# Patient Record
Sex: MALE | Race: WHITE | URBAN - METROPOLITAN AREA
[De-identification: names, ages, dates, MRNs, and addresses within clinical notes are randomized per-mention and may not be internally consistent; named-entity substitution may affect disease eponyms.]

---

## 2017-01-19 ENCOUNTER — TRANSFERRED RECORDS (OUTPATIENT)
Dept: HEALTH INFORMATION MANAGEMENT | Facility: CLINIC | Age: 67
End: 2017-01-19

## 2017-01-19 ENCOUNTER — MEDICAL CORRESPONDENCE (OUTPATIENT)
Dept: HEALTH INFORMATION MANAGEMENT | Facility: CLINIC | Age: 67
End: 2017-01-19

## 2017-01-23 ENCOUNTER — PRE VISIT (OUTPATIENT)
Dept: UROLOGY | Facility: CLINIC | Age: 67
End: 2017-01-23

## 2017-01-23 NOTE — TELEPHONE ENCOUNTER
Per [facility], pt had CT scan done elsewhere. Left m with call back # with pt to find out where. rzo

## 2017-02-08 ASSESSMENT — ENCOUNTER SYMPTOMS
DISTURBANCES IN COORDINATION: 0
HEADACHES: 0
TINGLING: 0
DIZZINESS: 0
SPEECH CHANGE: 0
FLANK PAIN: 0
MEMORY LOSS: 0
PARALYSIS: 0
TREMORS: 0
DIFFICULTY URINATING: 0
NUMBNESS: 1
HEMATURIA: 0
SEIZURES: 0
LOSS OF CONSCIOUSNESS: 0
DYSURIA: 0

## 2017-02-13 ENCOUNTER — PRE VISIT (OUTPATIENT)
Dept: UROLOGY | Facility: CLINIC | Age: 67
End: 2017-02-13

## 2017-02-17 ENCOUNTER — OFFICE VISIT (OUTPATIENT)
Dept: UROLOGY | Facility: CLINIC | Age: 67
End: 2017-02-17

## 2017-02-17 ENCOUNTER — ALLIED HEALTH/NURSE VISIT (OUTPATIENT)
Dept: UROLOGY | Facility: CLINIC | Age: 67
End: 2017-02-17

## 2017-02-17 VITALS
WEIGHT: 230.8 LBS | DIASTOLIC BLOOD PRESSURE: 83 MMHG | BODY MASS INDEX: 34.18 KG/M2 | SYSTOLIC BLOOD PRESSURE: 147 MMHG | HEART RATE: 65 BPM | HEIGHT: 69 IN

## 2017-02-17 DIAGNOSIS — R35.0 URINARY FREQUENCY: Primary | ICD-10-CM

## 2017-02-17 DIAGNOSIS — N28.89 LEFT RENAL MASS: Primary | ICD-10-CM

## 2017-02-17 DIAGNOSIS — R35.0 URINARY FREQUENCY: ICD-10-CM

## 2017-02-17 LAB
ABO + RH BLD: NORMAL
ABO + RH BLD: NORMAL
ALBUMIN SERPL-MCNC: 3.8 G/DL (ref 3.4–5)
ALP SERPL-CCNC: 83 U/L (ref 40–150)
ALT SERPL W P-5'-P-CCNC: 35 U/L (ref 0–70)
ANION GAP SERPL CALCULATED.3IONS-SCNC: 10 MMOL/L (ref 3–14)
AST SERPL W P-5'-P-CCNC: 28 U/L (ref 0–45)
BASOPHILS # BLD AUTO: 0 10E9/L (ref 0–0.2)
BASOPHILS NFR BLD AUTO: 0.2 %
BILIRUB SERPL-MCNC: 0.4 MG/DL (ref 0.2–1.3)
BLD GP AB SCN SERPL QL: NORMAL
BLOOD BANK CMNT PATIENT-IMP: NORMAL
BUN SERPL-MCNC: 26 MG/DL (ref 7–30)
CALCIUM SERPL-MCNC: 8.5 MG/DL (ref 8.5–10.1)
CHLORIDE SERPL-SCNC: 94 MMOL/L (ref 94–109)
CO2 SERPL-SCNC: 32 MMOL/L (ref 20–32)
CREAT SERPL-MCNC: 1.06 MG/DL (ref 0.66–1.25)
DIFFERENTIAL METHOD BLD: NORMAL
EOSINOPHIL # BLD AUTO: 0 10E9/L (ref 0–0.7)
EOSINOPHIL NFR BLD AUTO: 0.7 %
ERYTHROCYTE [DISTWIDTH] IN BLOOD BY AUTOMATED COUNT: 12.8 % (ref 10–15)
GFR SERPL CREATININE-BSD FRML MDRD: 69 ML/MIN/1.7M2
GLUCOSE SERPL-MCNC: 170 MG/DL (ref 70–99)
HCT VFR BLD AUTO: 45.8 % (ref 40–53)
HGB BLD-MCNC: 15.6 G/DL (ref 13.3–17.7)
IMM GRANULOCYTES # BLD: 0 10E9/L (ref 0–0.4)
IMM GRANULOCYTES NFR BLD: 0.2 %
INR PPP: 0.97 (ref 0.86–1.14)
LYMPHOCYTES # BLD AUTO: 1.7 10E9/L (ref 0.8–5.3)
LYMPHOCYTES NFR BLD AUTO: 38.6 %
MCH RBC QN AUTO: 29 PG (ref 26.5–33)
MCHC RBC AUTO-ENTMCNC: 34.1 G/DL (ref 31.5–36.5)
MCV RBC AUTO: 85 FL (ref 78–100)
MONOCYTES # BLD AUTO: 0.6 10E9/L (ref 0–1.3)
MONOCYTES NFR BLD AUTO: 13.5 %
NEUTROPHILS # BLD AUTO: 2 10E9/L (ref 1.6–8.3)
NEUTROPHILS NFR BLD AUTO: 46.8 %
NRBC # BLD AUTO: 0 10*3/UL
NRBC BLD AUTO-RTO: 0 /100
PLATELET # BLD AUTO: 186 10E9/L (ref 150–450)
POTASSIUM SERPL-SCNC: 3.2 MMOL/L (ref 3.4–5.3)
PROT SERPL-MCNC: 8.2 G/DL (ref 6.8–8.8)
RBC # BLD AUTO: 5.38 10E12/L (ref 4.4–5.9)
SODIUM SERPL-SCNC: 137 MMOL/L (ref 133–144)
SPECIMEN EXP DATE BLD: NORMAL
WBC # BLD AUTO: 4.3 10E9/L (ref 4–11)

## 2017-02-17 RX ORDER — HEPARIN SODIUM 5000 [USP'U]/.5ML
5000 INJECTION, SOLUTION INTRAVENOUS; SUBCUTANEOUS
Status: CANCELLED | OUTPATIENT
Start: 2017-02-17

## 2017-02-17 RX ORDER — CEFAZOLIN SODIUM 1 G/3ML
1 INJECTION, POWDER, FOR SOLUTION INTRAMUSCULAR; INTRAVENOUS SEE ADMIN INSTRUCTIONS
Status: CANCELLED | OUTPATIENT
Start: 2017-02-17

## 2017-02-17 RX ORDER — ACETAMINOPHEN 325 MG/1
975 TABLET ORAL ONCE
Status: CANCELLED | OUTPATIENT
Start: 2017-02-17 | End: 2017-02-17

## 2017-02-17 ASSESSMENT — PAIN SCALES - GENERAL: PAINLEVEL: NO PAIN (0)

## 2017-02-17 NOTE — NURSING NOTE
Pre Op Teaching Flowsheet       Pre and Post op Patient Education  Relevant Diagnosis:  Renal mass  Teaching Topic:  Pre and post op teaching  Person Involved in teaching: Larry Wegener    Motivation Level:  Asks Questions: Yes  Eager to Learn:  Yes  Cooperative: Yes  Receptive (willing/able to accept information):  Yes  Patient demonstrates understanding of the following:  Date and time of surgery:  TBD  Location of surgery:  TBD  History and Physical and any other testing necessary prior to surgery: Yes, Patient is going to have pre-op done in [facility] Pate in [facility]  Required time line for completion of History and Physical and any pre-op testing: Yes    NPO Guidelines: Nothing to eat 8hrs prior, Nothing to drink 2hrs prior    Patient demonstrates understanding of the following:  Pre-op bowel prep:  N/A  Pre-op showering/scrub information with PCMX Soap: Yes  Medications to take the day of surgery:  Per PCP  Blood thinner medications discussed and when to stop (if applicable):  Yes  Diabetes medication management (if applicable):  N/A  Discussed pain control after surgery: pain medications  Infection Prevention: Patient demonstrates understanding of the following:  Patient instructed on hand hygiene:  Yes  Surgical procedure site care taught: Yes  Signs and symptoms of infection taught:  Yes  Wound care will be taught at the time of discharge.  Central venous catheter care will be taught at the time of discharge (if applicable).    Post-op follow-up:  Discussed how to contact the hospital, nurse, and clinic scheduling staff if necessary.    Instructional materials used/given/mailed:  [place] [facility] Booklet, post op teaching sheet, Map, Soap, and arrival/location information.    Surgical instructions packet given to patient in office:  No, explain. Packet will be mailed when surgery is scheduled.

## 2017-02-17 NOTE — PROGRESS NOTES
ASSESSMENT AND PLAN    4.5 cm Left renal mass.  This is complicated by Diabetes Mellitus.    During counseling for this visit, we covered the risk of renal cell carcinoma and how this renal mass may be a non-cancerous lesion.  We covered options including observation, cryoablation, partial nephrectomy, and nephrectomy.  We covered different surgical approaches such as percutaneous, laparoscopic, robotic, and open surgery.  We covered the risk of observation which is metastatic spread and need for continued observation.  We covered surgical risks which include but are not limited to heart attack, stroke, blood clot in the legs or lungs, death, injury to surrounding organs (intestine, liver, spleen, pancreas, lung, muscles, nerves), hernias, loss of sensation around incisions, decreased renal function, and infection.  We discussed how blood transfusion may be necessary and the risks are viral illnesses such as HIV(AIDS) and Hepatitis.  Additional procedures may be necessary in the perioperative period.  If minimally invasive techniques are used it may be necessary to convert to open surgery, and if partial nephrectomy is attempted than full nephrectomy may be required.    We focused mainly on robot vs open partial nephrectomy.  I think open would be a better option given mass location and his body habitus.    T1 High Grade bladder cancer.  -He will follow-up with Dr Mclaughlin for this.  _______________________________________________________________________    CHIEF COMPLAINT  It was my pleasure to see Larry Wegener who is a 66 year old male for evaluation of renal mass    HPI   Mr. Lane is a very pleasant 58-year-old gentleman who is referred by Dr. Saida Mclaughlin for a left renal mass.  He is here today along with his wife.        His history began a few weeks ago when he presented to Dr. Mclaughlin with gross hematuria.  She evaluated him with a CT scan and cystoscopy.  He was found to have a bladder tumor and then a 4.5 cm  left renal mass.  Dr. Mclaughlin has since resected his bladder tumor and found T1 high grade bladder cancer.  She is planning a re-resection.  Of note, there was muscle in the initial specimen.        She is referring him here for the left renal mass.  He denies any family history or previous history of this with the exception of a nephew with renal cell carcinoma.       Presenting symptom: Hematuria  Hereditary syndromes: None.  Nephew with renal cell carcinoma.  ECOG Performance Score: 0  0=Fully active, 1=Unable to do strenuous activity but capable of light work, 2=Ambulatory and capable of all selfcare, 3=Capable of limited selfcare, confined to bed >50% of waking hours, 4=Completely disabled.    12/14/16 TRANSURETHRAL RESECTION OF BLADDER TUMOR Saida Mclaughlin MD        RADIOLOGIC IMAGING  12/13/16 CT Urogram [place]  Technique: CT of the abdomen and pelvis performed with coronal   reconstructions before and after IV contrast administration in the   pre-enhancement, nephrographic phase of enhancement, and excretory   phase of enhancement using either single or split bolus techniques.   This CT Scan used dose modulation, iterative reconstruction, and/or   weight based dosing when appropriate to reduce radiation dose to as   low as reasonably achievable.     Comparison: Ultrasound dated 12/12/2016     Findings:     Kidneys, Ureters, and Bladder: There is a focal mass arising from the   left kidney. This is a solid mass with inhomogeneous enhancement.   This measures approximately 4.5 x 3.9 x 3.3 cm. This is suspicious   for a possible hypernephroma or renal cell carcinoma versus   oncocytoma, or other solid renal mass. There is no hydronephrosis.     In addition there is a mass in the right posterior aspect of the   bladder. This measures approximately 3.6 x 3.0 cm in maximum   transverse dimension. This is suspicious for possible urothelial   neoplasm versus possible but less likely blood products.     Additional  Abdominal Findings: There is a large hemangioma left lobe   of the liver. There are multiple small gallstones or gravel in the   gallbladder. The pancreas, spleen, and adrenal glands are   unremarkable. No adenopathy, fluid collections, or inflammatory   changes. There are atelectatic changes in the left lung base.      Additional Pelvic Findings: No pelvic adenopathy, fluid collections,   or inflammatory changes. Bowel loops are normal caliber. There are   degenerative changes of the spine.      Impression:   1. 4.5 cm left renal mass. This could represent primary renal   neoplasm.  2. Focal mass arising from the posterior right wall of the bladder.   This could represent a primary urothelial neoplasm.  3. Cholelithiasis.  4. Left lobe of the liver hemangioma.                2/14/17 CHEST XRAY [place]  Technique: *XR CHEST AP or PA  LATERAL     Comparison: 2/9/2016.     Findings: Mild cardiomegaly. Normal pulmonary vascularity. Lungs   clear without acute consolidative airspace opacity, pleural effusion,   or pneumothorax. Mild subsegmental atelectasis left lower lobe. Prior   midline sternotomy. Broken sternal wire inferiorly projected over the   right hepatic dome anteriorly. Prior healed right third/fourth   lateral rib fractures. No significant change.    I reviewed the recent radiologic imaging and reports described above.      PMH      Allergies  NKDA      Social History     Occupational History     Not on file.     Social History Main Topics     Smoking status: Never Smoker     Smokeless tobacco: Never Used     Alcohol use Not on file     Drug use: Not on file     Sexual activity: Not on file       REVIEW OF SYSTEMS  Answers for HPI/ROS submitted by the patient on 2/8/2017   General Symptoms: No  Skin Symptoms: No  HENT Symptoms: No  EYE SYMPTOMS: No  HEART SYMPTOMS: No  LUNG SYMPTOMS: No  INTESTINAL SYMPTOMS: No  URINARY SYMPTOMS: Yes  REPRODUCTIVE SYMPTOMS: No  SKELETAL SYMPTOMS: No  BLOOD SYMPTOMS:  "No  NERVOUS SYSTEM SYMPTOMS: Yes  MENTAL HEALTH SYMPTOMS: No  Trouble holding urine or incontinence: Yes  Pain or burning: No  Trouble starting or stopping: No  Increased frequency of urination: Yes  Blood in urine: No  Decreased frequency of urination: No  Frequent nighttime urination: Yes  Flank pain: No  Difficulty emptying bladder: No  Trouble with coordination: No  Dizziness or trouble with balance: No  Fainting or black-out spells: No  Memory loss: No  Headache: No  Seizures: No  Speech problems: No  Tingling: No  Tremor: No  Paralysis: No  Numbness: Yes      PHYSICAL EXAM  Vitals:    02/17/17 1202   BP: 147/83   Pulse: 65   Weight: 104.7 kg (230 lb 12.8 oz)   Height: 1.753 m (5' 9\")     Wt Readings from Last 3 Encounters:   02/17/17 104.7 kg (230 lb 12.8 oz)     Constitutional: Alert, no acute distress  Psychiatric: Normal mood and affect  Head: Normocephalic. No masses, lesions, tenderness or abnormalities  Neck: Neck supple. No adenopathy. Thyroid symmetric, normal size  ENT: Oropharynx clear.  Back: No spinal tenderness.  No costovertebral angle tenderness  Cardiovascular: RRR. No murmurs, clicks gallops or rub  Respiratory: Good diaphragmatic excursion. Lungs clear  Gastrointestinal: Abdomen soft, non-tender. No masses, organomegaly. No hernia  Skin: no suspicious lesions or rashes on abdomen  Extremities: No lower extremity edema.  No clubbing or cyanosis.  Neurologic:  Cranial nerves grossly intact.  Equal strength and sensation on bilateral extremities.   : Deferred     Serafin MARIE, reviewed these laboratory values.      CC  Patient Care Team:  Joe Mitchell MD as MD (Urology)  Chito Powell MD (Urology)  CHITO POWELL    Copy to patient  LARRY WEGENER  [address] [place] [id number]    "

## 2017-02-17 NOTE — LETTER
2/17/2017       RE: Larry Wegener  [address]   [facility] [address]     Dear Colleague,    Thank you for referring your patient, Larry Wegener, to the [facility] UROLOGY AND INST FOR PROSTATE AND UROLOGIC CANCERS at [facility]. Please see a copy of my visit note below.    ASSESSMENT AND PLAN    4.5 cm Left renal mass.  This is complicated by Diabetes Mellitus.    During counseling for this visit, we covered the risk of renal cell carcinoma and how this renal mass may be a non-cancerous lesion.  We covered options including observation, cryoablation, partial nephrectomy, and nephrectomy.  We covered different surgical approaches such as percutaneous, laparoscopic, robotic, and open surgery.  We covered the risk of observation which is metastatic spread and need for continued observation.  We covered surgical risks which include but are not limited to heart attack, stroke, blood clot in the legs or lungs, death, injury to surrounding organs (intestine, liver, spleen, pancreas, lung, muscles, nerves), hernias, loss of sensation around incisions, decreased renal function, and infection.  We discussed how blood transfusion may be necessary and the risks are viral illnesses such as HIV(AIDS) and Hepatitis.  Additional procedures may be necessary in the perioperative period.  If minimally invasive techniques are used it may be necessary to convert to open surgery, and if partial nephrectomy is attempted than full nephrectomy may be required.    We focused mainly on robot vs open partial nephrectomy.  I think open would be a better option given mass location and his body habitus.    T1 High Grade bladder cancer.  -He will follow-up with Dr Mclaughlin for this.  _______________________________________________________________________    CHIEF COMPLAINT  It was my pleasure to see Larry Wegener who is a 66 year old male for evaluation of renal mass    HPI   Mr. Lane is a very pleasant 58-year-old  gentleman who is referred by Dr. Saida Mclaughlin for a left renal mass.  He is here today along with his wife.        His history began a few weeks ago when he presented to Dr. Mclaughlin with gross hematuria.  She evaluated him with a CT scan and cystoscopy.  He was found to have a bladder tumor and then a 4.5 cm left renal mass.  Dr. Mclaughlin has since resected his bladder tumor and found T1 high grade bladder cancer.  She is planning a re-resection.  Of note, there was muscle in the initial specimen.        She is referring him here for the left renal mass.  He denies any family history or previous history of this with the exception of a nephew with renal cell carcinoma.       Presenting symptom: Hematuria  Hereditary syndromes: None.  Nephew with renal cell carcinoma.  ECOG Performance Score: 0  0=Fully active, 1=Unable to do strenuous activity but capable of light work, 2=Ambulatory and capable of all selfcare, 3=Capable of limited selfcare, confined to bed >50% of waking hours, 4=Completely disabled.    12/14/16 TRANSURETHRAL RESECTION OF BLADDER TUMOR Saida Mclaughlin MD        RADIOLOGIC IMAGING  12/13/16 CT Urogram [place]  Technique: CT of the abdomen and pelvis performed with coronal   reconstructions before and after IV contrast administration in the   pre-enhancement, nephrographic phase of enhancement, and excretory   phase of enhancement using either single or split bolus techniques.   This CT Scan used dose modulation, iterative reconstruction, and/or   weight based dosing when appropriate to reduce radiation dose to as   low as reasonably achievable.     Comparison: Ultrasound dated 12/12/2016     Findings:     Kidneys, Ureters, and Bladder: There is a focal mass arising from the   left kidney. This is a solid mass with inhomogeneous enhancement.   This measures approximately 4.5 x 3.9 x 3.3 cm. This is suspicious   for a possible hypernephroma or renal cell carcinoma versus   oncocytoma, or other solid renal  mass. There is no hydronephrosis.     In addition there is a mass in the right posterior aspect of the   bladder. This measures approximately 3.6 x 3.0 cm in maximum   transverse dimension. This is suspicious for possible urothelial   neoplasm versus possible but less likely blood products.     Additional Abdominal Findings: There is a large hemangioma left lobe   of the liver. There are multiple small gallstones or gravel in the   gallbladder. The pancreas, spleen, and adrenal glands are   unremarkable. No adenopathy, fluid collections, or inflammatory   changes. There are atelectatic changes in the left lung base.      Additional Pelvic Findings: No pelvic adenopathy, fluid collections,   or inflammatory changes. Bowel loops are normal caliber. There are   degenerative changes of the spine.      Impression:   1. 4.5 cm left renal mass. This could represent primary renal   neoplasm.  2. Focal mass arising from the posterior right wall of the bladder.   This could represent a primary urothelial neoplasm.  3. Cholelithiasis.  4. Left lobe of the liver hemangioma.                2/14/17 CHEST XRAY [place]  Technique: *XR CHEST AP or PA  LATERAL     Comparison: 2/9/2016.     Findings: Mild cardiomegaly. Normal pulmonary vascularity. Lungs   clear without acute consolidative airspace opacity, pleural effusion,   or pneumothorax. Mild subsegmental atelectasis left lower lobe. Prior   midline sternotomy. Broken sternal wire inferiorly projected over the   right hepatic dome anteriorly. Prior healed right third/fourth   lateral rib fractures. No significant change.    I reviewed the recent radiologic imaging and reports described above.      PMH      Allergies  NKDA      Social History     Occupational History     Not on file.     Social History Main Topics     Smoking status: Never Smoker     Smokeless tobacco: Never Used     Alcohol use Not on file     Drug use: Not on file     Sexual activity: Not on file  "      PHYSICAL EXAM  Vitals:    02/17/17 1202   BP: 147/83   Pulse: 65   Weight: 104.7 kg (230 lb 12.8 oz)   Height: 1.753 m (5' 9\")     Wt Readings from Last 3 Encounters:   02/17/17 104.7 kg (230 lb 12.8 oz)     Constitutional: Alert, no acute distress  Psychiatric: Normal mood and affect  Head: Normocephalic. No masses, lesions, tenderness or abnormalities  Neck: Neck supple. No adenopathy. Thyroid symmetric, normal size  ENT: Oropharynx clear.  Back: No spinal tenderness.  No costovertebral angle tenderness  Cardiovascular: RRR. No murmurs, clicks gallops or rub  Respiratory: Good diaphragmatic excursion. Lungs clear  Gastrointestinal: Abdomen soft, non-tender. No masses, organomegaly. No hernia  Skin: no suspicious lesions or rashes on abdomen  Extremities: No lower extremity edema.  No clubbing or cyanosis.  Neurologic:  Cranial nerves grossly intact.  Equal strength and sensation on bilateral extremities.   : Deferred     ISerafin, reviewed these laboratory values.    CC  Patient Care Team:  Joe Mtichell MD as MD (Urology)  Chito Powell MD (Urology)  CHITO POWELL    Copy to patient  LARRY WEGENER  [address]   [facility] [id number]      "

## 2017-02-17 NOTE — MR AVS SNAPSHOT
After Visit Summary   2/17/2017    Larry Wegener    MRN: [id number]           Patient Information     Date Of Birth          1950        Visit Information        Provider Department      2/17/2017 11:40 AM Jeo Mitchell MD [facility] Urology and [facility] for Prostate and Urologic Cancers        Today's Diagnoses     Left renal mass    -  1      Care Instructions    Schedule surgery with Dr. Mitchell.    It was a pleasure meeting with you today.  Thank you for allowing me and my team the privilege of caring for you today.  YOU are the reason we are here, and I truly hope we provided you with the excellent service you deserve.  Please let us know if there is anything else we can do for you so that we can be sure you are leaving completely satisfied with your care experience.      Lani Eddy [facility]        Follow-ups after your visit        Additional Services     PAC Visit Referral (For [facility] Only)       Obesity and long surgery                  Your next 10 appointments already scheduled     Mar 22, 2017  1:00 PM CDT   (Arrive by 12:45 PM)   PAC RN ASSESSMENT with  Pac Rn   [facility] Preoperative Assessment Red Level ([facility] Surgery Red Level)    [address] [place] [id number] [phone]            Mar 22, 2017  1:30 PM CDT   (Arrive by 1:15 PM)   PAC EVALUATION with  Pac Kim 7   [facility] Preoperative Assessment [place] ([facility] Surgery Red Level)    [address] [place] [id number] [phone]            Mar 22, 2017  2:30 PM CDT   (Arrive by 2:15 PM)   PAC Anesthesia Consult with  Pac Anesthesiologist   [facility] Preoperative Assessment Red Level ([facility] Surgery Red Level)    [address] [place] [id number]   [phone]            Apr 05, 2017   Procedure with Joe Mitchell MD   [facility], [place], Same Day Surgery (--)    [address] [facility] [address]   [phone]  "           Apr 21, 2017  1:00 PM CDT   (Arrive by 12:45 PM)   Post-Op with Joe Mitchell MD   [facility] Urology and [facility] for Prostate and Urologic Cancers ([facility])    [address] [place] [id number] [phone]              Who to contact     Please call your clinic at [phone] to:    Ask questions about your health    Make or cancel appointments    Discuss your medicines    Learn about your test results    Speak to your doctor   If you have compliments or concerns about an experience at your clinic, or if you wish to file a complaint, please contact [facility] Patient Relations at [phone] or email us at [email or web address]         Additional Information About Your Visit        [email or web address] Information     [employer] gives you secure access to your electronic health record. If you see a primary care provider, you can also send messages to your care team and make appointments. If you have questions, please call your primary care clinic.  If you do not have a primary care provider, please call [phone] and they will assist you.      [employer] is an electronic gateway that provides easy, online access to your medical records. With [employer], you can request a clinic appointment, read your test results, renew a prescription or communicate with your care team.     To access your existing account, please contact your [facility] or call [phone] for assistance.        Care EveryWhere ID     This is your Care EveryWhere ID. This could be used by other organizations to access your [place] medical records  [id number]        Your Vitals Were     Pulse Height BMI (Body Mass Index)             65 1.753 m (5' 9\") 34.08 kg/m2          Blood Pressure from Last 3 Encounters:   02/17/17 147/83    Weight from Last 3 Encounters:   02/17/17 104.7 kg (230 lb 12.8 oz)              We Performed the " Following     ABO/Rh type and screen     CBC with platelets differential     Comprehensive metabolic panel     INR     PAC Visit Referral (For [facility] Only)     Becky-Operative Worksheet  (Urology General)        Primary Care Provider    None Specified       No address on file        Thank you!     Thank you for choosing [facility] UROLOGY AND [facility] FOR PROSTATE AND UROLOGIC CANCERS  for your care. Our goal is always to provide you with excellent care. Hearing back from our patients is one way we can continue to improve our services. Please take a few minutes to complete the written survey that you may receive in the mail after your visit with us. Thank you!             Your Updated Medication List - Protect others around you: Learn how to safely use, store and throw away your medicines at www.disposemymeds.org.      Notice  As of 2/17/2017 11:59 PM    You have not been prescribed any medications.

## 2017-02-17 NOTE — MR AVS SNAPSHOT
After Visit Summary   2/17/2017    Larry Wegener    MRN: [id number]           Patient Information     Date Of Birth          1950        Visit Information        Provider Department      2/17/2017 12:30 PM Nurse, [facility]        Today's Diagnoses     Left renal mass    -  1       Follow-ups after your visit        Who to contact     Please call your clinic at [phone] to:    Ask questions about your health    Make or cancel appointments    Discuss your medicines    Learn about your test results    Speak to your doctor   If you have compliments or concerns about an experience at your clinic, or if you wish to file a complaint, please contact [facility] Patient Relations at [phone] or email us at [email or web address]         Additional Information About Your Visit        MyChart Information     [employer] gives you secure access to your electronic health record. If you see a primary care provider, you can also send messages to your care team and make appointments. If you have questions, please call your primary care clinic.  If you do not have a primary care provider, please call [phone] and they will assist you.      [employer] is an electronic gateway that provides easy, online access to your medical records. With [employer], you can request a clinic appointment, read your test results, renew a prescription or communicate with your care team.     To access your existing account, please contact your [facility] or call [phone] for assistance.        Care EveryWhere ID     This is your Care EveryWhere ID. This could be used by other organizations to access your [place] medical records  [id number]         Blood Pressure from Last 3 Encounters:   02/17/17 147/83    Weight from Last 3 Encounters:   02/17/17 104.7 kg (230 lb 12.8 oz)               Today, you had the following     No orders found for display       Primary Care Provider    None Specified       No primary provider on file.        Thank you!     Thank you for choosing [facility]  for your care. Our goal is always to provide you with excellent care. Hearing back from our patients is one way we can continue to improve our services. Please take a few minutes to complete the written survey that you may receive in the mail after your visit with us. Thank you!             Your Updated Medication List - Protect others around you: Learn how to safely use, store and throw away your medicines at www.disposemymeds.org.      Notice  As of 2/17/2017  1:21 PM    You have not been prescribed any medications.

## 2017-02-17 NOTE — PATIENT INSTRUCTIONS
Schedule surgery with Dr. Mitchell.    It was a pleasure meeting with you today.  Thank you for allowing me and my team the privilege of caring for you today.  YOU are the reason we are here, and I truly hope we provided you with the excellent service you deserve.  Please let us know if there is anything else we can do for you so that we can be sure you are leaving completely satisfied with your care experience.      DIPIKA Avilez

## 2017-02-18 LAB
BACTERIA SPEC CULT: NO GROWTH
Lab: NORMAL
MICRO REPORT STATUS: NORMAL
SPECIMEN SOURCE: NORMAL

## 2017-03-22 ENCOUNTER — OFFICE VISIT (OUTPATIENT)
Dept: SURGERY | Facility: CLINIC | Age: 67
End: 2017-03-22

## 2017-03-22 ENCOUNTER — ANESTHESIA EVENT (OUTPATIENT)
Dept: SURGERY | Facility: CLINIC | Age: 67
DRG: 657 | End: 2017-03-22
Payer: MEDICARE

## 2017-03-22 ENCOUNTER — ALLIED HEALTH/NURSE VISIT (OUTPATIENT)
Dept: SURGERY | Facility: CLINIC | Age: 67
End: 2017-03-22

## 2017-03-22 VITALS
OXYGEN SATURATION: 94 % | BODY MASS INDEX: 37.65 KG/M2 | HEART RATE: 69 BPM | RESPIRATION RATE: 18 BRPM | DIASTOLIC BLOOD PRESSURE: 73 MMHG | HEIGHT: 67 IN | WEIGHT: 239.9 LBS | SYSTOLIC BLOOD PRESSURE: 136 MMHG

## 2017-03-22 DIAGNOSIS — Z01.818 PREOP EXAMINATION: Primary | ICD-10-CM

## 2017-03-22 DIAGNOSIS — Z01.810 ENCOUNTER FOR PREPROCEDURAL CARDIOVASCULAR EXAMINATION: ICD-10-CM

## 2017-03-22 DIAGNOSIS — Z01.818 PREOP GENERAL PHYSICAL EXAM: Primary | ICD-10-CM

## 2017-03-22 DIAGNOSIS — Z01.818 PREOP GENERAL PHYSICAL EXAM: ICD-10-CM

## 2017-03-22 LAB
ALBUMIN UR-MCNC: NEGATIVE MG/DL
ANION GAP SERPL CALCULATED.3IONS-SCNC: 7 MMOL/L (ref 3–14)
APPEARANCE UR: CLEAR
BILIRUB UR QL STRIP: NEGATIVE
BUN SERPL-MCNC: 21 MG/DL (ref 7–30)
CALCIUM SERPL-MCNC: 8.3 MG/DL (ref 8.5–10.1)
CHLORIDE SERPL-SCNC: 100 MMOL/L (ref 94–109)
CO2 SERPL-SCNC: 32 MMOL/L (ref 20–32)
COLOR UR AUTO: YELLOW
CREAT SERPL-MCNC: 1.04 MG/DL (ref 0.66–1.25)
ERYTHROCYTE [DISTWIDTH] IN BLOOD BY AUTOMATED COUNT: 13.2 % (ref 10–15)
GFR SERPL CREATININE-BSD FRML MDRD: 71 ML/MIN/1.7M2
GLUCOSE SERPL-MCNC: 173 MG/DL (ref 70–99)
GLUCOSE UR STRIP-MCNC: NEGATIVE MG/DL
HCT VFR BLD AUTO: 39.4 % (ref 40–53)
HGB BLD-MCNC: 13.5 G/DL (ref 13.3–17.7)
HGB UR QL STRIP: NEGATIVE
INR PPP: 0.99 (ref 0.86–1.14)
KETONES UR STRIP-MCNC: NEGATIVE MG/DL
LEUKOCYTE ESTERASE UR QL STRIP: NEGATIVE
MCH RBC QN AUTO: 29.3 PG (ref 26.5–33)
MCHC RBC AUTO-ENTMCNC: 34.3 G/DL (ref 31.5–36.5)
MCV RBC AUTO: 86 FL (ref 78–100)
NITRATE UR QL: NEGATIVE
PH UR STRIP: 5 PH (ref 5–7)
PLATELET # BLD AUTO: 209 10E9/L (ref 150–450)
POTASSIUM SERPL-SCNC: 4.2 MMOL/L (ref 3.4–5.3)
RBC # BLD AUTO: 4.6 10E12/L (ref 4.4–5.9)
SODIUM SERPL-SCNC: 139 MMOL/L (ref 133–144)
SP GR UR STRIP: 1.02 (ref 1–1.03)
URN SPEC COLLECT METH UR: NORMAL
UROBILINOGEN UR STRIP-MCNC: 0 MG/DL (ref 0–2)
WBC # BLD AUTO: 7.9 10E9/L (ref 4–11)

## 2017-03-22 RX ORDER — OXYMETAZOLINE HYDROCHLORIDE 0.05 G/100ML
2 SPRAY NASAL 2 TIMES DAILY PRN
COMMUNITY
End: 2017-04-21

## 2017-03-22 RX ORDER — ATENOLOL 50 MG/1
50 TABLET ORAL EVERY MORNING
COMMUNITY

## 2017-03-22 RX ORDER — ISOSORBIDE MONONITRATE 60 MG/1
60 TABLET, EXTENDED RELEASE ORAL EVERY MORNING
COMMUNITY

## 2017-03-22 RX ORDER — CEPHALEXIN 500 MG/1
CAPSULE ORAL
COMMUNITY
Start: 2016-12-14 | End: 2017-03-22

## 2017-03-22 RX ORDER — ELECTROLYTES/DEXTROSE
1 SOLUTION, ORAL ORAL EVERY EVENING
COMMUNITY

## 2017-03-22 RX ORDER — SIMVASTATIN 40 MG
40 TABLET ORAL AT BEDTIME
COMMUNITY

## 2017-03-22 RX ORDER — CHLORTHALIDONE 25 MG/1
25 TABLET ORAL EVERY MORNING
COMMUNITY

## 2017-03-22 RX ORDER — HYDROCODONE BITARTRATE AND ACETAMINOPHEN 5; 325 MG/1; MG/1
TABLET ORAL
COMMUNITY
Start: 2016-12-14 | End: 2017-03-22

## 2017-03-22 RX ORDER — PLANT STANOL ESTER 450 MG
2.5 TABLET ORAL EVERY MORNING
COMMUNITY

## 2017-03-22 RX ORDER — ASPIRIN 325 MG
TABLET ORAL
COMMUNITY

## 2017-03-22 RX ORDER — LOSARTAN POTASSIUM 25 MG/1
25 TABLET ORAL EVERY MORNING
COMMUNITY

## 2017-03-22 RX ORDER — OXYBUTYNIN CHLORIDE 5 MG/1
TABLET ORAL
COMMUNITY
Start: 2016-12-15 | End: 2017-03-22

## 2017-03-22 RX ORDER — PHENAZOPYRIDINE HYDROCHLORIDE 200 MG/1
TABLET, FILM COATED ORAL
COMMUNITY
End: 2017-03-22

## 2017-03-22 RX ORDER — FAMOTIDINE 40 MG/1
40 TABLET, FILM COATED ORAL 2 TIMES DAILY
COMMUNITY

## 2017-03-22 NOTE — H&P
Pre-Operative H & P     CC:  Preoperative exam to assess for increased cardiopulmonary risk while undergoing surgery and anesthesia.    Date of Encounter: 3/22/2017  Primary Care Physician:  Unknown, Provider    HPI  Larry Wegener is a 67 year old male who presents for pre-operative H & P in preparation for Left Open Partial Nephrectomy Flank Incision Possible Nephrectomy with Dr. Mitchell on 4/5/2017 at [facility].     History is obtained from the patient.   Pt presented with gross hematuria. He was found to have a bladder tumor and then a 4.5 cm left renal mass. Dr. Mclaughlin has since resected his bladder tumor and found T1 high grade bladder cancer. She is planning a re-resection. Of note, there was muscle in the initial specimen.         Past Medical History  Past Medical History:   Diagnosis Date     CKD (chronic kidney disease)      DM II (diabetes mellitus, type II), controlled (H)      GERD (gastroesophageal reflux disease)      HTN (hypertension)      Left renal mass      Neuropathy (H)        Past Surgical History  Past Surgical History:   Procedure Laterality Date     ANGIOGRAM  1997    stent     CORONARY ARTERY BYPASS  2004     CYSTOSCOPY, BIOPSY BLADDER INSTILL OPTICAL AGENT       TONSILLECTOMY         Hx of Blood transfusions/reactions: yes, 2012, no reactions     Hx of abnormal bleeding or anti-platelet use: asa and fish oil, will hold    Menstrual history: No LMP for male patient.:     Steroid use in the last year: none    Personal or FH with difficulty with Anesthesia:  None        Prior to Admission Medications  Current Outpatient Prescriptions   Medication Sig Dispense Refill     aspirin 325 MG tablet March 22, 2017 - Patient has been holding this medication in preparation for surgery.  Not taken since December 2016.  Plans to resume after surgery in April, will continue to hold for now.  Previously was on 325 mg by mouth daily.       atenolol (TENORMIN)  50 MG tablet Take 50 mg by mouth every morning        chlorthalidone (HYGROTON) 25 MG tablet Take 25 mg by mouth every morning        famotidine (PEPCID) 40 MG tablet Take 40 mg by mouth 2 times daily        insulin aspart (NOVOLOG PEN) 100 UNIT/ML injection Inject Subcutaneous 4 times daily (with meals and nightly) Takes base of 12 units +SSI at breakfast   Takes base of 12 units +SSI at lunch  Takes base of 14 units +SSI at supper  Takes base of 12 units +SSI at bedtime       insulin glargine (LANTUS) 100 UNIT/ML injection Inject 45 Units Subcutaneous At Bedtime        isosorbide mononitrate (IMDUR) 60 MG 24 hr tablet Take 60 mg by mouth every morning        losartan (COZAAR) 25 MG tablet Take 25 mg by mouth every morning        metFORMIN (GLUCOPHAGE) 500 MG tablet Take 1,000 mg by mouth 2 times daily (with meals)        potassium gluconate 2.5 MEQ TABS tablet Take 2.5 mEq by mouth every morning        simvastatin (ZOCOR) 40 MG tablet Take 40 mg by mouth At Bedtime        Multiple Vitamins-Minerals (MULTIVITAMIN ADULT) TABS Take 1 tablet by mouth every evening        Omega-3 Fatty Acids (FISH OIL PO) March 22, 2017 - Patient has been holding this medication in preparation for surgery.  Not taken since December 2016.  Plans to resume after surgery in April, will continue to hold for now.  Previously was on fish oil 1000 mg by mouth twice daily.       NITROGLYCERIN SL Place 0.4 mg under the tongue every 5 minutes as needed for chest pain       oxymetazoline (AFRIN) 0.05 % spray Spray 2 sprays into both nostrils 2 times daily as needed for congestion       ACETAMINOPHEN PO Take 500-1,000 mg by mouth every 8 hours as needed for pain         Allergies  No Known Allergies    Social History  Social History     Social History     Marital status:      Spouse name: N/A     Number of children: N/A     Years of education: N/A     Occupational History     Not on file.     Social History Main Topics     Smoking status:  "Never Smoker     Smokeless tobacco: Never Used     Alcohol use No     Drug use: No     Sexual activity: Not on file     Other Topics Concern     Not on file     Social History Narrative       Family History  No family history on file.              Anesthesia Evaluation     . Pt has had prior anesthetic. Type: General and MAC           ROS/MED HX    ENT/Pulmonary:     (+)VICTOR M risk factors snores loudly, hypertension, obese, , . .    Neurologic:  - neg neurologic ROS     Cardiovascular:     (+) hypertension----. Taking blood thinners : Instructions Given to patient: asa, on hold. . . :. . Previous cardiac testing Echodate:7/10/2015results:Stress Testdate: results:ECG reviewed date:3/22/2017 results:SR, 1st degree AVB, RBBB date: results:          METS/Exercise Tolerance:  4 - Raking leaves, gardening   Hematologic:  - neg hematologic  ROS       Musculoskeletal:  - neg musculoskeletal ROS       GI/Hepatic:     (+) GERD Asymptomatic on medication,       Renal/Genitourinary:     (+) Other Renal/ Genitourinary, left renal mass, bladder cancer      Endo:     (+) type II DM Last HgA1c: 7.5 date: 8/2016 Using insulin - not using insulin pump Obesity, .      Psychiatric:  - neg psychiatric ROS       Infectious Disease:  - neg infectious disease ROS       Malignancy:   (+) Malignancy History of Other  Other CA renal, bladder Active status post         Other:    (+) No chance of pregnancy C-spine cleared: N/A, no H/O Chronic Pain,no other significant disability                    Physical Exam  Normal systems: cardiovascular, pulmonary and dental    Airway   Mallampati: II  TM distance: >3 FB  Neck ROM: full    Dental   Normal    Cardiovascular   Rhythm and rate: regular and normal      Pulmonary    breath sounds clear to auscultation             The complete review of systems is negative other than noted in the HPI or here.                         239 lbs 14.4 oz  5' 7\"   Body mass index is 37.57 kg/(m^2).       Physical " Exam  Constitutional: Awake, alert, cooperative, no apparent distress, and appears stated age.  Eyes: Pupils equal, round and reactive to light, extra ocular muscles intact, sclera clear, conjunctiva normal.  HENT: Normocephalic, oral pharynx with moist mucus membranes, good dentition. No goiter appreciated.   Respiratory: Clear to auscultation bilaterally, no crackles or wheezing.  Cardiovascular: Regular rate and rhythm, normal S1 and S2, and no murmur noted.  Carotids +2, no bruits. No edema. Palpable pulses to radial  DP and PT arteries.   GI: Normal bowel sounds, soft, non-distended, non-tender, no masses palpated, no hepatosplenomegaly.  Surgical scars: chest  Lymph/Hematologic: No cervical lymphadenopathy and no supraclavicular lymphadenopathy.  Genitourinary:  deferred  Skin: Warm and dry.  No rashes at anticipated surgical site.   Musculoskeletal: Full ROM of neck. There is no redness, warmth, or swelling of the joints. Gross motor strength is normal.    Neurologic: Awake, alert, oriented to name, place and time. Cranial nerves II-XII are grossly intact. Gait is normal.   Neuropsychiatric: Calm, cooperative. Normal affect.     Labs: (personally reviewed)  Lab Results   Component Value Date    WBC 7.9 03/22/2017     Lab Results   Component Value Date    RBC 4.60 03/22/2017     Lab Results   Component Value Date    HGB 13.5 03/22/2017     Lab Results   Component Value Date    HCT 39.4 03/22/2017     No components found for: MCT  Lab Results   Component Value Date    MCV 86 03/22/2017     Lab Results   Component Value Date    MCH 29.3 03/22/2017     Lab Results   Component Value Date    MCHC 34.3 03/22/2017     Lab Results   Component Value Date    RDW 13.2 03/22/2017     Lab Results   Component Value Date     03/22/2017       Last Basic Metabolic Panel:  Lab Results   Component Value Date     03/22/2017      Lab Results   Component Value Date    POTASSIUM 4.2 03/22/2017     Lab Results    Component Value Date    CHLORIDE 100 03/22/2017     Lab Results   Component Value Date    FORREST 8.3 03/22/2017     Lab Results   Component Value Date    CO2 32 03/22/2017     Lab Results   Component Value Date    BUN 21 03/22/2017     Lab Results   Component Value Date    CR 1.04 03/22/2017     Lab Results   Component Value Date     03/22/2017       INR     0.99     3/22/2017    UA RESULTS:  Recent Labs   Lab Test  03/22/17   1521   COLOR  Yellow   APPEARANCE  Clear   URINEGLC  Negative   URINEBILI  Negative   URINEKETONE  Negative   SG  1.016   UBLD  Negative   URINEPH  5.0   PROTEIN  Negative   NITRITE  Negative   LEUKEST  Negative           EKG: Personally reviewed but formal cardiology read pending: 3/22/2017 SR with 1st degree AVB and RBBB. Age undetermined inferior MI. Will obtain stress test.    Stress 4/4/2017    Interpretation Summary  Technically difficult study limited by baseline wall motion abnormalities and  difficult acoustic windows.  High risk exercise stress echocardiogram.  Inferior akinesis at rest and peak exercise with associated mid-anterolateral  hypokinesis at peak exercise.  Mild to moderately reduced LV systolic function at rest. The LVEF is 40-45%  with minimal increase to 45-50% with exercise.  Normal blood pressure response to exercise.  EKG demonstrates baseline RBBB and Q waves inferior and inferolaterally. No  EKG evidence of ischemia at peak exercise.  No subjective evidence of ischemia at rest and at peak exercise.  No significant valvular disease noted on routine screening color flow Doppler  and pulsed Doppler examination.  In aggregate, these findings are c/w extensive prior myocardial infarction in  the inferior and lateral myocardial wall with perinfarct inducible ischemia in  the lateral myocardial segment.      Echo 7/10/2015  RESULTS  LEFT ATRIUM:    Left atrial size is enlarged at 4.1.    MITRAL VALVE:    Mild MR.    LEFT VENTRICLE:    Left ventricular size is  normal. Ejection fraction is approximately 45-50% with inferior and inferolateral hypokinesis.     AORTIC VALVE:    No AS. No aortic insufficiency.     AORTA:    Normal.    RIGHT ATRIUM:    Normal size.    TRICUSPID VALVE:    Mild TR.    RIGHT VENTRICLE:    Normal size.     PULMONIC VALVE:    Mild pulmonic insufficiency.     PERICARDIUM:    No pericardial effusion.    ADDITIONAL COMMENTS:    The interatrial septum is intact.     CONCLUSION:  1. Normal LV size.  Ejection fraction is approximately 45-50%. Inferolateral and inferior hypokinesis. Diastolic function is stage I.    2. No AS. No aortic insufficiency.     3. Mild MR.    4. Mild pulmonic insufficiency.     5. Mild TR.     6. No pericardial effusion.     7. Left atrial enlargement.     8. No previous echocardiogram for comparison.         ASSESSMENT and PLAN  Larry Wegener is a 67 year old male scheduled to undergo Left Open Partial Nephrectomy Flank Incision Possible Nephrectomy. He has the following specific operative considerations:   - RCRI : High serious cardiac risks.  6.6% risk of major adverse cardiac event. This is due to ischemic heart disease and DM requiring insulin. Will get stress test.  - Anesthesia considerations:  Refer to PAC assessment in anesthesia records  - VTE risk: 3% due to age, gender and current cancer  - VICTOR M # of risks 6/8 = 75%  - Post-op delirium risk: high risk due to age  - Risk of PONV score = 2.  If > 2, anti-emetic intervention recommended.       Previous anesthesia without complications.  Cardiac: HTN. PCI 1997, 3 vessel CABG 2004. Echo 1/10/2015, results above. EKG today SR with 1st degree AVB and RBBB. Denies cardiac symtoms.  Pulmonary:  No current pulmonary symptoms. Risk of VICTOR M  GI: GERD, daily Pepcid.  Renal: presented with gross hematuria. He was found to have a bladder tumor and then a 4.5 cm left renal mass. Dr. Mclaughlin has since resected his bladder tumor and found T1 high grade bladder cancer. She is planning a  re-resection. Of note, there was muscle in the initial specimen.     Can walk 6-7 blocks without stopping. Feasible airway      Patient was discussed with Dr Gonzalez.    RAFAEL Mabry CNS  Preoperative Assessment Center  [facility]  Clinic and Surgery Center  Phone: [phone]  Fax: [phone]

## 2017-03-22 NOTE — PATIENT INSTRUCTIONS
Preparing for Your Surgery  Name:  Larry Wegener   MRN:  [id number]   :  1950   Today's Date:  3/22/2017     Arriving for surgery:  Surgery date:  17  Surgery time:  7:45 AM  Arrival time:  5:45 AM    Please come to:     [facility], [address]  [address] [place]  [address]    -   parking is available in front of the hospital from 5:15 am to 8:00 pm    -  Stop at the Information Desk in the lobby    -   Inform the information person that you are here for surgery. An escort to 3c will be provided. If you would not like an escort, please proceed to 3C on the 3rd floor. [phone]     What can I eat or drink?  -  You may have solid food or milk products until 8 hours prior to your surgery.  -  You may have clear liquids such as: water, gatorade, tea, black coffee, jello, broth, apple juice or 7up/Sprite until 2 hours prior to your surgery.    Which medicines can I take?        -  Please hold vitamins for 7 days before surgery.        -  Please take 36 units Lantus Insulin the evening before surgery.    -  Do NOT take these medications in the morning, the day of surgery:  CHLORTHALIDONE,  ASPART INSULIN,  LOSARTAN,  METFORMIN.    -  Please take these medications the day of surgery:  ALL OTHER REGULARLY SCHEDULED MORNING MEDICATIONS.    How do I prepare myself?  -  Take two showers: one the night before surgery; and one the morning of surgery.         Use Scrubcare or Hibiclens to wash from neck down.  You may use your own shampoo and conditioner. No other hair products.   -  Do NOT use lotion, powder, deodorant, or antiperspirant the day of your surgery.  -  Do NOT wear any makeup, fingernail polish or jewelry.  -  Begin using Incentive Spirometer 1 week prior to surgery.  Use 4 times per day, up to 5-10 breaths each time.  Bring Incentive Spirometer to hospital.  -  Do not bring your own medications to the hospital, except for inhalers and eye drops.  -   Bring your ID and insurance card.    Questions or Concerns:  If you have questions or concerns, please call the  Preoperative Assessment Center, Monday-Friday 7AM-7PM:  [phone]    AFTER YOUR SURGERY  Breathing exercises   Breathing exercises help you recover faster. Take deep breaths and let the air out slowly. This will:     Help you wake up after surgery.    Help prevent complications like pneumonia.  Preventing complications will help you go home sooner.   We may give you a breathing device (incentive spirometer) to encourage you to breathe deeply.     Using an Incentive Spirometer  Soon after your surgery, a nurse or therapist will teach you breathing exercises. These keep your lungs clear, strengthen your breathing muscles, and help prevent complications.  The exercises include doing a deep-breathing exercise using a device called an incentive spirometer.  To do these exercises, you will breathe in through your mouth and not your nose. The incentive spirometer only works correctly if you breathe in through your mouth.  Four steps to clear lungs     Deep breathing expands the lungs, aids circulation, and helps prevent pneumonia.   1. Exhale normally.    Relax and breathe out.  2. Place your lips tightly around the mouthpiece.    Make sure the device is upright and not tilted.  3. Inhale as much air as you can through the mouthpiece (don't breath through your nose).    Inhale slowly and deeply.    Hold your breath long enough to keep the balls or disk raised for at least 3 seconds.    If you re inhaling too quickly, your device may make a tone. If you hear this tone, inhale more slowly.  4. Repeat the exercise regularly.    Do this exercise every hour while you're awake, or as your health care provider instructs.    You will also be taught coughing exercises and be asked to do them regularly on your own.    7093-8659 [employer]. [address] [place] [address], [place] [address]. All rights reserved.  This information is not intended as a substitute for professional medical care. Always follow your healthcare professional's instructions.        Nausea and vomiting   You may feel sick to your stomach after surgery; if so, let your nurse know.    Pain control:  After surgery, you may have pain. Our goal is to help you manage your pain. Pain medicine will help you feel comfortable enough to do activities that will help you heal.  These activities may include breathing exercises, walking and physical therapy.   To help your health care team treat your pain we will ask: 1) If you have pain  2) where it is located 3) describe your pain in your words  Methods of pain control include medications given by mouth, vein or by nerve block for some surgeries.  We may give you a pain control pump that will:  1) Deliver the medicine through a tube placed in your vein  2) Control the amount of medicine you receive  3) Allow you to push a button to deliver a dose of pain medicine  Sequential Compression Device (SCD) or Pneumo Boots:  You may need to wear SCD S on your legs or feet. These are wraps connected to a machine that pumps in air and releases it. The repeated pumping helps prevent blood clots from forming.

## 2017-03-22 NOTE — H&P
"  Pre-Operative H & P     CC:  Preoperative exam to assess for increased cardiopulmonary risk while undergoing surgery and anesthesia.    Date of Encounter: 3/22/2017  Primary Care Physician:  Unknown, Provider    HPI  Larry Wegener is a 67 year old male who presents for pre-operative H & P in preparation for *** with  *** on { :[id number]} [id number]\"[facility]\",[facility]\"[facility]\"}. ***    {   History obtained from:[id number]::\"History is obtained from the patient\"}.     Past Medical History  Past Medical History:   Diagnosis Date     CKD (chronic kidney disease)      DM II (diabetes mellitus, type II), controlled (H)      GERD (gastroesophageal reflux disease)      HTN (hypertension)      Left renal mass      Neuropathy (H)        Past Surgical History  Past Surgical History:   Procedure Laterality Date     CORONARY ARTERY BYPASS         Hx of Blood transfusions/reactions: ***     Hx of abnormal bleeding or anti-platelet use: ***    Menstrual history: No LMP for male patient.: ***    Steroid use in the last year: ***    Personal or FH with difficulty with Anesthesia:  ***    Prior to Admission Medications  Current Outpatient Prescriptions   Medication Sig Dispense Refill     aspirin 325 MG tablet Take 325 mg by mouth daily        atenolol (TENORMIN) 50 MG tablet Take 50 mg by mouth every morning        chlorthalidone (HYGROTON) 25 MG tablet Take 50 mg by mouth every morning        famotidine (PEPCID) 40 MG tablet Take 40 mg by mouth 2 times daily        insulin aspart (NOVOLOG PEN) 100 UNIT/ML injection Inject Subcutaneous 4 times daily (with meals and nightly) Sliding scale       insulin glargine (LANTUS) 100 UNIT/ML injection Inject 45 Units Subcutaneous At Bedtime        isosorbide mononitrate (IMDUR) 60 MG 24 hr tablet Take 60 mg by mouth every morning        losartan " "(COZAAR) 25 MG tablet Take 25 mg by mouth every morning        metFORMIN (GLUCOPHAGE) 500 MG tablet Take 1,000 mg by mouth 2 times daily (with meals)        potassium gluconate 2.5 MEQ TABS tablet Take 2.5 mEq by mouth every morning        simvastatin (ZOCOR) 40 MG tablet Take 40 mg by mouth At Bedtime        Multiple Vitamins-Minerals (MULTIVITAMIN ADULT) TABS Take 1 tablet by mouth every morning         Allergies  No Known Allergies    Social History  Social History     Social History     Marital status:      Spouse name: N/A     Number of children: N/A     Years of education: N/A     Occupational History     Not on file.     Social History Main Topics     Smoking status: Never Smoker     Smokeless tobacco: Never Used     Alcohol use No     Drug use: No     Sexual activity: Not on file     Other Topics Concern     Not on file     Social History Narrative       Family History  No family history on file.    Review of Systems  Preprocedure Note          No anesthesia note exists          The complete review of systems is negative other than noted in the HPI or here.       BP: 136/73 Pulse: 69   Resp: 18 SpO2: 94 %         239 lbs 14.4 oz  5' 7\"   Body mass index is 37.57 kg/(m^2).       Physical Exam  Constitutional: Awake, alert, cooperative, no apparent distress, and appears stated age.  Eyes: Pupils equal, round and reactive to light, extra ocular muscles intact, sclera clear, conjunctiva normal.  HENT: Normocephalic, oral pharynx with moist mucus membranes, good dentition. No goiter appreciated.   Respiratory: Clear to auscultation bilaterally, no crackles or wheezing.  Cardiovascular: Regular rate and rhythm, normal S1 and S2, and no murmur noted.  Carotids +2, no bruits. No edema. Palpable pulses to radial  DP and PT arteries.   GI: Normal bowel sounds, soft, non-distended, non-tender, no masses palpated, no hepatosplenomegaly.  Surgical scars: ***  Lymph/Hematologic: No cervical lymphadenopathy and no " "supraclavicular lymphadenopathy.  Genitourinary:  ***  Skin: Warm and dry.  No rashes at anticipated surgical site.   Musculoskeletal: Full ROM of neck. There is no redness, warmth, or swelling of the joints. Gross motor strength is normal.    Neurologic: Awake, alert, oriented to name, place and time. Cranial nerves II-XII are grossly intact. Gait is normal.   Neuropsychiatric: Calm, cooperative. Normal affect.     Labs: (personally reviewed)  EKG: Personally reviewed but formal cardiology read pending: ***  Cardiac echo: ***  Stress test: ***  PFT's: ***    Outside records reviewed from: ***    ASSESSMENT and PLAN  Larry Wegener is a 67 year old male scheduled to undergo ***. {He/She:264140} has the following specific operative considerations:   - RCRI : {PREOP REVISED CARDIAC INDEX (RCI):191238::\"No serious cardiac risks\"}.  *** risk of major adverse cardiac event.   - Anesthesia considerations:  Refer to PAC assessment in anesthesia records  - VTE risk:  - VICTOR M # of risks ***/8 = ***  - Post-op delirium risk: ***  - If afib, [id number] score ***.  Risk category ***.    - Risk of PONV score = ***.  If > 2, anti-emetic intervention recommended.  - If on chronic opiates, morphine equivalent = *** (if > 60mg/24 hr--> needs pain team consult)    Patient was discussed with { PAC Providers:[id number]}.    Florentin Gonzalez MD  Preoperative Assessment Center  [facility]  [facility]  Phone: [phone]  Fax: [phone]  "

## 2017-03-22 NOTE — ANESTHESIA PREPROCEDURE EVALUATION
Anesthesia Evaluation     . Pt has had prior anesthetic. Type: General and MAC           ROS/MED HX    ENT/Pulmonary:     (+)VICTOR M risk factors snores loudly, hypertension, obese, , . .    Neurologic:  - neg neurologic ROS     Cardiovascular:     (+) hypertension----. Taking blood thinners : Instructions Given to patient: asa, on hold. . . :. . Previous cardiac testing Echodate:7/10/2015results:Stress Testdate: results:ECG reviewed date:3/22/2017 results:SR, 1st degree AVB, RBBB date: results:          METS/Exercise Tolerance:  4 - Raking leaves, gardening   Hematologic:  - neg hematologic  ROS       Musculoskeletal:  - neg musculoskeletal ROS       GI/Hepatic:     (+) GERD Asymptomatic on medication,       Renal/Genitourinary:     (+) Other Renal/ Genitourinary, left renal mass, bladder cancer      Endo:     (+) type II DM Last HgA1c: 7.5 date: 8/2016 Using insulin - not using insulin pump Obesity, .      Psychiatric:  - neg psychiatric ROS       Infectious Disease:  - neg infectious disease ROS       Malignancy:   (+) Malignancy History of Other  Other CA renal, bladder Active status post         Other:    (+) No chance of pregnancy C-spine cleared: N/A, no H/O Chronic Pain,no other significant disability                    Physical Exam  Normal systems: cardiovascular, pulmonary and dental    Airway   Mallampati: II  TM distance: >3 FB  Neck ROM: full    Dental     Cardiovascular   Rhythm and rate: regular and normal      Pulmonary    breath sounds clear to auscultation    Other findings:   Echo 7/10/2015  RESULTS  LEFT ATRIUM:    Left atrial size is enlarged at 4.1.    MITRAL VALVE:    Mild MR.    LEFT VENTRICLE:    Left ventricular size is normal. Ejection fraction is approximately 45-50% with inferior and inferolateral hypokinesis.     AORTIC VALVE:    No AS. No aortic insufficiency.     AORTA:    Normal.    RIGHT ATRIUM:    Normal size.    TRICUSPID VALVE:    Mild TR.    RIGHT VENTRICLE:    Normal size.      PULMONIC VALVE:    Mild pulmonic insufficiency.     PERICARDIUM:    No pericardial effusion.    ADDITIONAL COMMENTS:    The interatrial septum is intact.     CONCLUSION:  1. Normal LV size.  Ejection fraction is approximately 45-50%. Inferolateral and inferior hypokinesis. Diastolic function is stage I.    2. No AS. No aortic insufficiency.     3. Mild MR.    4. Mild pulmonic insufficiency.     5. Mild TR.     6. No pericardial effusion.     7. Left atrial enlargement.     8. No previous echocardiogram for comparison.       Lab Results      Component                Value               Date                      WBC                      7.9                 03/22/2017            Lab Results      Component                Value               Date                      RBC                      4.60                03/22/2017            Lab Results      Component                Value               Date                      HGB                      13.5                03/22/2017            Lab Results      Component                Value               Date                      HCT                      39.4                03/22/2017            No components found for: MCT  Lab Results      Component                Value               Date                      MCV                      86                  03/22/2017            Lab Results      Component                Value               Date                      MCH                      29.3                03/22/2017            Lab Results      Component                Value               Date                      MCHC                     34.3                03/22/2017            Lab Results      Component                Value               Date                      RDW                      13.2                03/22/2017            Lab Results      Component                Value               Date                      PLT                      209                 03/22/2017               Last Basic Metabolic Panel:  Lab Results      Component                Value               Date                      NA                       139                 03/22/2017             Lab Results      Component                Value               Date                      POTASSIUM                4.2                 03/22/2017            Lab Results      Component                Value               Date                      CHLORIDE                 100                 03/22/2017            Lab Results      Component                Value               Date                      FORREST                      8.3                 03/22/2017            Lab Results      Component                Value               Date                      CO2                      32                  03/22/2017            Lab Results      Component                Value               Date                      BUN                      21                  03/22/2017            Lab Results      Component                Value               Date                      CR                       1.04                03/22/2017            Lab Results      Component                Value               Date                      GLC                      173                 03/22/2017                INR   0.99   3/22/2017    UA RESULTS:  Lab Test        03/22/17                       1521          COLOR        Yellow        APPEARANCE   Clear         URINEGLC     Negative      URINEBILI    Negative      URINEKETONE  Negative      SG           1.016         UBLD         Negative      URINEPH      5.0           PROTEIN      Negative      NITRITE      Negative      LEUKEST      Negative          Stress test 4/4/2017  Interpretation Summary  Technically difficult study limited by baseline wall motion abnormalities and  difficult acoustic windows.  High risk exercise stress echocardiogram.  Inferior akinesis at rest and peak exercise with associated  mid-anterolateral  hypokinesis at peak exercise.  Mild to moderately reduced LV systolic function at rest. The LVEF is 40-45%  with minimal increase to 45-50% with exercise.  Normal blood pressure response to exercise.  EKG demonstrates baseline RBBB and Q waves inferior and inferolaterally. No  EKG evidence of ischemia at peak exercise.  No subjective evidence of ischemia at rest and at peak exercise.  No significant valvular disease noted on routine screening color flow Doppler  and pulsed Doppler examination.  In aggregate, these findings are c/w extensive prior myocardial infarction in  the inferior and lateral myocardial wall with perinfarct inducible ischemia in  the lateral myocardial segment.           PAC Discussion and Assessment    ASA Classification: 3  Case is suitable for: [employer]  Anesthetic techniques and relevant risks discussed: GA  Invasive monitoring and risk discussed: Yes  Types:   Possibility and Risk of blood transfusion discussed: Yes  NPO instructions given: No solids 6 hours before surgery, stop clear liquids 2 hours before surgery  Additional anesthetic preparation and risks discussed:   Needs early admission to pre-op area:   Other:     PAC Resident/NP Anesthesia Assessment:  Larry Wegener is a 68 yo male scheduled for Left Open Partial Nephrectomy Flank Incision Possible Nephrectomy on 4/5/2017 by Dr. Mitchell. PAC referral by Dr. Mitchell for assessment and optimization of anesthesia due to HTN, CABG and DM II. Previous anesthesia without complications.    Cardiac: HTN. PCI 1997, 3 vessel CABG 2004. Echo 1/10/2015, results above. EKG today SR with 1st degree AVB and RBBB. Denies cardiac symtoms. Will obtain stress test. Results above  Pulmonary:  No current pulmonary symptoms. Risk of VICTOR M  GI: GERD, daily Pepcid.  Renal: presented with gross hematuria. He was found to have a bladder tumor and then a 4.5 cm left renal mass. Dr. Mclaughlin has since resected his bladder tumor and found T1  high grade bladder cancer. She is planning a re-resection. Of note, there was muscle in the initial specimen.     Can walk 6-7 blocks without stopping. Feasible airway     Pt also evaluated by Dr. Gonzalez. See recommendations below. Type and screen drawn, CBC, BMP done today. UA done  I spent 20 minutes face to face with pt, assessing, examining, and educating      Reviewed and Signed by PAC Mid-Level Provider/Resident  Mid-Level Provider/Resident: Suzanna Rushing, APRN  Date: 3/22/2016  Time: 1500    Attending Anesthesiologist Anesthesia Assessment:  I have reviewed the history and physical examination with the LA.  I have personally examined the patient.  The patient has a history of coronary artery disease including coronary stent in 1995 and CABG in 2004.  He denies having angina but has limited MET activity - 3-4 at this time.  An echocardiogram is remarkable for decreased EF of 45% and today's EKG is remarkable for RBBB and inferior ischemic changes.  Given these findings the patient requires stress testing prior to intraabdominal surgery.  This is being arranged at this time.      Reviewed and Signed by PAC Anesthesiologist  Anesthesiologist: Florentin Gonzalez  Date: 03/22/2017  Time: 1454  Pass/Fail:   Disposition:     PAC Pharmacist Assessment:        Pharmacist:   Date:   Time:      Anesthesia Plan      History & Physical Review  History and physical reviewed and following examination; no interval change.    ASA Status:  4 .        Plan for ETT and General with Intravenous induction. Maintenance will be Balanced.    PONV prophylaxis:  Ondansetron (or other 5HT-3) and Other (See comment)  Additional equipment: Videolaryngoscope, Arterial Line and 2nd IV I am familiar with the patient having evaluated him in the PAC.  I am aware that his stress test came back positive and that the cardiologist reviewing it felt we should proceed with surgery rather than delay surgery for protracted length of time if FEDERICO  needed.    Will use FLoTrac in addition to A line placed pre induction.  Will draw preop bnp.    Florentin Gonzalez MD      Postoperative Care  Postoperative pain management:  IV analgesics.  Plan for postoperative opioid use.    Consents  Anesthetic plan, risks, benefits and alternatives discussed with:  Patient..                          .

## 2017-03-22 NOTE — PROGRESS NOTES
Preoperative Assessment Center medication history for March 22, 2017 is complete.  See Epic admission navigator for allergy information, pharmacy, prior to admission medications and immunization status.    Operating room staff will still need to confirm medications and last dose information on day of surgery.     Medication history interview sources:  patient, med list from OSH    Changes made to PTA medication list (reason)  Added: acetaminophen  Afrin - takes PRN but using basically qhs the past 2 weeks  Deleted: none  Changed: chlorthalidone dose changed to 25 mg.   addeds sig for novolog        Additional medication history information (including reliability of information, actions taken by pharmacist):None    Prior to Admission medications    Medication Sig Last Dose Taking? Auth Provider   atenolol (TENORMIN) 50 MG tablet Take 50 mg by mouth every morning  Taking Yes Reported, Patient   chlorthalidone (HYGROTON) 25 MG tablet Take 25 mg by mouth every morning  Taking Yes Reported, Patient   famotidine (PEPCID) 40 MG tablet Take 40 mg by mouth 2 times daily  Taking Yes Reported, Patient   insulin aspart (NOVOLOG PEN) 100 UNIT/ML injection Inject Subcutaneous 4 times daily (with meals and nightly) Takes base of 12 units +SSI at breakfast   Takes base of 12 units +SSI at lunch  Takes base of 14 units +SSI at supper  Takes base of 12 units +SSI at bedtime Taking Yes Reported, Patient   insulin glargine (LANTUS) 100 UNIT/ML injection Inject 45 Units Subcutaneous At Bedtime  Taking Yes Reported, Patient   isosorbide mononitrate (IMDUR) 60 MG 24 hr tablet Take 60 mg by mouth every morning  Taking Yes Reported, Patient   losartan (COZAAR) 25 MG tablet Take 25 mg by mouth every morning  Taking Yes Reported, Patient   metFORMIN (GLUCOPHAGE) 500 MG tablet Take 1,000 mg by mouth 2 times daily (with meals)  Taking Yes Reported, Patient   potassium gluconate 2.5 MEQ TABS tablet Take 2.5 mEq by mouth every morning  Taking Yes  Reported, Patient   simvastatin (ZOCOR) 40 MG tablet Take 40 mg by mouth At Bedtime  Taking Yes Reported, Patient   Multiple Vitamins-Minerals (MULTIVITAMIN ADULT) TABS Take 1 tablet by mouth every evening  Taking Yes Reported, Patient   Omega-3 Fatty Acids (FISH OIL PO) March 22, 2017 - Patient has been holding this medication in preparation for surgery.  Not taken since December 2016.  Plans to resume after surgery in April, will continue to hold for now.  Previously was on fish oil 1000 mg by mouth twice daily. Taking Yes Unknown, Entered By History   NITROGLYCERIN SL Place 0.4 mg under the tongue every 5 minutes as needed for chest pain Taking Yes Unknown, Entered By History   oxymetazoline (AFRIN) 0.05 % spray Spray 2 sprays into both nostrils 2 times daily as needed for congestion Taking Yes Unknown, Entered By History   ACETAMINOPHEN PO Take 500-1,000 mg by mouth every 8 hours as needed for pain Taking Yes Unknown, Entered By History   aspirin 325 MG tablet March 22, 2017 - Patient has been holding this medication in preparation for surgery.  Not taken since December 2016.  Plans to resume after surgery in April, will continue to hold for now.  Previously was on 325 mg by mouth daily. Not Taking  Reported, Patient          Medication history completed by: Ritesh Burgos [facility]

## 2017-03-22 NOTE — MR AVS SNAPSHOT
After Visit Summary   3/22/2017    Larry Wegener    MRN: [id number]           Patient Information     Date Of Birth          1950        Visit Information        Provider Department      3/22/2017 1:00 PM Pharmacist, Adama Norwood [facility] Pre[facility]        Today's Diagnoses     Preop examination    -  1       Follow-ups after your visit        Your next 10 appointments already scheduled     Apr 05, 2017   Procedure with Joe Mitchell MD   [facility], [place], Same Day Surgery (--)    [address]  [facility] [id number] [phone]            Apr 21, 2017  1:00 PM CDT   (Arrive by 12:45 PM)   Post-Op with Joe Mitchell MD   [facility]rologic C[facility] ([facility])    [address]  [address]th [facility] [address] [phone]              Future tests that were ordered for you today     Open Future Orders        Priority Expected Expires Ordered    Exercise Stress Echocardiogram Routine  4/21/2017 3/22/2017            Who to contact     Please call your clinic at [phone] to:    Ask questions about your health    Make or cancel appointments    Discuss your medicines    Learn about your test results    Speak to your doctor   If you have compliments or concerns about an experience at your clinic, or if you wish to file a complaint, please contact [facility] Patient Relations at [phone] or email us at [email or web address]         Additional Information About Your Visit        MyChart Information     [email or web address] gives you secure access to your electronic health record. If you see a primary care provider, you can also send messages to your care team and make appointments. If you have questions, please call your primary care clinic.  If you do not have a primary care provider, please call [phone] and they will assist you.      [employer] is an electronic gateway  that provides easy, online access to your medical records. With [employer], you can request a clinic appointment, read your test results, renew a prescription or communicate with your care team.     To access your existing account, please contact your [facility] or call [phone] for assistance.        Care EveryWhere ID     This is your Care EveryWhere ID. This could be used by other organizations to access your [place] medical records  [id number]         Blood Pressure from Last 3 Encounters:   03/22/17 136/73   02/17/17 147/83    Weight from Last 3 Encounters:   03/22/17 108.8 kg (239 lb 14.4 oz)   02/17/17 104.7 kg (230 lb 12.8 oz)              Today, you had the following     No orders found for display       Primary Care Provider    Provider Unknown       No address on file        Thank you!     Thank you for choosing [facility]  for your care. Our goal is always to provide you with excellent care. Hearing back from our patients is one way we can continue to improve our services. Please take a few minutes to complete the written survey that you may receive in the mail after your visit with us. Thank you!             Your Updated Medication List - Protect others around you: Learn how to safely use, store and throw away your medicines at www.disposemymeds.org.          This list is accurate as of: 3/22/17  3:33 PM.  Always use your most recent med list.                   Brand Name Dispense Instructions for use    ACETAMINOPHEN PO      Take 500-1,000 mg by mouth every 8 hours as needed for pain       aspirin 325 MG tablet      March 22, 2017 - Patient has been holding this medication in preparation for surgery.  Not taken since December 2016.  Plans to resume after surgery in April, will continue to hold for now.  Previously was on 325 mg by mouth daily.       atenolol 50 MG tablet    TENORMIN     Take 50 mg by mouth every morning        chlorthalidone 25 MG tablet    HYGROTON     Take 25 mg by mouth every morning       famotidine 40 MG tablet    PEPCID     Take 40 mg by mouth 2 times daily       FISH OIL PO      March 22, 2017 - Patient has been holding this medication in preparation for surgery.  Not taken since December 2016.  Plans to resume after surgery in April, will continue to hold for now.  Previously was on fish oil 1000 mg by mouth twice daily.       insulin aspart 100 UNIT/ML injection    NovoLOG PEN     Inject Subcutaneous 4 times daily (with meals and nightly) Takes base of 12 units +SSI at breakfast  Takes base of 12 units +SSI at lunch Takes base of 14 units +SSI at supper Takes base of 12 units +SSI at bedtime       insulin glargine 100 UNIT/ML injection    LANTUS     Inject 45 Units Subcutaneous At Bedtime       isosorbide mononitrate 60 MG 24 hr tablet    IMDUR     Take 60 mg by mouth every morning       losartan 25 MG tablet    COZAAR     Take 25 mg by mouth every morning       metFORMIN 500 MG tablet    GLUCOPHAGE     Take 1,000 mg by mouth 2 times daily (with meals)       MULTIVITAMIN ADULT Tabs      Take 1 tablet by mouth every evening       NITROGLYCERIN SL      Place 0.4 mg under the tongue every 5 minutes as needed for chest pain       oxymetazoline 0.05 % spray    AFRIN     Spray 2 sprays into both nostrils 2 times daily as needed for congestion       potassium gluconate 2.5 MEQ Tabs tablet      Take 2.5 mEq by mouth every morning       simvastatin 40 MG tablet    ZOCOR     Take 40 mg by mouth At Bedtime

## 2017-03-22 NOTE — MR AVS SNAPSHOT
After Visit Summary   3/22/2017    Larry Wegener    MRN: [id number]           Patient Information     Date Of Birth          1950        Visit Information        Provider Department      3/22/2017 2:30 PM Rn, [facility] Preoperative Assessment [facility]        Care Instructions    Preparing for Your Surgery  Name:  Larry Wegener   MRN:  [id number]   :  1950   Today's Date:  3/22/2017     Arriving for surgery:  Surgery date:  17  Surgery time:  7:45 AM  Arrival time:  5:45 AM    Please come to:     [facility]  [address] [place] [address]    -   parking is available in front of the hospital from 5:15 am to 8:00 pm    -  Stop at the Information Desk in the lobby    -   Inform the information person that you are here for surgery. An escort to 3c will be provided. If you would not like an escort, please proceed to 3C on the 3rd floor. [phone]     What can I eat or drink?  -  You may have solid food or milk products until 8 hours prior to your surgery.  -  You may have clear liquids such as: water, gatorade, tea, black coffee, jello, broth, apple juice or 7up/Sprite until 2 hours prior to your surgery.    Which medicines can I take?        -  Please hold vitamins for 7 days before surgery.        -  Please take 36 units Lantus Insulin the evening before surgery.    -  Do NOT take these medications in the morning, the day of surgery:  CHLORTHALIDONE,  ASPART INSULIN,  LOSARTAN,  METFORMIN.    -  Please take these medications the day of surgery:  ALL OTHER REGULARLY SCHEDULED MORNING MEDICATIONS.    How do I prepare myself?  -  Take two showers: one the night before surgery; and one the morning of surgery.         Use Scrubcare or Hibiclens to wash from neck down.  You may use your own shampoo and conditioner. No other hair products.   -  Do NOT use lotion, powder, deodorant, or antiperspirant the day of your surgery.  -  Do  NOT wear any makeup, fingernail polish or jewelry.  -  Begin using Incentive Spirometer 1 week prior to surgery.  Use 4 times per day, up to 5-10 breaths each time.  Bring Incentive Spirometer to hospital.  -  Do not bring your own medications to the hospital, except for inhalers and eye drops.  -  Bring your ID and insurance card.    Questions or Concerns:  If you have questions or concerns, please call the  Preoperative Assessment Center, Monday-Friday 7AM-7PM:  [phone]    AFTER YOUR SURGERY  Breathing exercises   Breathing exercises help you recover faster. Take deep breaths and let the air out slowly. This will:     Help you wake up after surgery.    Help prevent complications like pneumonia.  Preventing complications will help you go home sooner.   We may give you a breathing device (incentive spirometer) to encourage you to breathe deeply.     Using an Incentive Spirometer  Soon after your surgery, a nurse or therapist will teach you breathing exercises. These keep your lungs clear, strengthen your breathing muscles, and help prevent complications.  The exercises include doing a deep-breathing exercise using a device called an incentive spirometer.  To do these exercises, you will breathe in through your mouth and not your nose. The incentive spirometer only works correctly if you breathe in through your mouth.  Four steps to clear lungs     Deep breathing expands the lungs, aids circulation, and helps prevent pneumonia.   1. Exhale normally.    Relax and breathe out.  2. Place your lips tightly around the mouthpiece.    Make sure the device is upright and not tilted.  3. Inhale as much air as you can through the mouthpiece (don't breath through your nose).    Inhale slowly and deeply.    Hold your breath long enough to keep the balls or disk raised for at least 3 seconds.    If you re inhaling too quickly, your device may make a tone. If you hear this tone, inhale more slowly.  4. Repeat the exercise  regularly.    Do this exercise every hour while you're awake, or as your health care provider instructs.    You will also be taught coughing exercises and be asked to do them regularly on your own.    2438-7713 [employer]. [address] [place] [address], [place] [address]. All rights reserved. This information is not intended as a substitute for professional medical care. Always follow your healthcare professional's instructions.        Nausea and vomiting   You may feel sick to your stomach after surgery; if so, let your nurse know.    Pain control:  After surgery, you may have pain. Our goal is to help you manage your pain. Pain medicine will help you feel comfortable enough to do activities that will help you heal.  These activities may include breathing exercises, walking and physical therapy.   To help your health care team treat your pain we will ask: 1) If you have pain  2) where it is located 3) describe your pain in your words  Methods of pain control include medications given by mouth, vein or by nerve block for some surgeries.  We may give you a pain control pump that will:  1) Deliver the medicine through a tube placed in your vein  2) Control the amount of medicine you receive  3) Allow you to push a button to deliver a dose of pain medicine  Sequential Compression Device (SCD) or Pneumo Boots:  You may need to wear SCD S on your legs or feet. These are wraps connected to a machine that pumps in air and releases it. The repeated pumping helps prevent blood clots from forming.         Follow-ups after your visit        Your next 10 appointments already scheduled     Mar 22, 2017  2:30 PM CDT   (Arrive by 2:15 PM)   PAC RN ASSESSMENT with Adama Pac Rn   [facility] ([facility])    [address] [place] [id number] [phone]            Mar 22, 2017  3:00 PM CDT   (Arrive by 2:45 PM)   PAC Anesthesia Consult with Uc Pac  Anesthesiologist   [facility] Preoperative Assessment Center ([facility])    [address]  4th Floor  [facility] [id number] [phone]            Apr 05, 2017   Procedure with Joe Mitchell MD   [facility], [place], Same Day Surgery (--)    [address]  [place] [address]   [phone]            Apr 21, 2017  1:00 PM CDT   (Arrive by 12:45 PM)   Post-Op with Joe Mitchell MD   [facility] and Inst for Prostate and Urologic Cancers ([facility])    [address]  4th [facility] [id number] [phone]              Future tests that were ordered for you today     Open Future Orders        Priority Expected Expires Ordered    ABO/Rh type and screen Routine 3/22/2017 4/21/2017 3/22/2017    Basic metabolic panel Routine 3/22/2017 4/21/2017 3/22/2017    CBC with platelets Routine 3/22/2017 4/21/2017 3/22/2017    INR Routine 3/22/2017 4/21/2017 3/22/2017    UA reflex to Microscopic and Culture Routine 3/22/2017 4/21/2017 3/22/2017            Who to contact     Please call your clinic at [phone] to:    Ask questions about your health    Make or cancel appointments    Discuss your medicines    Learn about your test results    Speak to your doctor   If you have compliments or concerns about an experience at your clinic, or if you wish to file a complaint, please contact [facility] Patient Relations at [phone] or email us at [email or web address]         Additional Information About Your Visit        MyChart Information     Ch[employer]WIFIhart gives you secure access to your electronic health record. If you see a primary care provider, you can also send messages to your care team and make appointments. If you have questions, please call your primary care clinic.  If you do not have a primary care provider, please call [phone] and they will assist you.      [employer] is an electronic  gateway that provides easy, online access to your medical records. With [employer], you can request a clinic appointment, read your test results, renew a prescription or communicate with your care team.     To access your existing account, please contact your [facility] or call [phone] for assistance.        Care EveryWhere ID     This is your Care EveryWhere ID. This could be used by other organizations to access your [place] medical records  [id number]         Blood Pressure from Last 3 Encounters:   03/22/17 136/73   02/17/17 147/83    Weight from Last 3 Encounters:   03/22/17 108.8 kg (239 lb 14.4 oz)   02/17/17 104.7 kg (230 lb 12.8 oz)              Today, you had the following     No orders found for display       Primary Care Provider    Provider Unknown       No address on file        Thank you!     Thank you for choosing [facility] PREOPERATIVE ASSESSMENT CENTER  for your care. Our goal is always to provide you with excellent care. Hearing back from our patients is one way we can continue to improve our services. Please take a few minutes to complete the written survey that you may receive in the mail after your visit with us. Thank you!             Your Updated Medication List - Protect others around you: Learn how to safely use, store and throw away your medicines at www.disposemymeds.org.          This list is accurate as of: 3/22/17  2:22 PM.  Always use your most recent med list.                   Brand Name Dispense Instructions for use    aspirin 325 MG tablet      Take 325 mg by mouth daily       atenolol 50 MG tablet    TENORMIN     Take 50 mg by mouth every morning       chlorthalidone 25 MG tablet    HYGROTON     Take 50 mg by mouth every morning       famotidine 40 MG tablet    PEPCID     Take 40 mg by mouth 2 times daily       insulin aspart 100 UNIT/ML injection    NovoLOG PEN     Inject Subcutaneous 4 times daily (with meals and nightly) Sliding scale        insulin glargine 100 UNIT/ML injection    LANTUS     Inject 45 Units Subcutaneous At Bedtime       isosorbide mononitrate 60 MG 24 hr tablet    IMDUR     Take 60 mg by mouth every morning       losartan 25 MG tablet    COZAAR     Take 25 mg by mouth every morning       metFORMIN 500 MG tablet    GLUCOPHAGE     Take 1,000 mg by mouth 2 times daily (with meals)       MULTIVITAMIN ADULT Tabs      Take 1 tablet by mouth every morning       potassium gluconate 2.5 MEQ Tabs tablet      Take 2.5 mEq by mouth every morning       simvastatin 40 MG tablet    ZOCOR     Take 40 mg by mouth At Bedtime

## 2017-03-23 LAB — INTERPRETATION ECG - MUSE: NORMAL

## 2017-04-04 ENCOUNTER — HOSPITAL ENCOUNTER (OUTPATIENT)
Dept: CARDIOLOGY | Facility: CLINIC | Age: 67
Discharge: HOME | DRG: 657 | End: 2017-04-04
Attending: NURSE PRACTITIONER | Admitting: NURSE PRACTITIONER
Payer: MEDICARE

## 2017-04-04 DIAGNOSIS — Z01.810 ENCOUNTER FOR PREPROCEDURAL CARDIOVASCULAR EXAMINATION: ICD-10-CM

## 2017-04-04 DIAGNOSIS — Z01.818 PREOP GENERAL PHYSICAL EXAM: ICD-10-CM

## 2017-04-04 PROCEDURE — 93018 CV STRESS TEST I&R ONLY: CPT | Performed by: INTERNAL MEDICINE

## 2017-04-04 PROCEDURE — 93350 STRESS TTE ONLY: CPT | Mod: 26 | Performed by: INTERNAL MEDICINE

## 2017-04-04 PROCEDURE — 93321 DOPPLER ECHO F-UP/LMTD STD: CPT | Mod: 26 | Performed by: INTERNAL MEDICINE

## 2017-04-04 PROCEDURE — 93016 CV STRESS TEST SUPVJ ONLY: CPT | Performed by: INTERNAL MEDICINE

## 2017-04-04 PROCEDURE — 93325 DOPPLER ECHO COLOR FLOW MAPG: CPT | Mod: 26 | Performed by: INTERNAL MEDICINE

## 2017-04-04 RX ADMIN — PERFLUTREN 15 ML: 6.52 INJECTION, SUSPENSION INTRAVENOUS at 13:30

## 2017-04-05 ENCOUNTER — HOSPITAL ENCOUNTER (INPATIENT)
Facility: CLINIC | Age: 67
LOS: 5 days | Discharge: HOME | DRG: 657 | End: 2017-04-10
Attending: UROLOGY | Admitting: UROLOGY
Payer: MEDICARE

## 2017-04-05 ENCOUNTER — APPOINTMENT (OUTPATIENT)
Dept: GENERAL RADIOLOGY | Facility: CLINIC | Age: 67
DRG: 657 | End: 2017-04-05
Attending: UROLOGY
Payer: MEDICARE

## 2017-04-05 ENCOUNTER — ANESTHESIA (OUTPATIENT)
Dept: SURGERY | Facility: CLINIC | Age: 67
DRG: 657 | End: 2017-04-05
Payer: MEDICARE

## 2017-04-05 DIAGNOSIS — C64.9 RENAL CANCER, UNSPECIFIED LATERALITY (H): Primary | ICD-10-CM

## 2017-04-05 LAB
ABO + RH BLD: NORMAL
ABO + RH BLD: NORMAL
ANION GAP SERPL CALCULATED.3IONS-SCNC: 10 MMOL/L (ref 3–14)
BASE DEFICIT BLDA-SCNC: 0.3 MMOL/L
BASE DEFICIT BLDA-SCNC: 0.6 MMOL/L
BASE DEFICIT BLDA-SCNC: 0.9 MMOL/L
BLD GP AB SCN SERPL QL: NORMAL
BLD PROD TYP BPU: NORMAL
BLOOD BANK CMNT PATIENT-IMP: NORMAL
BLOOD BANK CMNT PATIENT-IMP: NORMAL
BUN SERPL-MCNC: 30 MG/DL (ref 7–30)
CA-I BLD-MCNC: 4.3 MG/DL (ref 4.4–5.2)
CA-I BLD-MCNC: 4.3 MG/DL (ref 4.4–5.2)
CA-I BLD-MCNC: 4.5 MG/DL (ref 4.4–5.2)
CALCIUM SERPL-MCNC: 7.8 MG/DL (ref 8.5–10.1)
CHLORIDE SERPL-SCNC: 99 MMOL/L (ref 94–109)
CO2 SERPL-SCNC: 27 MMOL/L (ref 20–32)
CREAT SERPL-MCNC: 1.1 MG/DL (ref 0.66–1.25)
CREAT SERPL-MCNC: 1.15 MG/DL (ref 0.66–1.25)
ERYTHROCYTE [DISTWIDTH] IN BLOOD BY AUTOMATED COUNT: 13.5 % (ref 10–15)
GFR SERPL CREATININE-BSD FRML MDRD: 63 ML/MIN/1.7M2
GFR SERPL CREATININE-BSD FRML MDRD: 66 ML/MIN/1.7M2
GLUCOSE BLD-MCNC: 170 MG/DL (ref 70–99)
GLUCOSE BLD-MCNC: 235 MG/DL (ref 70–99)
GLUCOSE BLD-MCNC: 236 MG/DL (ref 70–99)
GLUCOSE BLDC GLUCOMTR-MCNC: 164 MG/DL (ref 70–99)
GLUCOSE BLDC GLUCOMTR-MCNC: 214 MG/DL (ref 70–99)
GLUCOSE BLDC GLUCOMTR-MCNC: 225 MG/DL (ref 70–99)
GLUCOSE BLDC GLUCOMTR-MCNC: 261 MG/DL (ref 70–99)
GLUCOSE BLDC GLUCOMTR-MCNC: 267 MG/DL (ref 70–99)
GLUCOSE SERPL-MCNC: 241 MG/DL (ref 70–99)
HCO3 BLD-SCNC: 24 MMOL/L (ref 21–28)
HCO3 BLD-SCNC: 25 MMOL/L (ref 21–28)
HCO3 BLD-SCNC: 28 MMOL/L (ref 21–28)
HCT VFR BLD AUTO: 33.8 % (ref 40–53)
HGB BLD-MCNC: 11 G/DL (ref 13.3–17.7)
HGB BLD-MCNC: 11.3 G/DL (ref 13.3–17.7)
HGB BLD-MCNC: 11.7 G/DL (ref 13.3–17.7)
HGB BLD-MCNC: 12 G/DL (ref 13.3–17.7)
HGB BLD-MCNC: 13.8 G/DL (ref 13.3–17.7)
LACTATE BLD-SCNC: 1.8 MMOL/L (ref 0.7–2.1)
MCH RBC QN AUTO: 28.7 PG (ref 26.5–33)
MCHC RBC AUTO-ENTMCNC: 33.4 G/DL (ref 31.5–36.5)
MCV RBC AUTO: 86 FL (ref 78–100)
NT-PROBNP SERPL-MCNC: 331 PG/ML (ref 0–900)
NUM BPU REQUESTED: 2
O2/TOTAL GAS SETTING VFR VENT: 40 %
O2/TOTAL GAS SETTING VFR VENT: 60 %
O2/TOTAL GAS SETTING VFR VENT: 60 %
PCO2 BLD: 38 MM HG (ref 35–45)
PCO2 BLD: 47 MM HG (ref 35–45)
PCO2 BLD: 71 MM HG (ref 35–45)
PH BLD: 7.2 PH (ref 7.35–7.45)
PH BLD: 7.33 PH (ref 7.35–7.45)
PH BLD: 7.41 PH (ref 7.35–7.45)
PLATELET # BLD AUTO: 148 10E9/L (ref 150–450)
PLATELET # BLD AUTO: 149 10E9/L (ref 150–450)
PO2 BLD: 115 MM HG (ref 80–105)
PO2 BLD: 119 MM HG (ref 80–105)
PO2 BLD: 149 MM HG (ref 80–105)
POTASSIUM BLD-SCNC: 3.5 MMOL/L (ref 3.4–5.3)
POTASSIUM BLD-SCNC: 3.8 MMOL/L (ref 3.4–5.3)
POTASSIUM BLD-SCNC: 3.9 MMOL/L (ref 3.4–5.3)
POTASSIUM SERPL-SCNC: 3.4 MMOL/L (ref 3.4–5.3)
POTASSIUM SERPL-SCNC: 3.7 MMOL/L (ref 3.4–5.3)
RBC # BLD AUTO: 3.94 10E12/L (ref 4.4–5.9)
SODIUM BLD-SCNC: 135 MMOL/L (ref 133–144)
SODIUM BLD-SCNC: 136 MMOL/L (ref 133–144)
SODIUM BLD-SCNC: 136 MMOL/L (ref 133–144)
SODIUM SERPL-SCNC: 136 MMOL/L (ref 133–144)
SPECIMEN EXP DATE BLD: NORMAL
TROPONIN I SERPL-MCNC: 0.02 UG/L (ref 0–0.04)
WBC # BLD AUTO: 9.2 10E9/L (ref 4–11)

## 2017-04-05 PROCEDURE — A9270 NON-COVERED ITEM OR SERVICE: HCPCS | Mod: GY | Performed by: UROLOGY

## 2017-04-05 PROCEDURE — 82330 ASSAY OF CALCIUM: CPT | Performed by: UROLOGY

## 2017-04-05 PROCEDURE — 88331 PATH CONSLTJ SURG 1 BLK 1SPC: CPT | Performed by: UROLOGY

## 2017-04-05 PROCEDURE — 84132 ASSAY OF SERUM POTASSIUM: CPT | Performed by: UROLOGY

## 2017-04-05 PROCEDURE — 40000940 XR ABDOMEN PORT F1 VW

## 2017-04-05 PROCEDURE — 84295 ASSAY OF SERUM SODIUM: CPT | Performed by: UROLOGY

## 2017-04-05 PROCEDURE — 27210794 ZZH OR GENERAL SUPPLY STERILE: Performed by: UROLOGY

## 2017-04-05 PROCEDURE — 25000128 H RX IP 250 OP 636: Performed by: UROLOGY

## 2017-04-05 PROCEDURE — 83605 ASSAY OF LACTIC ACID: CPT | Performed by: UROLOGY

## 2017-04-05 PROCEDURE — 00000146 ZZHCL STATISTIC GLUCOSE BY METER IP

## 2017-04-05 PROCEDURE — 37000008 ZZH ANESTHESIA TECHNICAL FEE, 1ST 30 MIN: Performed by: UROLOGY

## 2017-04-05 PROCEDURE — 84132 ASSAY OF SERUM POTASSIUM: CPT | Performed by: ANESTHESIOLOGY

## 2017-04-05 PROCEDURE — 88305 TISSUE EXAM BY PATHOLOGIST: CPT | Performed by: UROLOGY

## 2017-04-05 PROCEDURE — 37000009 ZZH ANESTHESIA TECHNICAL FEE, EACH ADDTL 15 MIN: Performed by: UROLOGY

## 2017-04-05 PROCEDURE — 25000125 ZZHC RX 250: Performed by: NURSE ANESTHETIST, CERTIFIED REGISTERED

## 2017-04-05 PROCEDURE — 71000016 ZZH RECOVERY PHASE 1 LEVEL 3 FIRST HR: Performed by: UROLOGY

## 2017-04-05 PROCEDURE — 25000125 ZZHC RX 250: Performed by: ANESTHESIOLOGY

## 2017-04-05 PROCEDURE — 71000017 ZZH RECOVERY PHASE 1 LEVEL 3 EA ADDTL HR: Performed by: UROLOGY

## 2017-04-05 PROCEDURE — 0TB10ZZ EXCISION OF LEFT KIDNEY, OPEN APPROACH: ICD-10-PCS | Performed by: UROLOGY

## 2017-04-05 PROCEDURE — 40000014 ZZH STATISTIC ARTERIAL MONITORING DAILY

## 2017-04-05 PROCEDURE — 36415 COLL VENOUS BLD VENIPUNCTURE: CPT | Performed by: UROLOGY

## 2017-04-05 PROCEDURE — 27210995 ZZH RX 272: Performed by: UROLOGY

## 2017-04-05 PROCEDURE — 85049 AUTOMATED PLATELET COUNT: CPT | Performed by: UROLOGY

## 2017-04-05 PROCEDURE — 85027 COMPLETE CBC AUTOMATED: CPT | Performed by: UROLOGY

## 2017-04-05 PROCEDURE — 40000275 ZZH STATISTIC RCP TIME EA 10 MIN

## 2017-04-05 PROCEDURE — 36000064 ZZH SURGERY LEVEL 4 EA 15 ADDTL MIN - UMMC: Performed by: UROLOGY

## 2017-04-05 PROCEDURE — 82803 BLOOD GASES ANY COMBINATION: CPT | Performed by: UROLOGY

## 2017-04-05 PROCEDURE — 25000565 ZZH ISOFLURANE, EA 15 MIN: Performed by: UROLOGY

## 2017-04-05 PROCEDURE — P9041 ALBUMIN (HUMAN),5%, 50ML: HCPCS | Performed by: NURSE ANESTHETIST, CERTIFIED REGISTERED

## 2017-04-05 PROCEDURE — 25000132 ZZH RX MED GY IP 250 OP 250 PS 637: Mod: GY | Performed by: UROLOGY

## 2017-04-05 PROCEDURE — 80048 BASIC METABOLIC PNL TOTAL CA: CPT | Performed by: UROLOGY

## 2017-04-05 PROCEDURE — 83880 ASSAY OF NATRIURETIC PEPTIDE: CPT | Performed by: ANESTHESIOLOGY

## 2017-04-05 PROCEDURE — 88304 TISSUE EXAM BY PATHOLOGIST: CPT | Performed by: UROLOGY

## 2017-04-05 PROCEDURE — 12000006 ZZH R&B IMCU INTERMEDIATE UMMC

## 2017-04-05 PROCEDURE — 82947 ASSAY GLUCOSE BLOOD QUANT: CPT | Performed by: UROLOGY

## 2017-04-05 PROCEDURE — 36415 COLL VENOUS BLD VENIPUNCTURE: CPT | Performed by: ANESTHESIOLOGY

## 2017-04-05 PROCEDURE — 36000062 ZZH SURGERY LEVEL 4 1ST 30 MIN - UMMC: Performed by: UROLOGY

## 2017-04-05 PROCEDURE — 85018 HEMOGLOBIN: CPT | Performed by: ANESTHESIOLOGY

## 2017-04-05 PROCEDURE — 25000131 ZZH RX MED GY IP 250 OP 636 PS 637: Mod: GY | Performed by: ANESTHESIOLOGY

## 2017-04-05 PROCEDURE — 82565 ASSAY OF CREATININE: CPT | Performed by: ANESTHESIOLOGY

## 2017-04-05 PROCEDURE — 25800025 ZZH RX 258: Performed by: ANESTHESIOLOGY

## 2017-04-05 PROCEDURE — 25000128 H RX IP 250 OP 636: Performed by: ANESTHESIOLOGY

## 2017-04-05 PROCEDURE — 25000128 H RX IP 250 OP 636: Performed by: NURSE ANESTHETIST, CERTIFIED REGISTERED

## 2017-04-05 PROCEDURE — 84484 ASSAY OF TROPONIN QUANT: CPT | Performed by: UROLOGY

## 2017-04-05 PROCEDURE — 88307 TISSUE EXAM BY PATHOLOGIST: CPT | Performed by: UROLOGY

## 2017-04-05 PROCEDURE — 27110038 ZZH RX 271: Performed by: ANESTHESIOLOGY

## 2017-04-05 PROCEDURE — C9399 UNCLASSIFIED DRUGS OR BIOLOG: HCPCS | Performed by: NURSE ANESTHETIST, CERTIFIED REGISTERED

## 2017-04-05 PROCEDURE — 40000171 ZZH STATISTIC PRE-PROCEDURE ASSESSMENT III: Performed by: UROLOGY

## 2017-04-05 RX ORDER — LIDOCAINE HYDROCHLORIDE 20 MG/ML
INJECTION, SOLUTION INFILTRATION; PERINEURAL PRN
Status: DISCONTINUED | OUTPATIENT
Start: 2017-04-05 | End: 2017-04-05

## 2017-04-05 RX ORDER — NITROGLYCERIN 0.4 MG/1
0.4 TABLET SUBLINGUAL EVERY 5 MIN PRN
Status: DISCONTINUED | OUTPATIENT
Start: 2017-04-05 | End: 2017-04-10 | Stop reason: HOSPADM

## 2017-04-05 RX ORDER — DEXAMETHASONE SODIUM PHOSPHATE 4 MG/ML
4 INJECTION, SOLUTION INTRA-ARTICULAR; INTRALESIONAL; INTRAMUSCULAR; INTRAVENOUS; SOFT TISSUE ONCE
Status: COMPLETED | OUTPATIENT
Start: 2017-04-05 | End: 2017-04-05

## 2017-04-05 RX ORDER — FLUMAZENIL 0.1 MG/ML
0.2 INJECTION, SOLUTION INTRAVENOUS
Status: DISCONTINUED | OUTPATIENT
Start: 2017-04-05 | End: 2017-04-05 | Stop reason: HOSPADM

## 2017-04-05 RX ORDER — METOCLOPRAMIDE 5 MG/1
5 TABLET ORAL EVERY 6 HOURS PRN
Status: DISCONTINUED | OUTPATIENT
Start: 2017-04-05 | End: 2017-04-10 | Stop reason: HOSPADM

## 2017-04-05 RX ORDER — ONDANSETRON 4 MG/1
4 TABLET, ORALLY DISINTEGRATING ORAL EVERY 6 HOURS PRN
Status: DISCONTINUED | OUTPATIENT
Start: 2017-04-05 | End: 2017-04-10 | Stop reason: HOSPADM

## 2017-04-05 RX ORDER — AMOXICILLIN 250 MG
1-2 CAPSULE ORAL 2 TIMES DAILY
Status: DISCONTINUED | OUTPATIENT
Start: 2017-04-05 | End: 2017-04-10 | Stop reason: HOSPADM

## 2017-04-05 RX ORDER — OXYMETAZOLINE HYDROCHLORIDE 0.05 G/100ML
2 SPRAY NASAL 2 TIMES DAILY PRN
Status: DISCONTINUED | OUTPATIENT
Start: 2017-04-05 | End: 2017-04-10 | Stop reason: HOSPADM

## 2017-04-05 RX ORDER — DEXTROSE MONOHYDRATE 25 G/50ML
25-50 INJECTION, SOLUTION INTRAVENOUS
Status: DISCONTINUED | OUTPATIENT
Start: 2017-04-05 | End: 2017-04-10 | Stop reason: HOSPADM

## 2017-04-05 RX ORDER — LIDOCAINE 40 MG/G
CREAM TOPICAL
Status: DISCONTINUED | OUTPATIENT
Start: 2017-04-05 | End: 2017-04-10 | Stop reason: HOSPADM

## 2017-04-05 RX ORDER — ONDANSETRON 2 MG/ML
4 INJECTION INTRAMUSCULAR; INTRAVENOUS EVERY 30 MIN PRN
Status: DISCONTINUED | OUTPATIENT
Start: 2017-04-05 | End: 2017-04-05 | Stop reason: HOSPADM

## 2017-04-05 RX ORDER — SIMVASTATIN 40 MG
40 TABLET ORAL AT BEDTIME
Status: DISCONTINUED | OUTPATIENT
Start: 2017-04-05 | End: 2017-04-10 | Stop reason: HOSPADM

## 2017-04-05 RX ORDER — CYCLOBENZAPRINE HCL 5 MG
5 TABLET ORAL 3 TIMES DAILY PRN
Status: DISCONTINUED | OUTPATIENT
Start: 2017-04-05 | End: 2017-04-10 | Stop reason: HOSPADM

## 2017-04-05 RX ORDER — ACETAMINOPHEN 325 MG/1
975 TABLET ORAL ONCE
Status: COMPLETED | OUTPATIENT
Start: 2017-04-05 | End: 2017-04-05

## 2017-04-05 RX ORDER — NALOXONE HYDROCHLORIDE 0.4 MG/ML
.1-.4 INJECTION, SOLUTION INTRAMUSCULAR; INTRAVENOUS; SUBCUTANEOUS
Status: DISCONTINUED | OUTPATIENT
Start: 2017-04-05 | End: 2017-04-06

## 2017-04-05 RX ORDER — ACETAMINOPHEN 325 MG/1
975 TABLET ORAL EVERY 8 HOURS
Status: DISCONTINUED | OUTPATIENT
Start: 2017-04-05 | End: 2017-04-08

## 2017-04-05 RX ORDER — GABAPENTIN 100 MG/1
100 CAPSULE ORAL 3 TIMES DAILY
Status: COMPLETED | OUTPATIENT
Start: 2017-04-05 | End: 2017-04-08

## 2017-04-05 RX ORDER — HEPARIN SODIUM 5000 [USP'U]/.5ML
5000 INJECTION, SOLUTION INTRAVENOUS; SUBCUTANEOUS
Status: COMPLETED | OUTPATIENT
Start: 2017-04-05 | End: 2017-04-05

## 2017-04-05 RX ORDER — FAMOTIDINE 20 MG/1
40 TABLET, FILM COATED ORAL 2 TIMES DAILY
Status: DISCONTINUED | OUTPATIENT
Start: 2017-04-05 | End: 2017-04-10 | Stop reason: HOSPADM

## 2017-04-05 RX ORDER — ACETAMINOPHEN 325 MG/1
650 TABLET ORAL EVERY 4 HOURS PRN
Status: DISCONTINUED | OUTPATIENT
Start: 2017-04-08 | End: 2017-04-10 | Stop reason: HOSPADM

## 2017-04-05 RX ORDER — LIDOCAINE 40 MG/G
CREAM TOPICAL
Status: DISCONTINUED | OUTPATIENT
Start: 2017-04-05 | End: 2017-04-05 | Stop reason: HOSPADM

## 2017-04-05 RX ORDER — MEPERIDINE HYDROCHLORIDE 25 MG/ML
12.5 INJECTION INTRAMUSCULAR; INTRAVENOUS; SUBCUTANEOUS EVERY 5 MIN PRN
Status: DISCONTINUED | OUTPATIENT
Start: 2017-04-05 | End: 2017-04-05 | Stop reason: HOSPADM

## 2017-04-05 RX ORDER — SODIUM CHLORIDE, SODIUM LACTATE, POTASSIUM CHLORIDE, CALCIUM CHLORIDE 600; 310; 30; 20 MG/100ML; MG/100ML; MG/100ML; MG/100ML
INJECTION, SOLUTION INTRAVENOUS CONTINUOUS
Status: DISCONTINUED | OUTPATIENT
Start: 2017-04-05 | End: 2017-04-05 | Stop reason: HOSPADM

## 2017-04-05 RX ORDER — HEPARIN SODIUM 5000 [USP'U]/.5ML
5000 INJECTION, SOLUTION INTRAVENOUS; SUBCUTANEOUS EVERY 8 HOURS
Status: DISCONTINUED | OUTPATIENT
Start: 2017-04-06 | End: 2017-04-10 | Stop reason: HOSPADM

## 2017-04-05 RX ORDER — DEXAMETHASONE SODIUM PHOSPHATE 4 MG/ML
INJECTION, SOLUTION INTRA-ARTICULAR; INTRALESIONAL; INTRAMUSCULAR; INTRAVENOUS; SOFT TISSUE PRN
Status: DISCONTINUED | OUTPATIENT
Start: 2017-04-05 | End: 2017-04-05

## 2017-04-05 RX ORDER — ACETAMINOPHEN 325 MG/1
975 TABLET ORAL EVERY 8 HOURS
Status: DISCONTINUED | OUTPATIENT
Start: 2017-04-06 | End: 2017-04-05 | Stop reason: ALTCHOICE

## 2017-04-05 RX ORDER — FENTANYL CITRATE 50 UG/ML
25-50 INJECTION, SOLUTION INTRAMUSCULAR; INTRAVENOUS
Status: DISCONTINUED | OUTPATIENT
Start: 2017-04-05 | End: 2017-04-05 | Stop reason: HOSPADM

## 2017-04-05 RX ORDER — NALOXONE HYDROCHLORIDE 0.4 MG/ML
.1-.4 INJECTION, SOLUTION INTRAMUSCULAR; INTRAVENOUS; SUBCUTANEOUS
Status: DISCONTINUED | OUTPATIENT
Start: 2017-04-05 | End: 2017-04-05 | Stop reason: HOSPADM

## 2017-04-05 RX ORDER — CEFAZOLIN SODIUM 2 G/100ML
2 INJECTION, SOLUTION INTRAVENOUS
Status: COMPLETED | OUTPATIENT
Start: 2017-04-05 | End: 2017-04-05

## 2017-04-05 RX ORDER — ONDANSETRON 2 MG/ML
4 INJECTION INTRAMUSCULAR; INTRAVENOUS EVERY 6 HOURS PRN
Status: DISCONTINUED | OUTPATIENT
Start: 2017-04-05 | End: 2017-04-10 | Stop reason: HOSPADM

## 2017-04-05 RX ORDER — ISOSORBIDE MONONITRATE 60 MG/1
60 TABLET, EXTENDED RELEASE ORAL EVERY MORNING
Status: DISCONTINUED | OUTPATIENT
Start: 2017-04-06 | End: 2017-04-10 | Stop reason: HOSPADM

## 2017-04-05 RX ORDER — ACETAMINOPHEN 10 MG/ML
1000 INJECTION, SOLUTION INTRAVENOUS ONCE
Status: COMPLETED | OUTPATIENT
Start: 2017-04-05 | End: 2017-04-05

## 2017-04-05 RX ORDER — ONDANSETRON 4 MG/1
4 TABLET, ORALLY DISINTEGRATING ORAL EVERY 30 MIN PRN
Status: DISCONTINUED | OUTPATIENT
Start: 2017-04-05 | End: 2017-04-05 | Stop reason: HOSPADM

## 2017-04-05 RX ORDER — METOCLOPRAMIDE HYDROCHLORIDE 5 MG/ML
5 INJECTION INTRAMUSCULAR; INTRAVENOUS EVERY 6 HOURS PRN
Status: DISCONTINUED | OUTPATIENT
Start: 2017-04-05 | End: 2017-04-10 | Stop reason: HOSPADM

## 2017-04-05 RX ORDER — FENTANYL CITRATE 50 UG/ML
INJECTION, SOLUTION INTRAMUSCULAR; INTRAVENOUS PRN
Status: DISCONTINUED | OUTPATIENT
Start: 2017-04-05 | End: 2017-04-05

## 2017-04-05 RX ORDER — ONDANSETRON 2 MG/ML
INJECTION INTRAMUSCULAR; INTRAVENOUS PRN
Status: DISCONTINUED | OUTPATIENT
Start: 2017-04-05 | End: 2017-04-05

## 2017-04-05 RX ORDER — CEFAZOLIN SODIUM 1 G/3ML
1 INJECTION, POWDER, FOR SOLUTION INTRAMUSCULAR; INTRAVENOUS SEE ADMIN INSTRUCTIONS
Status: DISCONTINUED | OUTPATIENT
Start: 2017-04-05 | End: 2017-04-05 | Stop reason: HOSPADM

## 2017-04-05 RX ORDER — ATENOLOL 25 MG/1
50 TABLET ORAL EVERY MORNING
Status: DISCONTINUED | OUTPATIENT
Start: 2017-04-06 | End: 2017-04-10 | Stop reason: HOSPADM

## 2017-04-05 RX ORDER — PROCHLORPERAZINE MALEATE 5 MG
5 TABLET ORAL EVERY 6 HOURS PRN
Status: DISCONTINUED | OUTPATIENT
Start: 2017-04-05 | End: 2017-04-10 | Stop reason: HOSPADM

## 2017-04-05 RX ORDER — PROPOFOL 10 MG/ML
INJECTION, EMULSION INTRAVENOUS PRN
Status: DISCONTINUED | OUTPATIENT
Start: 2017-04-05 | End: 2017-04-05

## 2017-04-05 RX ORDER — HYDROMORPHONE HYDROCHLORIDE 1 MG/ML
.3-.5 INJECTION, SOLUTION INTRAMUSCULAR; INTRAVENOUS; SUBCUTANEOUS EVERY 5 MIN PRN
Status: DISCONTINUED | OUTPATIENT
Start: 2017-04-05 | End: 2017-04-05 | Stop reason: HOSPADM

## 2017-04-05 RX ORDER — NICOTINE POLACRILEX 4 MG
15-30 LOZENGE BUCCAL
Status: DISCONTINUED | OUTPATIENT
Start: 2017-04-05 | End: 2017-04-10 | Stop reason: HOSPADM

## 2017-04-05 RX ORDER — SCOLOPAMINE TRANSDERMAL SYSTEM 1 MG/1
PATCH, EXTENDED RELEASE TRANSDERMAL PRN
Status: DISCONTINUED | OUTPATIENT
Start: 2017-04-05 | End: 2017-04-05

## 2017-04-05 RX ORDER — ASPIRIN 81 MG/1
81 TABLET, CHEWABLE ORAL DAILY
Status: DISCONTINUED | OUTPATIENT
Start: 2017-04-05 | End: 2017-04-10 | Stop reason: HOSPADM

## 2017-04-05 RX ORDER — DEXTROSE MONOHYDRATE, SODIUM CHLORIDE, AND POTASSIUM CHLORIDE 50; 1.49; 4.5 G/1000ML; G/1000ML; G/1000ML
INJECTION, SOLUTION INTRAVENOUS CONTINUOUS
Status: DISCONTINUED | OUTPATIENT
Start: 2017-04-05 | End: 2017-04-05 | Stop reason: HOSPADM

## 2017-04-05 RX ORDER — SODIUM CHLORIDE 9 MG/ML
INJECTION, SOLUTION INTRAVENOUS CONTINUOUS
Status: DISCONTINUED | OUTPATIENT
Start: 2017-04-05 | End: 2017-04-06

## 2017-04-05 RX ORDER — ALBUMIN, HUMAN INJ 5% 5 %
SOLUTION INTRAVENOUS CONTINUOUS PRN
Status: DISCONTINUED | OUTPATIENT
Start: 2017-04-05 | End: 2017-04-05

## 2017-04-05 RX ORDER — NALOXONE HYDROCHLORIDE 0.4 MG/ML
.1-.4 INJECTION, SOLUTION INTRAMUSCULAR; INTRAVENOUS; SUBCUTANEOUS
Status: DISCONTINUED | OUTPATIENT
Start: 2017-04-05 | End: 2017-04-10 | Stop reason: HOSPADM

## 2017-04-05 RX ADMIN — SIMVASTATIN 40 MG: 40 TABLET, FILM COATED ORAL at 22:01

## 2017-04-05 RX ADMIN — FENTANYL CITRATE 50 MCG: 50 INJECTION, SOLUTION INTRAMUSCULAR; INTRAVENOUS at 11:16

## 2017-04-05 RX ADMIN — PHENYLEPHRINE HYDROCHLORIDE 50 MCG: 10 INJECTION, SOLUTION INTRAMUSCULAR; INTRAVENOUS; SUBCUTANEOUS at 13:43

## 2017-04-05 RX ADMIN — HYDROMORPHONE HYDROCHLORIDE 0.3 MG: 10 INJECTION, SOLUTION INTRAMUSCULAR; INTRAVENOUS; SUBCUTANEOUS at 15:06

## 2017-04-05 RX ADMIN — FENTANYL CITRATE 50 MCG: 50 INJECTION, SOLUTION INTRAMUSCULAR; INTRAVENOUS at 09:37

## 2017-04-05 RX ADMIN — ONDANSETRON 4 MG: 2 INJECTION INTRAMUSCULAR; INTRAVENOUS at 14:15

## 2017-04-05 RX ADMIN — ONDANSETRON 4 MG: 2 INJECTION INTRAMUSCULAR; INTRAVENOUS at 13:49

## 2017-04-05 RX ADMIN — CEFAZOLIN SODIUM 1 G: 2 INJECTION, SOLUTION INTRAVENOUS at 11:37

## 2017-04-05 RX ADMIN — PHENYLEPHRINE HYDROCHLORIDE 50 MCG: 10 INJECTION, SOLUTION INTRAMUSCULAR; INTRAVENOUS; SUBCUTANEOUS at 13:30

## 2017-04-05 RX ADMIN — PHENYLEPHRINE HYDROCHLORIDE 100 MCG: 10 INJECTION, SOLUTION INTRAMUSCULAR; INTRAVENOUS; SUBCUTANEOUS at 12:00

## 2017-04-05 RX ADMIN — ROCURONIUM BROMIDE 20 MG: 10 INJECTION INTRAVENOUS at 10:51

## 2017-04-05 RX ADMIN — PHENYLEPHRINE HYDROCHLORIDE 50 MCG: 10 INJECTION, SOLUTION INTRAMUSCULAR; INTRAVENOUS; SUBCUTANEOUS at 12:11

## 2017-04-05 RX ADMIN — ALBUMIN (HUMAN): 12.5 SOLUTION INTRAVENOUS at 10:04

## 2017-04-05 RX ADMIN — ROCURONIUM BROMIDE 10 MG: 10 INJECTION INTRAVENOUS at 12:43

## 2017-04-05 RX ADMIN — PROCHLORPERAZINE EDISYLATE 5 MG: 5 INJECTION INTRAMUSCULAR; INTRAVENOUS at 15:11

## 2017-04-05 RX ADMIN — ACETAMINOPHEN 975 MG: 325 TABLET, FILM COATED ORAL at 22:05

## 2017-04-05 RX ADMIN — PHENYLEPHRINE HYDROCHLORIDE 200 MCG: 10 INJECTION, SOLUTION INTRAMUSCULAR; INTRAVENOUS; SUBCUTANEOUS at 09:41

## 2017-04-05 RX ADMIN — PHENYLEPHRINE HYDROCHLORIDE 50 MCG: 10 INJECTION, SOLUTION INTRAMUSCULAR; INTRAVENOUS; SUBCUTANEOUS at 12:54

## 2017-04-05 RX ADMIN — PROPOFOL 20 MG: 10 INJECTION, EMULSION INTRAVENOUS at 09:32

## 2017-04-05 RX ADMIN — SODIUM CHLORIDE: 9 INJECTION, SOLUTION INTRAVENOUS at 15:30

## 2017-04-05 RX ADMIN — MIDAZOLAM 1 MG: 1 INJECTION INTRAMUSCULAR; INTRAVENOUS at 08:37

## 2017-04-05 RX ADMIN — GABAPENTIN 100 MG: 100 CAPSULE ORAL at 22:01

## 2017-04-05 RX ADMIN — DEXAMETHASONE SODIUM PHOSPHATE 4 MG: 4 INJECTION, SOLUTION INTRAMUSCULAR; INTRAVENOUS at 10:52

## 2017-04-05 RX ADMIN — SODIUM CHLORIDE 100 ML: 9 INJECTION, SOLUTION INTRAVENOUS at 15:18

## 2017-04-05 RX ADMIN — ACETAMINOPHEN 1000 MG: 10 INJECTION, SOLUTION INTRAVENOUS at 16:32

## 2017-04-05 RX ADMIN — FENTANYL CITRATE 50 MCG: 50 INJECTION, SOLUTION INTRAMUSCULAR; INTRAVENOUS at 13:15

## 2017-04-05 RX ADMIN — ALBUMIN (HUMAN): 12.5 SOLUTION INTRAVENOUS at 12:00

## 2017-04-05 RX ADMIN — PHENYLEPHRINE HYDROCHLORIDE 100 MCG: 10 INJECTION, SOLUTION INTRAMUSCULAR; INTRAVENOUS; SUBCUTANEOUS at 10:21

## 2017-04-05 RX ADMIN — ACETAMINOPHEN 975 MG: 325 TABLET, FILM COATED ORAL at 08:26

## 2017-04-05 RX ADMIN — LIDOCAINE HYDROCHLORIDE 100 MG: 20 INJECTION, SOLUTION INFILTRATION; PERINEURAL at 09:28

## 2017-04-05 RX ADMIN — PHENYLEPHRINE HYDROCHLORIDE 100 MCG: 10 INJECTION, SOLUTION INTRAMUSCULAR; INTRAVENOUS; SUBCUTANEOUS at 09:51

## 2017-04-05 RX ADMIN — Medication 8 ML/HR: at 10:39

## 2017-04-05 RX ADMIN — HYDROMORPHONE HYDROCHLORIDE: 10 INJECTION, SOLUTION INTRAMUSCULAR; INTRAVENOUS; SUBCUTANEOUS at 15:25

## 2017-04-05 RX ADMIN — HYDROMORPHONE HYDROCHLORIDE: 10 INJECTION, SOLUTION INTRAMUSCULAR; INTRAVENOUS; SUBCUTANEOUS at 22:55

## 2017-04-05 RX ADMIN — FENTANYL CITRATE 50 MCG: 50 INJECTION INTRAMUSCULAR; INTRAVENOUS at 14:44

## 2017-04-05 RX ADMIN — DEXAMETHASONE SODIUM PHOSPHATE 4 MG: 4 INJECTION, SOLUTION INTRAMUSCULAR; INTRAVENOUS at 16:05

## 2017-04-05 RX ADMIN — PHENYLEPHRINE HYDROCHLORIDE 50 MCG: 10 INJECTION, SOLUTION INTRAMUSCULAR; INTRAVENOUS; SUBCUTANEOUS at 12:40

## 2017-04-05 RX ADMIN — SUGAMMADEX 200 MG: 100 INJECTION, SOLUTION INTRAVENOUS at 13:49

## 2017-04-05 RX ADMIN — ROCURONIUM BROMIDE 10 MG: 10 INJECTION INTRAVENOUS at 11:54

## 2017-04-05 RX ADMIN — CEFAZOLIN SODIUM 2 G: 2 INJECTION, SOLUTION INTRAVENOUS at 09:40

## 2017-04-05 RX ADMIN — PHENYLEPHRINE HYDROCHLORIDE 100 MCG: 10 INJECTION, SOLUTION INTRAMUSCULAR; INTRAVENOUS; SUBCUTANEOUS at 09:56

## 2017-04-05 RX ADMIN — PROPOFOL 50 MG: 10 INJECTION, EMULSION INTRAVENOUS at 09:28

## 2017-04-05 RX ADMIN — PHENYLEPHRINE HYDROCHLORIDE 100 MCG: 10 INJECTION, SOLUTION INTRAMUSCULAR; INTRAVENOUS; SUBCUTANEOUS at 09:43

## 2017-04-05 RX ADMIN — FENTANYL CITRATE 50 MCG: 50 INJECTION INTRAMUSCULAR; INTRAVENOUS at 14:27

## 2017-04-05 RX ADMIN — FAMOTIDINE 40 MG: 20 TABLET ORAL at 22:01

## 2017-04-05 RX ADMIN — PHENYLEPHRINE HYDROCHLORIDE 0.1 MCG/KG/MIN: 10 INJECTION, SOLUTION INTRAMUSCULAR; INTRAVENOUS; SUBCUTANEOUS at 12:59

## 2017-04-05 RX ADMIN — SCOPALAMINE 1 PATCH: 1 PATCH, EXTENDED RELEASE TRANSDERMAL at 14:15

## 2017-04-05 RX ADMIN — HYDROMORPHONE HYDROCHLORIDE 0.3 MG: 10 INJECTION, SOLUTION INTRAMUSCULAR; INTRAVENOUS; SUBCUTANEOUS at 15:30

## 2017-04-05 RX ADMIN — PHENYLEPHRINE HYDROCHLORIDE 100 MCG: 10 INJECTION, SOLUTION INTRAMUSCULAR; INTRAVENOUS; SUBCUTANEOUS at 09:33

## 2017-04-05 RX ADMIN — PROPOFOL 20 MG: 10 INJECTION, EMULSION INTRAVENOUS at 09:29

## 2017-04-05 RX ADMIN — ALBUMIN (HUMAN): 12.5 SOLUTION INTRAVENOUS at 10:17

## 2017-04-05 RX ADMIN — PROPOFOL 20 MG: 10 INJECTION, EMULSION INTRAVENOUS at 09:33

## 2017-04-05 RX ADMIN — FENTANYL CITRATE 50 MCG: 50 INJECTION, SOLUTION INTRAMUSCULAR; INTRAVENOUS at 08:37

## 2017-04-05 RX ADMIN — PROCHLORPERAZINE EDISYLATE 5 MG: 5 INJECTION INTRAMUSCULAR; INTRAVENOUS at 22:52

## 2017-04-05 RX ADMIN — SODIUM CHLORIDE, POTASSIUM CHLORIDE, SODIUM LACTATE AND CALCIUM CHLORIDE: 600; 310; 30; 20 INJECTION, SOLUTION INTRAVENOUS at 09:15

## 2017-04-05 RX ADMIN — FENTANYL CITRATE 50 MCG: 50 INJECTION, SOLUTION INTRAMUSCULAR; INTRAVENOUS at 09:24

## 2017-04-05 RX ADMIN — ONDANSETRON 4 MG: 2 INJECTION INTRAMUSCULAR; INTRAVENOUS at 21:59

## 2017-04-05 RX ADMIN — HEPARIN SODIUM 5000 UNITS: 5000 INJECTION, SOLUTION INTRAVENOUS; SUBCUTANEOUS at 09:04

## 2017-04-05 RX ADMIN — CEFAZOLIN SODIUM 1 G: 2 INJECTION, SOLUTION INTRAVENOUS at 13:53

## 2017-04-05 RX ADMIN — SUCCINYLCHOLINE CHLORIDE 100 MG: 20 INJECTION, SOLUTION INTRAMUSCULAR; INTRAVENOUS at 09:30

## 2017-04-05 RX ADMIN — SENNOSIDES AND DOCUSATE SODIUM 2 TABLET: 8.6; 5 TABLET ORAL at 22:01

## 2017-04-05 RX ADMIN — INSULIN ASPART 2 UNITS: 100 INJECTION, SOLUTION INTRAVENOUS; SUBCUTANEOUS at 15:06

## 2017-04-05 RX ADMIN — ASPIRIN 81 MG CHEWABLE TABLET 81 MG: 81 TABLET CHEWABLE at 22:01

## 2017-04-05 RX ADMIN — ROCURONIUM BROMIDE 10 MG: 10 INJECTION INTRAVENOUS at 12:30

## 2017-04-05 RX ADMIN — SODIUM CHLORIDE: 9 INJECTION, SOLUTION INTRAVENOUS at 15:35

## 2017-04-05 RX ADMIN — ROCURONIUM BROMIDE 50 MG: 10 INJECTION INTRAVENOUS at 09:41

## 2017-04-05 ASSESSMENT — PAIN DESCRIPTION - DESCRIPTORS: DESCRIPTORS: ACHING;THROBBING

## 2017-04-05 NOTE — PROGRESS NOTES
Paged by RN to PACU due to increased pain at incision site.    Checked PVC site.  Catheter is still at same site as prior to surgery.  Dressing intact and dry.    Increased PVC rate to 12 mL/hr.  Bolused PVC with 10 mL 0.125%Bupivacaine at 1616.    Yuriy Hankins

## 2017-04-05 NOTE — ANESTHESIA POSTPROCEDURE EVALUATION
Patient: Larry Wegener    Procedure(s):  Left Open Partial Nephrectomy Flank Incision, Ultrasound  general with block Latex Safe - Wound Class: I-Clean    Diagnosis:Left Renal Mass   Diagnosis Additional Information: No value filed.    Anesthesia Type:  ETT, General    Note:  Anesthesia Post Evaluation    Last vitals:  Vitals:    04/05/17 1500 04/05/17 1515 04/05/17 1530   BP:      Resp: 20 20 20   Temp:      SpO2: 97% 94% 97%         Electronically Signed By: Florentin Gonzalez MD  April 5, 2017  3:49 PM

## 2017-04-05 NOTE — BRIEF OP NOTE
[facility], [place]    Brief Operative Note    Pre-operative diagnosis: Left Renal Mass   Post-operative diagnosis * No post-op diagnosis entered *  Procedure: Procedure(s):  Left Open Partial Nephrectomy Flank Incision, Ultrasound  general with block Latex Safe - Wound Class: I-Clean  Surgeon: Surgeon(s) and Role:     * Joe Mitchell MD - Primary     * Edwin Lundberg MD - Resident - Assisting  Anesthesia: Combined General with Block   Estimated blood loss: 500 ml  Drains: 19f deena, 16f oliva  Specimens:   ID Type Source Tests Collected by Time Destination   A : Becky Nephric Fat Tissue Other SURGICAL PATHOLOGY EXAM Joe Mitchell MD 4/5/2017 10:48 AM    B : becky tumor fat Tissue Other SURGICAL PATHOLOGY EXAM Joe Mitchell MD 4/5/2017 11:34 AM    C : left kidney mass for bionet Other (specify in comments) Kidney, Left SURGICAL PATHOLOGY EXAM Joe Mitchell MD 4/5/2017 12:33 PM    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  Complications: None.  Implants: None.

## 2017-04-05 NOTE — IP AVS SNAPSHOT
MRN:[id number]                      After Visit Summary   4/5/2017    Larry Wegener    MRN: [id number]           Thank you!     Thank you for choosing [facility] for your care. Our goal is always to provide you with excellent care. Hearing back from our patients is one way we can continue to improve our services. Please take a few minutes to complete the written survey that you may receive in the mail after you visit with us. Thank you!        Patient Information     Date Of Birth          1950        Designated Caregiver       Most Recent Value    Caregiver    Will someone help with your care after discharge? yes    Name of designated caregiver Simin (wife) Miguel BRAN (son)    Phone number of caregiver [phone]    Caregiver address [address] [place] [address]      About your hospital stay     You were admitted on:  April 5, 2017 You last received care in the:  [address] [facility]    You were discharged on:  April 10, 2017        Reason for your hospital stay       You were in the hospital to have surgery on your kidney                  Who to Call     For medical emergencies, please call 911.  For non-urgent questions about your medical care, please call your primary care provider or clinic, [phone]  For questions related to your surgery, please call your surgery clinic        Attending Provider     Provider Specialty    Joe Mitchell MD Urology       Primary Care Provider Office Phone # Fax #    Tino CORNELIA Pate [phone]       [facility] [address] [facility] [address]         When to contact your care team       - Call or return sooner than your regularly scheduled visit if you develop any of the following: fever (greater than 101.5), uncontrolled pain, uncontrolled nausea or vomiting, as well as increased redness, swelling, or drainage from your wound (if present).  Call or return to ER if you have blood in urine that is getting worse  (rather than getting better) or if you are passing clots and unable to urinate                  After Care Instructions     Activity       Activity  - No strenuous exercise for 6 weeks.  - No lifting, pushing, pulling more than 10 pounds for 6 weeks.   - Do not strain with bowel movements.  - Do not drive until you can press the brake pedal quickly and fully without pain.   - Do not operate a motor vehicle while taking narcotic pain medications (if prescribed)            Diet       Resume previous diet upon discharge            Discharge Instructions       Medications  - Take antibiotics as prescribed (if prescribed)  - Transition from narcotic pain medications to tylenol (acetaminophen) as you are able.  Wean yourself off all pain medications as you are able.  - Some pain medications contain both tylenol (acetaminophen) and a narcotic (Norco, vicodin, percocet), do not take more than 4,000mg of Tylenol (acetaminophen) from all sources in any 24 hour period.  - Narcotics can make you constipated.  Take over the counter fiber (metamucil or benefiber) and stool softeners (miralax, docusate or senna) while taking narcotic pain medications, but stop if you develop diarrhea.  - No driving or operating machinery while taking narcotic pain medications            Monitor and record       Drain output, record daily amount in milliliters            Tubes and drains       You are going home with the following tubes or drains: RONNY.  Tube cares per hospital or home care instructions  - Arrange to have drain removed at clinic near home once daily outputs are less than 150 ml per day            Wound care and dressings       Incisions (if present)  - You may shower and get incisions wet starting 48 hrs after surgery.  - Do not scrub incisions or submerge wounds for 2 weeks or until seen in follow-up.   - Remove wound dressing 48 hours after surgery.   - If purple dermabond glue was used, avoid applying any lotions or ointments.   -  "If steri-strips were used, they will fall off on their own.   - Leave incision open to air. Cover with gauze only if needed for comfort or to protect clothing from drainage.   - The stitches do not need to be removed, they will dissolve on their own.                  Follow-up Appointments     Adult [facility] Follow-up and recommended labs and tests       Follow-up:  -Return to clinic to see Dr. Mitchell on 4/21/17 as previously arranged.  Contact our clinic using the numbers below if you have questions about this.    Phone numbers:   - Monday through Friday 8am to 4:30pm: Call [phone] with questions or to schedule or confirm appointment.    - Nights or weekends: call the after hours emergency pager - [phone] and tell the  \"I would like to page the Urology Resident on call.\"  - For emergencies, call 911                  Your next 10 appointments already scheduled     Apr 21, 2017  1:00 PM CDT   (Arrive by 12:45 PM)   Post-Op with Joe Mitchell MD   [facility] ([facility])    [address] [place] [address] [id number] [phone]              Pending Results     Date and Time Order Name Status Description    4/5/2017 1048 Surgical pathology exam Preliminary             Statement of Approval     Ordered          04/10/17 1221  I have reviewed and agree with all the recommendations and orders detailed in this document.  EFFECTIVE NOW     Approved and electronically signed by:  Conrado Willis MD             Admission Information     Date & Time Provider Department Dept. Phone    4/5/2017 Joe Mitchell MD [address] [facility] [place] [phone]      Your Vitals Were     Blood Pressure Pulse Temperature Respirations Height Weight    130/71 (BP Location: Right arm) 62 98.1  F (36.7  C) (Oral) 18 1.753 m (5' 9\") 107.5 kg (236 lb 14.4 oz)    Pulse Oximetry BMI (Body Mass Index)                96% 34.98 " kg/m2          [facility] Information     [facility] gives you secure access to your electronic health record. If you see a primary care provider, you can also send messages to your care team and make appointments. If you have questions, please call your primary care clinic.  If you do not have a primary care provider, please call [phone] and they will assist you.        Care EveryWhere ID     This is your Care EveryWhere ID. This could be used by other organizations to access your [facility] medical records  [id number]           Review of your medicines      START taking        Dose / Directions    HYDROcodone-acetaminophen 5-325 MG per tablet   Commonly known as:  NORCO        Dose:  1-2 tablet   Take 1-2 tablets by mouth every 4 hours as needed for moderate to severe pain   Quantity:  30 tablet   Refills:  0       order for DME        Equipment being ordered: Walker () Treatment Diagnosis: Limited mobility   Quantity:  1 each   Refills:  0       senna-docusate 8.6-50 MG per tablet   Commonly known as:  SENOKOT-S;PERICOLACE        Dose:  1-2 tablet   Take 1-2 tablets by mouth 2 times daily   Quantity:  100 tablet   Refills:  0         CONTINUE these medicines which have NOT CHANGED        Dose / Directions    ACETAMINOPHEN PO        Dose:  500-1000 mg   Take 500-1,000 mg by mouth every 8 hours as needed for pain   Refills:  0       aspirin 325 MG tablet        March 22, 2017 - Patient has been holding this medication in preparation for surgery.  Not taken since December 2016.  Plans to resume after surgery in April, will continue to hold for now.  Previously was on 325 mg by mouth daily.   Refills:  0       atenolol 50 MG tablet   Commonly known as:  TENORMIN        Dose:  50 mg   Take 50 mg by mouth every morning   Refills:  0       chlorthalidone 25 MG tablet   Commonly known as:  HYGROTON        Dose:  25 mg   Take 25 mg by mouth every morning   Refills:  0       famotidine 40 MG tablet   Commonly known as:   PEPCID        Dose:  40 mg   Take 40 mg by mouth 2 times daily   Refills:  0       FISH OIL PO        March 22, 2017 - Patient has been holding this medication in preparation for surgery.  Not taken since December 2016.  Plans to resume after surgery in April, will continue to hold for now.  Previously was on fish oil 1000 mg by mouth twice daily.   Refills:  0       insulin aspart 100 UNIT/ML injection   Commonly known as:  NovoLOG PEN        Inject Subcutaneous 4 times daily (with meals and nightly) Takes base of 12 units +SSI at breakfast  Takes base of 12 units +SSI at lunch Takes base of 14 units +SSI at supper Takes base of 12 units +SSI at bedtime   Refills:  0       insulin glargine 100 UNIT/ML injection   Commonly known as:  LANTUS        Dose:  45 Units   Inject 45 Units Subcutaneous At Bedtime   Refills:  0       isosorbide mononitrate 60 MG 24 hr tablet   Commonly known as:  IMDUR        Dose:  60 mg   Take 60 mg by mouth every morning   Refills:  0       losartan 25 MG tablet   Commonly known as:  COZAAR        Dose:  25 mg   Take 25 mg by mouth every morning   Refills:  0       metFORMIN 500 MG tablet   Commonly known as:  GLUCOPHAGE        Dose:  1000 mg   Take 1,000 mg by mouth 2 times daily (with meals)   Refills:  0       MULTIVITAMIN ADULT Tabs        Dose:  1 tablet   Take 1 tablet by mouth every evening   Refills:  0       NITROGLYCERIN SL        Dose:  0.4 mg   Place 0.4 mg under the tongue every 5 minutes as needed for chest pain   Refills:  0       oxymetazoline 0.05 % spray   Commonly known as:  AFRIN        Dose:  2 spray   Spray 2 sprays into both nostrils 2 times daily as needed for congestion   Refills:  0       potassium gluconate 2.5 MEQ Tabs tablet        Dose:  2.5 mEq   Take 2.5 mEq by mouth every morning   Refills:  0       simvastatin 40 MG tablet   Commonly known as:  ZOCOR        Dose:  40 mg   Take 40 mg by mouth At Bedtime   Refills:  0            Where to get your medicines       These medications were sent to [facility] - [place], [place] - [address]  [address] [facility] [address]     Phone:  [phone]     senna-docusate 8.6-50 MG per tablet         Some of these will need a paper prescription and others can be bought over the counter. Ask your nurse if you have questions.     Bring a paper prescription for each of these medications     HYDROcodone-acetaminophen 5-325 MG per tablet    order for DME                Protect others around you: Learn how to safely use, store and throw away your medicines at www.disposemymeds.org.             Medication List: This is a list of all your medications and when to take them. Check marks below indicate your daily home schedule. Keep this list as a reference.      Medications           Morning Afternoon Evening Bedtime As Needed    ACETAMINOPHEN PO   Take 500-1,000 mg by mouth every 8 hours as needed for pain   Last time this was given:  650 mg on 4/10/2017  4:55 AM                                aspirin 325 MG tablet   March 22, 2017 - Patient has been holding this medication in preparation for surgery.  Not taken since December 2016.  Plans to resume after surgery in April, will continue to hold for now.  Previously was on 325 mg by mouth daily.                                atenolol 50 MG tablet   Commonly known as:  TENORMIN   Take 50 mg by mouth every morning   Last time this was given:  50 mg on 4/10/2017  7:42 AM                                chlorthalidone 25 MG tablet   Commonly known as:  HYGROTON   Take 25 mg by mouth every morning                                famotidine 40 MG tablet   Commonly known as:  PEPCID   Take 40 mg by mouth 2 times daily   Last time this was given:  40 mg on 4/10/2017  7:42 AM                                FISH OIL PO   March 22, 2017 - Patient has been holding this medication in preparation for surgery.  Not taken since December 2016.  Plans to resume after  surgery in April, will continue to hold for now.  Previously was on fish oil 1000 mg by mouth twice daily.                                HYDROcodone-acetaminophen 5-325 MG per tablet   Commonly known as:  NORCO   Take 1-2 tablets by mouth every 4 hours as needed for moderate to severe pain   Last time this was given:  1 tablet on 4/10/2017 12:16 AM                                insulin aspart 100 UNIT/ML injection   Commonly known as:  NovoLOG PEN   Inject Subcutaneous 4 times daily (with meals and nightly) Takes base of 12 units +SSI at breakfast  Takes base of 12 units +SSI at lunch Takes base of 14 units +SSI at supper Takes base of 12 units +SSI at bedtime   Last time this was given:  1 Units on 4/9/2017  5:07 PM                                insulin glargine 100 UNIT/ML injection   Commonly known as:  LANTUS   Inject 45 Units Subcutaneous At Bedtime   Last time this was given:  45 Units on 4/10/2017  7:43 AM                                isosorbide mononitrate 60 MG 24 hr tablet   Commonly known as:  IMDUR   Take 60 mg by mouth every morning   Last time this was given:  60 mg on 4/10/2017  7:42 AM                                losartan 25 MG tablet   Commonly known as:  COZAAR   Take 25 mg by mouth every morning                                metFORMIN 500 MG tablet   Commonly known as:  GLUCOPHAGE   Take 1,000 mg by mouth 2 times daily (with meals)                                MULTIVITAMIN ADULT Tabs   Take 1 tablet by mouth every evening                                NITROGLYCERIN SL   Place 0.4 mg under the tongue every 5 minutes as needed for chest pain                                order for DME   Equipment being ordered: Walker () Treatment Diagnosis: Limited mobility                                oxymetazoline 0.05 % spray   Commonly known as:  AFRIN   Spray 2 sprays into both nostrils 2 times daily as needed for congestion                                potassium gluconate 2.5 MEQ Tabs  tablet   Take 2.5 mEq by mouth every morning                                senna-docusate 8.6-50 MG per tablet   Commonly known as:  SENOKOT-S;PERICOLACE   Take 1-2 tablets by mouth 2 times daily   Last time this was given:  2 tablets on 4/10/2017  7:42 AM                                simvastatin 40 MG tablet   Commonly known as:  ZOCOR   Take 40 mg by mouth At Bedtime   Last time this was given:  40 mg on 4/9/2017  9:35 PM

## 2017-04-05 NOTE — ANESTHESIA PROCEDURE NOTES
Peripheral Nerve Block Procedure Note    Staff:     Anesthesiologist:  ZAHRAA BANDA    Resident/CRNA:  ALEXA LUEVANO    Block performed by resident/CRNA in the presence of a teaching physician      Referred By:  RYAN OLIVER  Location: Pre-op  Procedure Start/Stop TImes:      4/5/2017 8:35 AM     4/5/2017 8:55 AM    patient identified, IV checked, site marked, risks and benefits discussed, informed consent, monitors and equipment checked, pre-op evaluation, at physician/surgeon's request and post-op pain management      Correct Patient: Yes      Correct Position: Yes      Correct Site: Yes      Correct Procedure: Yes      Correct Laterality:  Yes    Site Marked:  Yes  Procedure details:     Procedure:  Paravertebral    Laterality:  Left    Position:  Right Lateral Decubitus    Sterile Prep: chloraprep, patient draped, mask and sterile gloves      Local skin infiltration:  1% lidocaine    amount (mL):  3    Insertion Site:  T9-10    Needle:  Touhy needle    Needle gauge:  17    Needle length (inches):  3.5    Catheter gauge:  19    Catheter threaded easily: Yes      Threaded to cm at skin:  11    Ultrasound: Yes      Ultrasound used to identify targeted nerve, plexus, or vascular structure and placed a needle adjacent to it      Permanent Image entered into patiient's record      Abnormal pain on injection: No      Blood Aspirated: No      Paresthesias:  No    Bleeding at site: No      Test dose (mL):  3    Test dose local:   Lidocaine 1.5% w/ 1:200,000 epinephrine    Test dose time:  08:50    Test dose negative for signs of intravascular injection: Yes      Bolus via:  Catheter    Infusion Method:  Continuous Infusion    Blood aspirated via catheter: No      Secured:  Dermabond and Tegaderm    Complications:  None

## 2017-04-05 NOTE — IP AVS SNAPSHOT
[address] [address]    Phone:  [phone]                                       After Visit Summary   4/5/2017    Larry Wegener    MRN: [id number]           After Visit Summary Signature Page     I have received my discharge instructions, and my questions have been answered. I have discussed any challenges I see with this plan with the nurse or doctor.    ..........................................................................................................................................  Patient/Patient Representative Signature      ..........................................................................................................................................  Patient Representative Print Name and Relationship to Patient    ..................................................               ................................................  Date                                            Time    ..........................................................................................................................................  Reviewed by Signature/Title    ...................................................              ..............................................  Date                                                            Time

## 2017-04-05 NOTE — OP NOTE
PREOPERATIVE DIAGNOSIS: Left Renal Mass  POSTOPERATIVE DIAGNOSIS: Same  PROCEDURE PERFORMED:    Open Partial Nephrectomy    Ultrasound of kidney  STAFF SURGEON: Joe Mitchell MD   RESIDENT SURGEON: Edwin Lundberg MD  ANESTHESIA: General  ESTIMATED BLOOD LOSS: 500 mL.   DRAINS AND TUBES:     Carvajal catheter    Jae drain in retroperitoneum  COMPLICATIONS: None.   SPECIMENS OBTAINED: Left renal mass kidney.    FINDINGS:     Renal mass in the mid pole of the kidney    Renal ultrasound showed the mass with no additional pathology found    Kidney mass without gross spread of disease.  Frzoen sections negative from tumor base    Renal mass removed with grossly negative margins.    HISTORY OF PRESENT ILLNESS:  Larry Wegener  is a 67 year old male with a renal mass.   After understanding the risks, benefits and alternatives, patient elected to undergo open nephrectomy.    DETAILS OF PROCEDURE  The patient was brought to the OR and general anesthesia was induced with successful intubation.  The patient was placed in flank position.  All pressure points were padded and the patient was secured.  Timeout was taken and prep and drape was done in the standard fashion.    Incision was made and cautery was used to enter the retroperitoneum.  A retractor was placed and the kidney was mobilized using blunt and sharp dissection.  The ureter was identified and preserved.  The upper pole was freed and the adrenal preserved.  We then turned our focus to the vessels.  We dissected out the renal artery and vein.  Number of arteries: 2  Number of veins: 1    We then cleaned fat from the kidney and identified the mass.    Renal ultrasound was done to identify the mass, it margins, and any additional tumors.  We then irrigated the wound.  There was no sign of pneumothorax.    We then began the partial nephrectomy.  The arteries and vein were clamped with a vascular clamp.  We then resected the mass.  Our plane of dissection was near  the tumor capsule.  The mass was removed and we sent several frozen sections from the base, which were negative.  The base was oversewn with absorbable V Lock suture.  We then did early unclamping and there was good hemostasis, total clamp time was 8min.  Floseal was placed and then secured with 0 vicryl sutures with weck clips.      Clamp time was 8 minutes.    We then placed a 19 deena drain and closed in 2 fascial layers using #1 PDS.  The skin was closed using staples.    The patient was taken to recovery in stable condition    PLAN    Admit to the leo

## 2017-04-05 NOTE — ANESTHESIA PROCEDURE NOTES
Arterial Line Procedure Note  Staff:     Anesthesiologist:  OXANA WELSH  Location: In OR Before Induction  Procedure Start/Stop Times:      4/5/2017 9:21 AM     4/5/2017 9:27 AM    patient identified, risks and benefits discussed, informed consent and monitors and equipment checked      Correct Patient: Yes      Correct Position: Yes      Correct Site: Yes      Correct Procedure: Yes      Correct Laterality:  N/A    Site Marked:  No  Line Placement:     Procedure:  Arterial Line    Insertion Site:  Radial    Insertion laterality:  Left    Skin Prep: Chloraprep      Patient Prep: patient draped, mask, sterile gloves, hat and hand hygiene      Local skin infiltration:  2% lidocaine    amount (mL):  2    Ultrasound Guided?: No      Catheter size:  20 gauge, 12 cm    Cath secured with: suture      Dressing:  Tegaderm    Complications:  None obvious    Arterial waveform: Yes      IBP within 10% of NIBP: Yes    Assessment/Narrative:      A line placed pre induction because of known low EF and ischemia.  Sterile technique without difficulty.  Patient tolerated without complaint.  Good waveform and good capillary refill after placement.  Sterile dressing applied.

## 2017-04-05 NOTE — OR NURSING
1530 Pain service here, ropivicaine infusion increased to 12 cc/hr.  1610 9approx. Pain service her to re-bolus catheter.  Patient states pain level at surgical site has decreased, now rates pain as moderate, about 5/10.

## 2017-04-05 NOTE — ANESTHESIA CARE TRANSFER NOTE
Patient: Larry Wegener    Procedure(s):  Left Open Partial Nephrectomy Flank Incision, Ultrasound  general with block Latex Safe - Wound Class: I-Clean    Diagnosis: Left Renal Mass   Diagnosis Additional Information: No value filed.    Anesthesia Type:   ETT, General     Note:  Airway :Nasal Cannula  Patient transferred to:PACU  Comments: VSS, patent airway, report to RN.      Vitals: (Last set prior to Anesthesia Care Transfer)    CRNA VITALS  4/5/2017 1334 - 4/5/2017 1411      4/5/2017             Pulse: 75    SpO2: 92 %    Resp Rate (set): 10                Electronically Signed By: RAFAEL Trinh CRNA  April 5, 2017  2:11 PM

## 2017-04-05 NOTE — LETTER
"Transition Communication Hand-off for Care Transitions to Next Level of Care Provider    Name: Larry Wegener  MRN #: [id number]  Primary Care Provider: Tino Pate     Primary Clinic: [facility] [address] [facility] [address]     Reason for Hospitalization:  Left Renal Mass   Renal cancer (H)  Admit Date/Time: 4/5/2017  5:50 AM  Discharge Date: 4.10.  Payor Source: Payor: MEDICA / Plan: MEDICA PRIME SOLUTION / Product Type: Indemnity /            Reason for Communication Hand-off Referral: Other see dx    Discharge Plan:       Concern for non-adherence with plan of care:   Y/N N  Discharge Needs Assessment:  Needs       Most Recent Value    Equipment Currently Used at Home none    Transportation Available family or friend will provide, car            Follow-up specialty is recommended: Yes    Follow-up plan:  Future Appointments  Date Time Provider Department Center   4/21/2017 1:00 PM Joe Mitchell MD [facility]       Any outstanding tests or procedures:              Key Recommendations:    Adult [facility] Follow-up and recommended labs and tests       Follow-up:   -Return to clinic to see Dr. Mitchell on 4/21/17 as previously arranged.  Contact our clinic using the numbers below if you have questions about this.     Phone numbers:   - Monday through Friday 8am to 4:30pm: Call [phone] with questions or to schedule or confirm appointment.     - Nights or weekends: call the after hours emergency pager - [phone] and tell the  \"I would like to page the Urology Resident on call.\"   - For emergencies, call 911                Jayshree Finch    AVS/Discharge Summary is the source of truth; this is a helpful guide for improved communication of patient story          "

## 2017-04-05 NOTE — ANESTHESIA POSTPROCEDURE EVALUATION
Patient: Larry Wegener    Procedure(s):  Left Open Partial Nephrectomy Flank Incision, Ultrasound  general with block Latex Safe - Wound Class: I-Clean    Diagnosis:Left Renal Mass   Diagnosis Additional Information: No value filed.    Anesthesia Type:  ETT, General    Note:  Anesthesia Post Evaluation    Patient location during evaluation: PACU  Patient participation: Able to fully participate in evaluation  Level of consciousness: awake and alert  Pain management: adequate  Airway patency: patent  Cardiovascular status: acceptable  Respiratory status: acceptable  Hydration status: acceptable  PONV: controlled       Comments: Hemodynamically stable.  Insulin 2 units for .  Troponin pending.  Patient to go to telemetry because of cardiac history.  Service is aware of extensive cardiac history.    Florentin Gonzalez MD        Last vitals:  Vitals:    04/05/17 1430 04/05/17 1445 04/05/17 1500   BP:  137/71    Resp: 12 12 12   Temp:  36.5  C (97.7  F)    SpO2: 94% 97% 97%         Electronically Signed By: Florentin Gonzalez MD  April 5, 2017  3:18 PM

## 2017-04-06 LAB
ANION GAP SERPL CALCULATED.3IONS-SCNC: 11 MMOL/L (ref 3–14)
BUN SERPL-MCNC: 39 MG/DL (ref 7–30)
CALCIUM SERPL-MCNC: 7.4 MG/DL (ref 8.5–10.1)
CHLORIDE SERPL-SCNC: 96 MMOL/L (ref 94–109)
CO2 SERPL-SCNC: 26 MMOL/L (ref 20–32)
CREAT SERPL-MCNC: 1.71 MG/DL (ref 0.66–1.25)
ERYTHROCYTE [DISTWIDTH] IN BLOOD BY AUTOMATED COUNT: 13.7 % (ref 10–15)
GFR SERPL CREATININE-BSD FRML MDRD: 40 ML/MIN/1.7M2
GLUCOSE BLDC GLUCOMTR-MCNC: 240 MG/DL (ref 70–99)
GLUCOSE BLDC GLUCOMTR-MCNC: 242 MG/DL (ref 70–99)
GLUCOSE BLDC GLUCOMTR-MCNC: 281 MG/DL (ref 70–99)
GLUCOSE BLDC GLUCOMTR-MCNC: 287 MG/DL (ref 70–99)
GLUCOSE BLDC GLUCOMTR-MCNC: 330 MG/DL (ref 70–99)
GLUCOSE SERPL-MCNC: 280 MG/DL (ref 70–99)
HCT VFR BLD AUTO: 32.6 % (ref 40–53)
HGB BLD-MCNC: 10.8 G/DL (ref 13.3–17.7)
MCH RBC QN AUTO: 28.6 PG (ref 26.5–33)
MCHC RBC AUTO-ENTMCNC: 33.1 G/DL (ref 31.5–36.5)
MCV RBC AUTO: 86 FL (ref 78–100)
PLATELET # BLD AUTO: 175 10E9/L (ref 150–450)
POTASSIUM SERPL-SCNC: 4 MMOL/L (ref 3.4–5.3)
RBC # BLD AUTO: 3.78 10E12/L (ref 4.4–5.9)
SODIUM SERPL-SCNC: 133 MMOL/L (ref 133–144)
TROPONIN I SERPL-MCNC: 0.02 UG/L (ref 0–0.04)
WBC # BLD AUTO: 10.1 10E9/L (ref 4–11)

## 2017-04-06 PROCEDURE — 25000132 ZZH RX MED GY IP 250 OP 250 PS 637: Mod: GY | Performed by: UROLOGY

## 2017-04-06 PROCEDURE — 25000131 ZZH RX MED GY IP 250 OP 636 PS 637: Mod: GY | Performed by: PHYSICIAN ASSISTANT

## 2017-04-06 PROCEDURE — 25000128 H RX IP 250 OP 636: Performed by: UROLOGY

## 2017-04-06 PROCEDURE — 36415 COLL VENOUS BLD VENIPUNCTURE: CPT | Performed by: UROLOGY

## 2017-04-06 PROCEDURE — A9270 NON-COVERED ITEM OR SERVICE: HCPCS | Mod: GY | Performed by: UROLOGY

## 2017-04-06 PROCEDURE — 12000006 ZZH R&B IMCU INTERMEDIATE UMMC

## 2017-04-06 PROCEDURE — 25000125 ZZHC RX 250: Performed by: PHYSICIAN ASSISTANT

## 2017-04-06 PROCEDURE — 85027 COMPLETE CBC AUTOMATED: CPT | Performed by: UROLOGY

## 2017-04-06 PROCEDURE — 25000125 ZZHC RX 250: Performed by: UROLOGY

## 2017-04-06 PROCEDURE — 84484 ASSAY OF TROPONIN QUANT: CPT | Performed by: UROLOGY

## 2017-04-06 PROCEDURE — 00000146 ZZHCL STATISTIC GLUCOSE BY METER IP

## 2017-04-06 PROCEDURE — 80048 BASIC METABOLIC PNL TOTAL CA: CPT | Performed by: UROLOGY

## 2017-04-06 PROCEDURE — 93005 ELECTROCARDIOGRAM TRACING: CPT

## 2017-04-06 PROCEDURE — 93010 ELECTROCARDIOGRAM REPORT: CPT | Performed by: INTERNAL MEDICINE

## 2017-04-06 RX ORDER — SODIUM CHLORIDE AND POTASSIUM CHLORIDE 150; 450 MG/100ML; MG/100ML
INJECTION, SOLUTION INTRAVENOUS CONTINUOUS
Status: DISCONTINUED | OUTPATIENT
Start: 2017-04-06 | End: 2017-04-08

## 2017-04-06 RX ORDER — DEXTROSE MONOHYDRATE, SODIUM CHLORIDE, AND POTASSIUM CHLORIDE 50; 1.49; 4.5 G/1000ML; G/1000ML; G/1000ML
INJECTION, SOLUTION INTRAVENOUS CONTINUOUS
Status: DISCONTINUED | OUTPATIENT
Start: 2017-04-06 | End: 2017-04-06 | Stop reason: ALTCHOICE

## 2017-04-06 RX ADMIN — ACETAMINOPHEN 975 MG: 325 TABLET, FILM COATED ORAL at 15:30

## 2017-04-06 RX ADMIN — ACETAMINOPHEN 975 MG: 325 TABLET, FILM COATED ORAL at 22:01

## 2017-04-06 RX ADMIN — ACETAMINOPHEN 975 MG: 325 TABLET, FILM COATED ORAL at 06:32

## 2017-04-06 RX ADMIN — SIMVASTATIN 40 MG: 40 TABLET, FILM COATED ORAL at 21:59

## 2017-04-06 RX ADMIN — ASPIRIN 81 MG CHEWABLE TABLET 81 MG: 81 TABLET CHEWABLE at 08:06

## 2017-04-06 RX ADMIN — FAMOTIDINE 40 MG: 20 TABLET ORAL at 19:27

## 2017-04-06 RX ADMIN — INSULIN GLARGINE 34 UNITS: 100 INJECTION, SOLUTION SUBCUTANEOUS at 11:57

## 2017-04-06 RX ADMIN — HYDROMORPHONE HYDROCHLORIDE: 10 INJECTION, SOLUTION INTRAMUSCULAR; INTRAVENOUS; SUBCUTANEOUS at 22:00

## 2017-04-06 RX ADMIN — GABAPENTIN 100 MG: 100 CAPSULE ORAL at 15:31

## 2017-04-06 RX ADMIN — POTASSIUM CHLORIDE AND SODIUM CHLORIDE: 450; 150 INJECTION, SOLUTION INTRAVENOUS at 09:15

## 2017-04-06 RX ADMIN — SENNOSIDES AND DOCUSATE SODIUM 2 TABLET: 8.6; 5 TABLET ORAL at 19:27

## 2017-04-06 RX ADMIN — ONDANSETRON 4 MG: 4 TABLET, ORALLY DISINTEGRATING ORAL at 08:06

## 2017-04-06 RX ADMIN — Medication: at 00:46

## 2017-04-06 RX ADMIN — GABAPENTIN 100 MG: 100 CAPSULE ORAL at 19:27

## 2017-04-06 RX ADMIN — HEPARIN SODIUM 5000 UNITS: 5000 INJECTION, SOLUTION INTRAVENOUS; SUBCUTANEOUS at 08:10

## 2017-04-06 RX ADMIN — ATENOLOL 50 MG: 25 TABLET ORAL at 08:06

## 2017-04-06 RX ADMIN — HEPARIN SODIUM 5000 UNITS: 5000 INJECTION, SOLUTION INTRAVENOUS; SUBCUTANEOUS at 15:31

## 2017-04-06 RX ADMIN — FAMOTIDINE 40 MG: 20 TABLET ORAL at 08:06

## 2017-04-06 RX ADMIN — ISOSORBIDE MONONITRATE 60 MG: 60 TABLET, EXTENDED RELEASE ORAL at 08:06

## 2017-04-06 RX ADMIN — HYDROMORPHONE HYDROCHLORIDE: 10 INJECTION, SOLUTION INTRAMUSCULAR; INTRAVENOUS; SUBCUTANEOUS at 06:29

## 2017-04-06 RX ADMIN — GABAPENTIN 100 MG: 100 CAPSULE ORAL at 08:06

## 2017-04-06 RX ADMIN — SENNOSIDES AND DOCUSATE SODIUM 2 TABLET: 8.6; 5 TABLET ORAL at 08:06

## 2017-04-06 ASSESSMENT — ACTIVITIES OF DAILY LIVING (ADL)
RETIRED_EATING: 0-->INDEPENDENT
RETIRED_COMMUNICATION: 0-->UNDERSTANDS/COMMUNICATES WITHOUT DIFFICULTY
SWALLOWING: 0-->SWALLOWS FOODS/LIQUIDS WITHOUT DIFFICULTY
TRANSFERRING: 0-->INDEPENDENT
COGNITION: 0 - NO COGNITION ISSUES REPORTED
BATHING: 0-->INDEPENDENT
DRESS: 0-->INDEPENDENT
FALL_HISTORY_WITHIN_LAST_SIX_MONTHS: NO
TOILETING: 0-->INDEPENDENT
AMBULATION: 0-->INDEPENDENT

## 2017-04-06 ASSESSMENT — PAIN DESCRIPTION - DESCRIPTORS
DESCRIPTORS: ACHING;THROBBING
DESCRIPTORS: ACHING
DESCRIPTORS: ACHING
DESCRIPTORS: ACHING;THROBBING

## 2017-04-06 NOTE — PROGRESS NOTES
REGIONAL ANESTHESIA PAIN SERVICE CONTINUOUS NERVE INFUSION NOTE  SUBJECTIVE:  Interval History: Pt reports good pain control via continuous peripheral nerve block (CPNB) infusion and PCA HYDROmorphone.  Denies any weakness, paresthesias, circumoral numbness, metallic taste or tinnitus.  Pt is ambulating with assistance.  Patient is currently without nausea or vomiting. Patient is tolerating a diet.       Clinically Aligned Pain Assessment (CAPA):   Comfort (How is your pain?): Comfortably manageable  Change in Pain (Since your last medication/intervention?): About the same  Pain Control (How are your pain treatments working?):  Fully effective pain control  Functioning (Are you able to do activities to get better?) : Can do most things, but pain gets in the way of some   Sleep (Does your pain management allow you to sleep or rest?): Normal sleep     Numerical Rating Scale:  2/10 at rest and 3/10 with movement.        Anticoagulation:      heparin sodium PF injection 5,000 Units 5,000 Units, SC, Q8H Ordered         OBJECTIVE:    Diagnostic:  Lab Results   Component Value Date    WBC 10.1 04/06/2017     Lab Results   Component Value Date    RBC 3.78 04/06/2017     Lab Results   Component Value Date    HGB 10.8 04/06/2017     Lab Results   Component Value Date    HCT 32.6 04/06/2017     Lab Results   Component Value Date     04/06/2017         Vitals:    Temp:  [97.6  F (36.4  C)-98.7  F (37.1  C)] 98.7  F (37.1  C)  Pulse:  [67] 67  Heart Rate:  [52-74] 62  Resp:  [12-20] 18  BP: (120-165)/() 125/73  MAP:  [69 mmHg-100 mmHg] 79 mmHg  Arterial Line BP: ()/(49-71) 111/49  SpO2:  [94 %-99 %] 97 %    Exam:    Strength 5/5 and symmetric grossly in bilateral LE      L) paravertebral (PV) catheter site with dressing c/d/i, no tenderness, erythema, heme, edema      ASSESSMENT/PLAN:    Larry Wegener is a 67 year old male POD #1 s/p NEPHRECTOMY PARTIAL  NEPHRECTOMY and placement of L) T9-10 PV catheter for  analgesia.  Pt is receiving adequate analgesia with Ropivacaine 0.2%, total infusion 12mL/hour.  Pt is ambulating without difficulty.  No weakness or paresthesias.  No evidence of adverse side effects associated with local anesthetic.  Pt is using PCA HYDROmorphone PRN.    - continue current total infusion of 12mL/hour  - will continue to follow and adjust as needed  - expected change of next On-Q pump is 2 days  - discussed plan with attending anesthesiologist    RAFAEL Mims CNP  Regional Anesthesia Pain Service  4/6/2017 7:34 AM    24 hour Job Code Pager.  For in-house use only.     [place]:  * * [phone]  [place]: * * [phone]  Peds: * * [phone]  Enter call-back number and #      This pager only accepts text messages through [facility]

## 2017-04-06 NOTE — PLAN OF CARE
Problem: Goal Outcome Summary  Goal: Goal Outcome Summary  Outcome: No Change  Pt arrived from PACU, pt AOx4, VSS, afebrile, on 4 L oxiplus, sats > 95%. capnography monitor on, IPI 10, CO2 38. Pt c/o incisional pain and nausea, On-Q bulb, dilaudid pca and prn zofran. RONNY drain intact. Carvajal with bloody drainage. 2 person skin assessment completed with Aidee CRAIG. Blood sugars Q 4 hrs. Clear liquid diet. Family updated at bedside. Will continue to monitor.

## 2017-04-06 NOTE — ANESTHESIA POST-OP FOLLOW-UP NOTE
REGIONAL ANESTHESIA PAIN SERVICE CONTINUOUS NERVE INFUSION NOTE  SUBJECTIVE:  Interval History: Pt reports good pain control via continuous peripheral nerve block (CPNB) infusion and PCA HYDROmorphone. Denies any weakness, paresthesias, circumoral numbness, metallic taste or tinnitus. Pt is ambulating with assistance. Patient is currently without nausea or vomiting. Patient is tolerating a diet.      Clinically Aligned Pain Assessment (CAPA):   Comfort (How is your pain?): Comfortably manageable  Change in Pain (Since your last medication/intervention?): About the same  Pain Control (How are your pain treatments working?): Fully effective pain control  Functioning (Are you able to do activities to get better?) : Can do most things, but pain gets in the way of some   Sleep (Does your pain management allow you to sleep or rest?): Normal sleep     Numerical Rating Scale:  2/10 at rest and 3/10 with movement.         Anticoagulation:      heparin sodium PF injection 5,000 Units 5,000 Units, SC, Q8H Ordered            OBJECTIVE:     Diagnostic:        Lab Results   Component Value Date     WBC 10.1 04/06/2017            Lab Results   Component Value Date     RBC 3.78 04/06/2017            Lab Results   Component Value Date     HGB 10.8 04/06/2017            Lab Results   Component Value Date     HCT 32.6 04/06/2017            Lab Results   Component Value Date      04/06/2017            Vitals:    Temp: [97.6  F (36.4  C)-98.7  F (37.1  C)] 98.7  F (37.1  C)  Pulse: [67] 67  Heart Rate: [52-74] 62  Resp: [12-20] 18  BP: (120-165)/() 125/73  MAP: [69 mmHg-100 mmHg] 79 mmHg  Arterial Line BP: ()/(49-71) 111/49  SpO2: [94 %-99 %] 97 %     Exam:   Strength 5/5 and symmetric grossly in bilateral LE      L) paravertebral (PV) catheter site with dressing c/d/i, no tenderness, erythema, heme, edema        ASSESSMENT/PLAN:   Larry Wegener is a 67 year old male POD #1 s/p NEPHRECTOMY PARTIAL  NEPHRECTOMY and  placement of L) T9-10 PV catheter for analgesia. Pt is receiving adequate analgesia with Ropivacaine 0.2%, total infusion 12mL/hour. Pt is ambulating without difficulty. No weakness or paresthesias. No evidence of adverse side effects associated with local anesthetic. Pt is using PCA HYDROmorphone PRN.     - continue current total infusion of 12mL/hour  - will continue to follow and adjust as needed  - expected change of next On-Q pump is 2 days    Edwin Mercado DO    [facility]  Pain Management  Department of Anesthesiology

## 2017-04-06 NOTE — ADDENDUM NOTE
Addendum  created 04/06/17 1532 by Edwin Mercado, DO    Cosign clinical note with attestation, Sign clinical note

## 2017-04-06 NOTE — PLAN OF CARE
Problem: Goal Outcome Summary  Goal: Goal Outcome Summary  Outcome: Improving  Neuro: A&Ox4.   Cardiac: SR- sinus jim with HR 50-70s. BP stable.  Respiratory: Sating 99% on 4L oxi plus. Using IS.  GI/: Lou colored urine output via oliva, with 120-200mL/Q4hrs. Bowel sounds present, no gas yet. Nausea controlled with zofran and compazine.   Diet/appetite: Tolerating clears diet, drinking a lot of fluids. BG achs +2am, with bg upper 200s.  Activity:  Assist of 1, needs to get out of bed still.  Pain: PCA pump .1mg Q10 minutes, with good pain control.  Skin: L lower abdominal incision with DB. L grey with sang. Output.     R: Continue with POC. Notify primary team with changes. Plan to wean O2. Tx to lower level of care. Advance diet as tolerated. Encourage ambulation.

## 2017-04-06 NOTE — PROGRESS NOTES
"ADDENDUM 11:10am:  Reviewed EKG with Cardiology Fellow on call.  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  \"No new changes.\"  Furthermore patient tells me his nausea (the symptom that prompted the EKG) has resolved.  Troponin is normal.   Bonny Mckeon PA-C      Urology Daily Progress Note    24 hour events/Subjective:     - No acute events overnight   - Pain well controlled on current regimen   - Tolerating clear liquid diet ; mild nausea, no vomiting   - Not yet passing flatus.   - Not yet ambulating.    O:  Vitals: /73 (BP Location: Right arm)  Pulse 67  Temp 98.7  F (37.1  C) (Axillary)  Resp 18  Ht 1.753 m (5' 9\")  Wt 108.9 kg (240 lb 1.6 oz)  SpO2 97%  BMI 35.46 kg/m2  General: Alert, interactive, in NAD  Resp: Non-labored breathing on RA  Abdomen: Soft, appropriately-tender, non distended. Incision(s) c/d/i w/ dermabond; no erythema or drainage.   Ext: Warm and well perfused   Carvajal/Urostomy: Clear yellow   Drain: Serosanguinous    I/O last 3 completed shifts:  In: 2870 [P.O.:150; I.V.:1870; IV Piggyback:100]  Out: 1155 [Urine:360; Drains:295; Blood:500] - Last 24 hours    Labs/Imaging  Heme:  Recent Labs  Lab 04/05/17  1827 04/05/17  1430 04/05/17  1344 04/05/17  1327 04/05/17  1103   WBC  --  9.2  --   --   --    HGB  --  11.3* 11.0* 11.7* 12.0*   * 149*  --   --   --      Chem:  Recent Labs  Lab 04/05/17  1430 04/05/17  1344 04/05/17  1327 04/05/17  1103 04/05/17  0649   POTASSIUM 3.7 3.9 3.8 3.5 3.4   CR 1.15  --   --   --  1.10       Assessment/Plan  67 year old y/o male POD#1  s/p open left partial nephrectomy for renal mass. Doing well    NEURO Pain well controlled on On-Q pump, Dilaudid PCA, PRN oxy and APAP. Changes:  None    CV HDS. Will check EKG this AM for chest pain equivalent (nausea)  Recheck trop with AM labs   PULM Aggressive pulmonary toilet and I/S.   FEN/GI mIVF @ 75 ml/hr. Advance diet as tolerated    DIRK Carvajal to remain until ambulating    HEME " Hgb as above, postop anemia expected for this operation.  Continue to monitor. No transfusions indicated at this time   ID Afebrile, no leukocytosis.    Antibiotics: Completed periop antibiotics    ENDO No issues   ACTIVITY Up as tolerated  Ambulate at least TID    PPx Heparin 5000 U TID   DISPO 6B, anticipated d/c to home once meeting criteria         Seen and examined with chief resident, to be discussed with Dr. Mitchell    --    Conrado Willis MD  Urology Resident           Contacting the Urology Team     Please use the following job codes to reach the Urology Team. Note that you must use an in house phone and that job codes cannot receive text pages.     On weekdays, dial [phone] (or star-star-star 777 on the new [employer] telephones) then 0817 to reach the Adult Urology resident or PA on call    On weekdays, dial [phone] (or star-star-star 777 on the new [employer] telephones) then 0818 to reach the Pediatric Urology resident    On weeknights and weekends, dial [phone] (or star-star-star 777 on the new [employer] telephones) then [phone] to reach the Urology resident on call (for both Adult and Pediatrics)

## 2017-04-07 LAB
ANION GAP SERPL CALCULATED.3IONS-SCNC: 8 MMOL/L (ref 3–14)
BUN SERPL-MCNC: 40 MG/DL (ref 7–30)
CALCIUM SERPL-MCNC: 7.7 MG/DL (ref 8.5–10.1)
CHLORIDE SERPL-SCNC: 94 MMOL/L (ref 94–109)
CO2 SERPL-SCNC: 28 MMOL/L (ref 20–32)
CREAT FLD-MCNC: 2.6 MG/DL
CREAT SERPL-MCNC: 1.75 MG/DL (ref 0.66–1.25)
ERYTHROCYTE [DISTWIDTH] IN BLOOD BY AUTOMATED COUNT: 13.7 % (ref 10–15)
GFR SERPL CREATININE-BSD FRML MDRD: 39 ML/MIN/1.7M2
GLUCOSE BLDC GLUCOMTR-MCNC: 168 MG/DL (ref 70–99)
GLUCOSE BLDC GLUCOMTR-MCNC: 183 MG/DL (ref 70–99)
GLUCOSE BLDC GLUCOMTR-MCNC: 192 MG/DL (ref 70–99)
GLUCOSE BLDC GLUCOMTR-MCNC: 220 MG/DL (ref 70–99)
GLUCOSE BLDC GLUCOMTR-MCNC: 258 MG/DL (ref 70–99)
GLUCOSE SERPL-MCNC: 166 MG/DL (ref 70–99)
HCT VFR BLD AUTO: 29.5 % (ref 40–53)
HGB BLD-MCNC: 9.9 G/DL (ref 13.3–17.7)
INTERPRETATION ECG - MUSE: NORMAL
MCH RBC QN AUTO: 28.9 PG (ref 26.5–33)
MCHC RBC AUTO-ENTMCNC: 33.6 G/DL (ref 31.5–36.5)
MCV RBC AUTO: 86 FL (ref 78–100)
PLATELET # BLD AUTO: 152 10E9/L (ref 150–450)
POTASSIUM SERPL-SCNC: 3.9 MMOL/L (ref 3.4–5.3)
RBC # BLD AUTO: 3.42 10E12/L (ref 4.4–5.9)
SODIUM SERPL-SCNC: 131 MMOL/L (ref 133–144)
SPECIMEN SOURCE FLD: NORMAL
WBC # BLD AUTO: 10.2 10E9/L (ref 4–11)

## 2017-04-07 PROCEDURE — 25000132 ZZH RX MED GY IP 250 OP 250 PS 637: Mod: GY | Performed by: UROLOGY

## 2017-04-07 PROCEDURE — 85027 COMPLETE CBC AUTOMATED: CPT | Performed by: UROLOGY

## 2017-04-07 PROCEDURE — 82570 ASSAY OF URINE CREATININE: CPT | Performed by: STUDENT IN AN ORGANIZED HEALTH CARE EDUCATION/TRAINING PROGRAM

## 2017-04-07 PROCEDURE — 25000128 H RX IP 250 OP 636: Performed by: UROLOGY

## 2017-04-07 PROCEDURE — 80048 BASIC METABOLIC PNL TOTAL CA: CPT | Performed by: UROLOGY

## 2017-04-07 PROCEDURE — 00000146 ZZHCL STATISTIC GLUCOSE BY METER IP

## 2017-04-07 PROCEDURE — 12000006 ZZH R&B IMCU INTERMEDIATE UMMC

## 2017-04-07 PROCEDURE — A9270 NON-COVERED ITEM OR SERVICE: HCPCS | Mod: GY | Performed by: UROLOGY

## 2017-04-07 PROCEDURE — 25000125 ZZHC RX 250: Performed by: PHYSICIAN ASSISTANT

## 2017-04-07 PROCEDURE — 36415 COLL VENOUS BLD VENIPUNCTURE: CPT | Performed by: UROLOGY

## 2017-04-07 RX ORDER — BISACODYL 10 MG
10 SUPPOSITORY, RECTAL RECTAL ONCE
Status: COMPLETED | OUTPATIENT
Start: 2017-04-07 | End: 2017-04-07

## 2017-04-07 RX ADMIN — ASPIRIN 81 MG CHEWABLE TABLET 81 MG: 81 TABLET CHEWABLE at 09:26

## 2017-04-07 RX ADMIN — ACETAMINOPHEN 975 MG: 325 TABLET, FILM COATED ORAL at 14:27

## 2017-04-07 RX ADMIN — HYDROMORPHONE HYDROCHLORIDE: 10 INJECTION, SOLUTION INTRAMUSCULAR; INTRAVENOUS; SUBCUTANEOUS at 05:19

## 2017-04-07 RX ADMIN — Medication: at 00:01

## 2017-04-07 RX ADMIN — ATENOLOL 50 MG: 25 TABLET ORAL at 09:16

## 2017-04-07 RX ADMIN — SENNOSIDES AND DOCUSATE SODIUM 2 TABLET: 8.6; 5 TABLET ORAL at 19:38

## 2017-04-07 RX ADMIN — HYDROMORPHONE HYDROCHLORIDE: 10 INJECTION, SOLUTION INTRAMUSCULAR; INTRAVENOUS; SUBCUTANEOUS at 22:14

## 2017-04-07 RX ADMIN — FAMOTIDINE 40 MG: 20 TABLET ORAL at 09:17

## 2017-04-07 RX ADMIN — SENNOSIDES AND DOCUSATE SODIUM 2 TABLET: 8.6; 5 TABLET ORAL at 09:16

## 2017-04-07 RX ADMIN — GABAPENTIN 100 MG: 100 CAPSULE ORAL at 09:16

## 2017-04-07 RX ADMIN — HEPARIN SODIUM 5000 UNITS: 5000 INJECTION, SOLUTION INTRAVENOUS; SUBCUTANEOUS at 00:00

## 2017-04-07 RX ADMIN — SIMVASTATIN 40 MG: 40 TABLET, FILM COATED ORAL at 22:09

## 2017-04-07 RX ADMIN — GABAPENTIN 100 MG: 100 CAPSULE ORAL at 14:29

## 2017-04-07 RX ADMIN — HEPARIN SODIUM 5000 UNITS: 5000 INJECTION, SOLUTION INTRAVENOUS; SUBCUTANEOUS at 17:16

## 2017-04-07 RX ADMIN — ACETAMINOPHEN 975 MG: 325 TABLET, FILM COATED ORAL at 06:54

## 2017-04-07 RX ADMIN — FAMOTIDINE 40 MG: 20 TABLET ORAL at 19:38

## 2017-04-07 RX ADMIN — ISOSORBIDE MONONITRATE 60 MG: 60 TABLET, EXTENDED RELEASE ORAL at 09:17

## 2017-04-07 RX ADMIN — POTASSIUM CHLORIDE AND SODIUM CHLORIDE: 450; 150 INJECTION, SOLUTION INTRAVENOUS at 12:59

## 2017-04-07 RX ADMIN — BISACODYL 10 MG: 10 SUPPOSITORY RECTAL at 12:29

## 2017-04-07 RX ADMIN — GABAPENTIN 100 MG: 100 CAPSULE ORAL at 19:38

## 2017-04-07 RX ADMIN — HEPARIN SODIUM 5000 UNITS: 5000 INJECTION, SOLUTION INTRAVENOUS; SUBCUTANEOUS at 09:16

## 2017-04-07 RX ADMIN — ONDANSETRON 4 MG: 2 INJECTION INTRAMUSCULAR; INTRAVENOUS at 05:36

## 2017-04-07 ASSESSMENT — PAIN DESCRIPTION - DESCRIPTORS
DESCRIPTORS: ACHING
DESCRIPTORS: ACHING;CONSTANT

## 2017-04-07 NOTE — PLAN OF CARE
Problem: Goal Outcome Summary  Goal: Goal Outcome Summary  Outcome: Improving  Pt AOx4, VSS, afebrile on RA while awake, 2 L NC when sleeping, sats > 95%. C/o incisional pain relieved with dilaudid PCA. Denies nausea, prn zofran given x 1 this AM, tolerating full liquid diet. Up to chair, ambulated in room with walker and assist of 2, pt c/o of generalized weakness. Carvajal and RONNY intact. Plan to transfer to  when bed available. Will continue to monitor.

## 2017-04-07 NOTE — PROGRESS NOTES
"ADDENDUM    Urology Daily Progress Note    24 hour events/Subjective:     - No acute events overnight   - Pain well controlled on current regimen   - Tolerating diet ; no nausea, no vomiting   -No flatus       O:  Vitals: /76 (BP Location: Right arm)  Pulse 67  Temp 98.5  F (36.9  C) (Oral)  Resp 20  Ht 1.753 m (5' 9\")  Wt 109 kg (240 lb 3.2 oz)  SpO2 95%  BMI 35.47 kg/m2  General: Alert, interactive, in NAD  Resp: Non-labored breathing on RA  Abdomen: Soft, appropriately-tender, non distended. Incision(s) c/d/i w/ dermabond; no erythema or drainage.   Ext: Warm and well perfused   Carvajal/Urostomy: Clear yellow   Drain: Serosanguinous    I/O last 3 completed shifts:  In: 2466.25 [P.O.:860; I.V.:1606.25]  Out: 1265 [Urine:995; Drains:270] - Last 24 hours    Labs/Imaging  Heme:    Recent Labs  Lab 04/07/17  0518 04/06/17  0655 04/05/17  1827 04/05/17  1430 04/05/17  1344   WBC 10.2 10.1  --  9.2  --    HGB 9.9* 10.8*  --  11.3* 11.0*    175 148* 149*  --      Chem:  Recent Labs  Lab 04/07/17  0518 04/06/17  0655 04/05/17  1430 04/05/17  1344  04/05/17  0649   POTASSIUM 3.9 4.0 3.7 3.9  < > 3.4   CR 1.75* 1.71* 1.15  --   --  1.10   < > = values in this interval not displayed.    Assessment/Plan  67 year old y/o male POD#2  s/p open left partial nephrectomy for renal mass. Doing well    NEURO Pain well controlled on On-Q pump, Dilaudid PCA, PRN oxy and APAP. Changes:  None    CV HDS.    PULM Aggressive pulmonary toilet and I/S.   FEN/GI mIVF. Continue current diet    Carvajal to remain pending RONNY creatinine    HEME Hgb as above, postop anemia expected for this operation.  Continue to monitor. No transfusions indicated at this time   ID Afebrile, no leukocytosis.    Antibiotics: Completed periop antibiotics    ENDO No issues   ACTIVITY Up as tolerated  PT/OT   PPx Heparin 5000 U TID   DISPO Transfer to /C         Seen and examined with chief resident, to be discussed with Dr. Mitchell    --    A De " MD Dale  Urology Resident           Contacting the Urology Team     Please use the following job codes to reach the Urology Team. Note that you must use an in house phone and that job codes cannot receive text pages.     On weekdays, dial [phone] (or star-star-star 777 on the new [employer] telephones) then 0817 to reach the Adult Urology resident or PA on call    On weekdays, dial [phone] (or star-star-star 777 on the new [employer] telephones) then 0818 to reach the Pediatric Urology resident    On weeknights and weekends, dial [phone] (or star-star-star 777 on the new [employer] telephones) then 0039 to reach the Urology resident on call (for both Adult and Pediatrics)

## 2017-04-07 NOTE — PLAN OF CARE
Problem: Goal Outcome Summary  Goal: Goal Outcome Summary  Outcome: No Change  Neuro: A&Ox4, no deficit noted.   Cardiac: SR- S1 and S2 audible   Respiratory: Sating at 94% on 3L of 02 NC .  GI/: oliva catheter in place- adequate urine output.  No bowel movement since surgery-abdomen is distended and has sluggish bowel sound   Diet/appetite: Full liquid diet. Poor appetite refused breakfast   Activity:  Assist of 1, up to chair and in halls.  Pain: At acceptable level on current regimen- Pain pump effective    Skin: Surgical site is proximal -scan drainage noted, jeromy drainage noted to have a leak other wise skin is Intact, no new deficits noted.     R:  Urology Team were notified of patient not having bowel movement since surgery; a suppository was ordered not effective yet. Pt. Has a PCA-pump at 0.1mg/hr. Will  Continue with POC. Notify primary team with changes.

## 2017-04-07 NOTE — PLAN OF CARE
Problem: Goal Outcome Summary  Goal: Goal Outcome Summary  Outcome: Improving  Neuro: A&Ox4.   Cardiac: SR with HR 70-80s. BP stable.  Respiratory: Sating 99% on 2L O2 at night. Can take off O2 when awake. HX VICTOR M. D/c'd capnography. Using IS.  GI/: Lou colored urine output via oliva, with 250-350mL/Q4hrs. Bowel sounds present, no gas yet. Nausea controlled with zofran.  Diet/appetite: Tolerating full liquids diet, drinking a lot of fluids. Won't advance diet until passing gas.  BG achs +2am, with bg mid 200s.  Activity: Assist of 1, ambulated twice & sat in chair twice this shift.  Pain: PCA pump .1mg Q10 minutes, with good pain control.  Skin: L lower abdominal incision with DB, scant sang. Drainage. L ronny with sang. Output, will check for urine leak today. If no leak, d/c oliva. RONNY has scant leakage around incision site.      R: Continue with POC. Notify primary team with changes. Plan to wean O2. Tx to lower level of care- but needs tele.  Encourage ambulation.

## 2017-04-07 NOTE — PROGRESS NOTES
Care Coordinator Progress Note     Admission Date/Time:  4/5/2017  Attending MD:  Joe Mitchell MD     Data  Chart reviewed, discussed with interdisciplinary team.   Patient was admitted for: Partial nephrectomy    Concerns with insurance coverage for discharge needs: None identified  Current Living Situation: Patient lives with Wife  Support System: Wife, Cheryle is Primary Caregiver  Services Involved: none  Transportation: Wife  Barriers to Discharge: None anticipated          Assessment  Pt s/p open partial nephrectomy due to left renal mass on 4/5/17. I have met with Pt and his Wife Cheryle to introduce myself and care coordinator role. Prior to admission Pt was independent living with his Wife in [facility]. Pt states his pain is being well managed and Urology Team is keeping him updated regarding medical status and POC.  Pt anticipates remaining hospitalized through the weekend. Pts Wife and Son will be available to assist 24/7 as needed.   Pt currently has no outstanding questions or concerns at this time.     Plan  Anticipated Discharge Date:  3-4 days  Anticipated Discharge Plan:  D/C to home with assist from Wife and Son    Neli Robb RN   6B care coordinator

## 2017-04-07 NOTE — ANESTHESIA POST-OP FOLLOW-UP NOTE
REGIONAL ANESTHESIA PAIN SERVICE CONTINUOUS NERVE INFUSION NOTE  SUBJECTIVE:  Interval History: Pt reports adequate pain control via continuous peripheral nerve block (CPNB) infusion.  Denies any weakness, paresthesias, circumoral numbness, metallic taste or tinnitus.  Pt is ambulating with assistance.  Patient is currently without nausea or vomiting. Patient is tolerating a liquid diet.       Clinically Aligned Pain Assessment (CAPA):   Comfort (How is your pain?): Comfortably manageable  Change in Pain (Since your last medication/intervention?): Getting better  Pain Control (How are your pain treatments working?):  Fully effective pain control  Functioning (Are you able to do activities to get better?) : Can do most things, but pain gets in the way of some   Sleep (Does your pain management allow you to sleep or rest?): Normal sleep     Numerical Rating Scale:  0/10 at rest and 2/10 with movement.        Anticoagulation:  Heparin 5000U q8hrs      OBJECTIVE:    Diagnostic:  Lab Results   Component Value Date    WBC 10.2 04/07/2017     Lab Results   Component Value Date    RBC 3.42 04/07/2017     Lab Results   Component Value Date    HGB 9.9 04/07/2017     Lab Results   Component Value Date    HCT 29.5 04/07/2017     Lab Results   Component Value Date     04/07/2017         Vitals:    Temp:  [36.7  C (98.1  F)-37.5  C (99.5  F)] 37.2  C (99  F)  Pulse:  [71] 71  Heart Rate:  [60-87] 60  Resp:  [18-20] 18  BP: (103-133)/(68-83) 103/68  SpO2:  [92 %-98 %] 98 %    Exam:    Strength 5/5 and symmetric grossly in bilateral LE      Left paravertebral (PV) catheter site with dressing c/d/i, no tenderness, erythema, heme, edema      ASSESSMENT/PLAN:    Larry Wegener is a 67 year old male POD #2 s/p NEPHRECTOMY PARTIAL;NEPHRECTOMY and placement of left T9/10 PV catheter for analgesia.  Pt is receiving adequate analgesia with Ropivacaine 0.2%, total infusion 12mL/hour.  Pt is ambulating without difficulty.  No weakness  or paresthesias, adequate sensory block.  no evidence of adverse side effects associated with local anesthetic.     - continue current total infusion of 12mL/hour  - will continue to follow and adjust as needed  - expected change of next On-Q pump is 1 day    Edwin Mercado DO  Regional Anesthesia Pain Service  4/7/2017 3:25 PM    24 hour Job Code Pager.  For in-house use only.     [place]:  * * [phone]  [place]: * * [phone]  Peds: * * [phone]  Enter call-back number and #      This pager only accepts text messages through [facility]

## 2017-04-07 NOTE — PROVIDER NOTIFICATION
FYI: if u want pt to transfer, 7th floor doesn't do tele- if you want tele, needs to stay on 6B or 6c (transfer wont likely happen). Otherwise 5A and 5B have tele, but they are medical floors. thx

## 2017-04-08 ENCOUNTER — APPOINTMENT (OUTPATIENT)
Dept: OCCUPATIONAL THERAPY | Facility: CLINIC | Age: 67
DRG: 657 | End: 2017-04-08
Attending: UROLOGY
Payer: MEDICARE

## 2017-04-08 LAB
ANION GAP SERPL CALCULATED.3IONS-SCNC: 7 MMOL/L (ref 3–14)
BUN SERPL-MCNC: 34 MG/DL (ref 7–30)
CALCIUM SERPL-MCNC: 7.6 MG/DL (ref 8.5–10.1)
CHLORIDE SERPL-SCNC: 94 MMOL/L (ref 94–109)
CO2 SERPL-SCNC: 27 MMOL/L (ref 20–32)
CREAT SERPL-MCNC: 1.73 MG/DL (ref 0.66–1.25)
ERYTHROCYTE [DISTWIDTH] IN BLOOD BY AUTOMATED COUNT: 13.7 % (ref 10–15)
GFR SERPL CREATININE-BSD FRML MDRD: 39 ML/MIN/1.7M2
GLUCOSE BLDC GLUCOMTR-MCNC: 179 MG/DL (ref 70–99)
GLUCOSE BLDC GLUCOMTR-MCNC: 220 MG/DL (ref 70–99)
GLUCOSE BLDC GLUCOMTR-MCNC: 222 MG/DL (ref 70–99)
GLUCOSE BLDC GLUCOMTR-MCNC: 234 MG/DL (ref 70–99)
GLUCOSE BLDC GLUCOMTR-MCNC: 268 MG/DL (ref 70–99)
GLUCOSE SERPL-MCNC: 195 MG/DL (ref 70–99)
HCT VFR BLD AUTO: 26.5 % (ref 40–53)
HGB BLD-MCNC: 8.7 G/DL (ref 13.3–17.7)
MCH RBC QN AUTO: 28.4 PG (ref 26.5–33)
MCHC RBC AUTO-ENTMCNC: 32.8 G/DL (ref 31.5–36.5)
MCV RBC AUTO: 87 FL (ref 78–100)
PLATELET # BLD AUTO: 136 10E9/L (ref 150–450)
POTASSIUM SERPL-SCNC: 4 MMOL/L (ref 3.4–5.3)
RBC # BLD AUTO: 3.06 10E12/L (ref 4.4–5.9)
SODIUM SERPL-SCNC: 128 MMOL/L (ref 133–144)
WBC # BLD AUTO: 8.2 10E9/L (ref 4–11)

## 2017-04-08 PROCEDURE — 80048 BASIC METABOLIC PNL TOTAL CA: CPT | Performed by: STUDENT IN AN ORGANIZED HEALTH CARE EDUCATION/TRAINING PROGRAM

## 2017-04-08 PROCEDURE — 97535 SELF CARE MNGMENT TRAINING: CPT | Mod: GO | Performed by: OCCUPATIONAL THERAPIST

## 2017-04-08 PROCEDURE — A9270 NON-COVERED ITEM OR SERVICE: HCPCS | Mod: GY | Performed by: UROLOGY

## 2017-04-08 PROCEDURE — 25000132 ZZH RX MED GY IP 250 OP 250 PS 637: Mod: GY | Performed by: UROLOGY

## 2017-04-08 PROCEDURE — 97530 THERAPEUTIC ACTIVITIES: CPT | Mod: GO | Performed by: OCCUPATIONAL THERAPIST

## 2017-04-08 PROCEDURE — 27110038 ZZH RX 271: Performed by: ANESTHESIOLOGY

## 2017-04-08 PROCEDURE — 25000125 ZZHC RX 250: Performed by: ANESTHESIOLOGY

## 2017-04-08 PROCEDURE — 12000025 ZZH R&B TRANSPLANT INTERMEDIATE

## 2017-04-08 PROCEDURE — 97165 OT EVAL LOW COMPLEX 30 MIN: CPT | Mod: GO | Performed by: OCCUPATIONAL THERAPIST

## 2017-04-08 PROCEDURE — 00000146 ZZHCL STATISTIC GLUCOSE BY METER IP

## 2017-04-08 PROCEDURE — 25800025 ZZH RX 258: Performed by: UROLOGY

## 2017-04-08 PROCEDURE — 25000128 H RX IP 250 OP 636: Performed by: UROLOGY

## 2017-04-08 PROCEDURE — 25000125 ZZHC RX 250: Performed by: PHYSICIAN ASSISTANT

## 2017-04-08 PROCEDURE — 85027 COMPLETE CBC AUTOMATED: CPT | Performed by: STUDENT IN AN ORGANIZED HEALTH CARE EDUCATION/TRAINING PROGRAM

## 2017-04-08 PROCEDURE — 36415 COLL VENOUS BLD VENIPUNCTURE: CPT | Performed by: STUDENT IN AN ORGANIZED HEALTH CARE EDUCATION/TRAINING PROGRAM

## 2017-04-08 PROCEDURE — 40000133 ZZH STATISTIC OT WARD VISIT: Performed by: OCCUPATIONAL THERAPIST

## 2017-04-08 PROCEDURE — S5010 5% DEXTROSE AND 0.45% SALINE: HCPCS | Performed by: UROLOGY

## 2017-04-08 RX ORDER — OXYCODONE AND ACETAMINOPHEN 5; 325 MG/1; MG/1
1 TABLET ORAL EVERY 4 HOURS PRN
Status: DISCONTINUED | OUTPATIENT
Start: 2017-04-08 | End: 2017-04-09

## 2017-04-08 RX ORDER — OXYCODONE HYDROCHLORIDE 5 MG/1
5-10 TABLET ORAL EVERY 4 HOURS PRN
Status: DISCONTINUED | OUTPATIENT
Start: 2017-04-08 | End: 2017-04-08

## 2017-04-08 RX ORDER — HYDROMORPHONE HCL/0.9% NACL/PF 0.2MG/0.2
.2-.4 SYRINGE (ML) INTRAVENOUS
Status: DISCONTINUED | OUTPATIENT
Start: 2017-04-08 | End: 2017-04-10 | Stop reason: HOSPADM

## 2017-04-08 RX ADMIN — ATENOLOL 50 MG: 25 TABLET ORAL at 08:32

## 2017-04-08 RX ADMIN — DEXTROSE AND SODIUM CHLORIDE: 5; 450 INJECTION, SOLUTION INTRAVENOUS at 14:00

## 2017-04-08 RX ADMIN — HEPARIN SODIUM 5000 UNITS: 5000 INJECTION, SOLUTION INTRAVENOUS; SUBCUTANEOUS at 08:32

## 2017-04-08 RX ADMIN — HEPARIN SODIUM 5000 UNITS: 5000 INJECTION, SOLUTION INTRAVENOUS; SUBCUTANEOUS at 00:08

## 2017-04-08 RX ADMIN — ACETAMINOPHEN 975 MG: 325 TABLET, FILM COATED ORAL at 00:03

## 2017-04-08 RX ADMIN — SIMVASTATIN 40 MG: 40 TABLET, FILM COATED ORAL at 21:53

## 2017-04-08 RX ADMIN — Medication: at 03:57

## 2017-04-08 RX ADMIN — ISOSORBIDE MONONITRATE 60 MG: 60 TABLET, EXTENDED RELEASE ORAL at 08:32

## 2017-04-08 RX ADMIN — GABAPENTIN 100 MG: 100 CAPSULE ORAL at 08:32

## 2017-04-08 RX ADMIN — GABAPENTIN 100 MG: 100 CAPSULE ORAL at 14:31

## 2017-04-08 RX ADMIN — ASPIRIN 81 MG CHEWABLE TABLET 81 MG: 81 TABLET CHEWABLE at 08:32

## 2017-04-08 RX ADMIN — FAMOTIDINE 40 MG: 20 TABLET ORAL at 08:33

## 2017-04-08 RX ADMIN — POTASSIUM CHLORIDE AND SODIUM CHLORIDE: 450; 150 INJECTION, SOLUTION INTRAVENOUS at 03:58

## 2017-04-08 RX ADMIN — SENNOSIDES AND DOCUSATE SODIUM 2 TABLET: 8.6; 5 TABLET ORAL at 08:32

## 2017-04-08 RX ADMIN — HEPARIN SODIUM 5000 UNITS: 5000 INJECTION, SOLUTION INTRAVENOUS; SUBCUTANEOUS at 15:33

## 2017-04-08 RX ADMIN — HEPARIN SODIUM 5000 UNITS: 5000 INJECTION, SOLUTION INTRAVENOUS; SUBCUTANEOUS at 23:16

## 2017-04-08 RX ADMIN — FAMOTIDINE 40 MG: 20 TABLET ORAL at 20:49

## 2017-04-08 RX ADMIN — ACETAMINOPHEN 975 MG: 325 TABLET, FILM COATED ORAL at 08:32

## 2017-04-08 ASSESSMENT — PAIN DESCRIPTION - DESCRIPTORS
DESCRIPTORS: ACHING
DESCRIPTORS: SORE

## 2017-04-08 ASSESSMENT — ACTIVITIES OF DAILY LIVING (ADL): PREVIOUS_RESPONSIBILITIES: MEAL PREP;HOUSEKEEPING;LAUNDRY;SHOPPING;YARDWORK;MEDICATION MANAGEMENT;FINANCES;DRIVING;WORK

## 2017-04-08 NOTE — PLAN OF CARE
Problem: Goal Outcome Summary  Goal: Goal Outcome Summary  OT 6B: Evaluation completed & treatment initiated. Pt tolerating hallway ambulation with FWW & CGA; limited by post op deconditioning and incisional pain/precautions. Pt motivated, will likely progress quickly. No specific PT needs identified at this time, OT only to follow     REC: Home with family assist prn (wife & son)

## 2017-04-08 NOTE — PLAN OF CARE
"Problem: Goal Outcome Summary  Goal: Goal Outcome Summary  Outcome: Improving  /60 (BP Location: Right arm)  Pulse 71  Temp 98.7  F (37.1  C) (Oral)  Resp 16  Ht 1.753 m (5' 9\")  Wt 110.4 kg (243 lb 6.4 oz)  SpO2 94%  BMI 35.94 kg/m2     Neuro: A&Ox4.    Cardiac: SR. VSS. AF.            Respiratory: Sating >90% on RA while awake, did need 1-2L NC while sleeping   GI/: Adequate urine output. Carvajal pulled @ 1000, has not voided since.   Diet/appetite: Tolerating full liquid for breakfast diet. Eating well. Advanced to regular for lunch, did not eat prior to transfer   Activity:  Assist of 1, up to chair and in halls with walker.   Pain: At acceptable level on current regimen. PCA DC'd orals started but none were given. ONQ pump in place @ 12ml/hr   Skin: Intact, no new deficits noted.     R: Continue with POC. Notify primary team with changes.          "

## 2017-04-08 NOTE — PROGRESS NOTES
TRANSFER  D:  Patient arrived to  from  at 1400 as transfer.  AVSS, reporting minimal to no pain, and blood glucose=222, 268  Patient up with standby assist, ambulated in flower, voiding, passing flatus, reported bowel movement yesterday, and tolerating diet with fair appetite.  Patient has continuous peripheral nerve block infusing at 12ml/hr.  When attempting to use urinal at the bedside, block was unintentionally disconnected, anesthesia was called, here to assess device, and device was then fixed.  Incisions remains CDI.  RONNY to bulb suction with 90ml out.    I:  Scheduled meds given as ordered.   A: Patient stable and resting in bed.  P:  Continue to monitor, treat as ordered, notify MDs with changes.  Possible discharge tomorrow.

## 2017-04-08 NOTE — PROGRESS NOTES
Regional Anesthesia Note 4/8/17:    Notified by nurse that PVC plastic connector became disconnected when patient accidentally pulled off while trying to get out of bed.  Nurse immediately cleaned with chloraprep and secured with tegaderm.  I replaced with a new sterile plastic connector and filter using sterile gloves and mask.  Prior to replacement catheter was cleaned with chloraprep.      Aicha Balderas MD  [place] 2 Anesthesia Resident  Pager [phone]

## 2017-04-08 NOTE — PLAN OF CARE
"Problem: Goal Outcome Summary  Goal: Goal Outcome Summary  /78  Pulse 71  Temp 98.3  F (36.8  C) (Oral)  Resp 18  Ht 1.753 m (5' 9\")  Wt 110.4 kg (243 lb 6.4 oz)  SpO2 98%  BMI 35.94 kg/m2     Alert, oriented x4, VSS, afebrile, sats upper 90s on 3L nasal canula.  Complains of left abdominal pain, especially when coughing.  Dilaudid PCA in use, also scheduled tylenol and OnQ epidural, this is providing adequate pain relief.  Was up to the chair several hours last evening, able to eat half of his dinner.  Belly continues to be distended, but overnight patient was passing gas and was able to have more than one bowel movement.  Carvajal patent with good UOP.  Left RONNY drain still leaking at site, dressing changed x2 since 1600 yesterday.  Moderate sanguinous output from RONNY.  Left flank incision with small serosanguinous drainage, dressing changed x1.  Making needs known, continue with plan of care.      "

## 2017-04-08 NOTE — ADDENDUM NOTE
Addendum  created 04/08/17 1136 by Aicha Balderas MD    Pend clinical note, Sign clinical note

## 2017-04-08 NOTE — PROGRESS NOTES
"REGIONAL ANESTHESIA PAIN SERVICE CONTINUOUS NERVE INFUSION NOTE    SUBJECTIVE:.    Interval History: Pt reports adequate pain control via continuous peripheral nerve block (CPNB) infusion. Denies any weakness, paresthesias, circumoral numbness, metallic taste or tinnitus. Pt is ambulating with assistance. Patient is currently without nausea or vomiting. Patient is tolerating a diet.      Clinically Aligned Pain Assessment (CAPA):   Comfort (How is your pain?): Comfortably manageable  Change in Pain (Since your last medication/intervention?): Getting better  Pain Control (How are your pain treatments working?): Fully effective pain control  Functioning (Are you able to do activities to get better?) : Can do most things, but pain gets in the way of some   Sleep (Does your pain management allow you to sleep or rest?): Normal sleep     Numerical Rating Scale:  2/10 at rest         Anticoagulation: Heparin 5000U q8hrs        OBJECTIVE:     Vital signs:  Temp: 37.1  C (98.7  F) Temp src: Oral BP: 112/60   Heart Rate: 59 Resp: 16 SpO2: 94 % O2 Device: None (Room air) Oxygen Delivery: 3 LPM Height: 175.3 cm (5' 9\") Weight: 110.4 kg (243 lb 6.4 oz) (standing)  Estimated body mass index is 35.94 kg/(m^2) as calculated from the following:    Height as of this encounter: 1.753 m (5' 9\").    Weight as of this encounter: 110.4 kg (243 lb 6.4 oz).    Lab Results   Component Value Date    WBC 8.2 04/08/2017    WBC 10.2 04/07/2017    WBC 10.1 04/06/2017    HGB 8.7 (L) 04/08/2017    HGB 9.9 (L) 04/07/2017    HGB 10.8 (L) 04/06/2017    HCT 26.5 (L) 04/08/2017    HCT 29.5 (L) 04/07/2017    HCT 32.6 (L) 04/06/2017     (L) 04/08/2017     04/07/2017     04/06/2017     (L) 04/08/2017     (L) 04/07/2017     04/06/2017    POTASSIUM 4.0 04/08/2017    POTASSIUM 3.9 04/07/2017    POTASSIUM 4.0 04/06/2017    CHLORIDE 94 04/08/2017    CHLORIDE 94 04/07/2017    CHLORIDE 96 04/06/2017    CO2 27 04/08/2017    " CO2 28 04/07/2017    CO2 26 04/06/2017    BUN 34 (H) 04/08/2017    BUN 40 (H) 04/07/2017    BUN 39 (H) 04/06/2017    CR 1.73 (H) 04/08/2017    CR 1.75 (H) 04/07/2017    CR 1.71 (H) 04/06/2017     (H) 04/08/2017     (H) 04/07/2017     (H) 04/06/2017    NTBNPI 331 04/05/2017    TROPI 0.020 04/06/2017    TROPI 0.016 04/05/2017    AST 28 02/17/2017    ALT 35 02/17/2017    ALKPHOS 83 02/17/2017    BILITOTAL 0.4 02/17/2017    INR 0.99 03/22/2017    INR 0.97 02/17/2017        Exam:   Strength 5/5 and symmetric grossly in bilateral LE      Left paravertebral (PV) catheter site with dressing c/d/i, no tenderness, erythema, heme, edema        ASSESSMENT/PLAN:     Larry Wegener is a 67 year old male POD #3 s/p NEPHRECTOMY PARTIAL;NEPHRECTOMY and placement of left T9/10 PV catheter for analgesia. Pt is receiving adequate analgesia with Ropivacaine 0.2%, total infusion 12mL/hour. Pt is ambulating without difficulty. No weakness or paresthesias, adequate sensory block. No evidence of adverse side effects associated with local anesthetic.      - continue current total infusion of 12mL/hour  - will continue to follow and adjust as needed     Aicha Balderas MD  [place] 2 Anesthesia Resident     24 hour Job Code Pager. For in-house use only.   [place]: * * [phone]  [place] [employer]: * * [phone]  Peds: * * [phone]  Enter call-back number and #   This pager only accepts text messages through [facility]

## 2017-04-08 NOTE — PLAN OF CARE
Problem: Goal Outcome Summary  Goal: Goal Outcome Summary  PT 6B:  Acknowledge PT consult, holding on PT eval for now and OT to follow for discharge needs.  Pt is mobilizing well post-op and is increasing activity tolerance as expected.  Anticipate based upon (I), active lifestyle baseline that pt will progress to (I) mobility without PT intervention, however if pt continues to require FWW use for gait PT will assess formally.

## 2017-04-08 NOTE — PROGRESS NOTES
04/08/17 0855   Quick Adds   Type of Visit Initial Occupational Therapy Evaluation   Living Environment   Lives With spouse   Living Arrangements house   Home Accessibility stairs to enter home;stairs within home   Number of Stairs to Enter Home 3   Number of Stairs Within Home (flight to basement, does not need to use)   Stair Railings at Home inside, present at both sides   Transportation Available family or friend will provide;car   Self-Care   Dominant Hand right   Usual Activity Tolerance good   Current Activity Tolerance moderate   Regular Exercise yes   Activity/Exercise Type walking   Exercise Amount/Frequency 30 mins;5-7 times/wk   Equipment Currently Used at Home none   Activity/Exercise/Self-Care Comment works full time as    Functional Level Prior   Ambulation 0-->independent   Transferring 0-->independent   Toileting 0-->independent   Bathing 0-->independent   Dressing 0-->independent   Eating 0-->independent   Communication 0-->understands/communicates without difficulty   Swallowing 0-->swallows foods/liquids without difficulty   Cognition 0 - no cognition issues reported   Fall history within last six months no   Prior Functional Level Comment I with all I/ADLs   General Information   Onset of Illness/Injury or Date of Surgery - Date 04/05/17   Referring Physician Colby Rivas MD   Patient/Family Goals Statement return home with wife   Additional Occupational Profile Info/Pertinent History of Current Problem 67 year old y/o male POD#3  s/p open left partial nephrectomy for renal mass   Precautions/Limitations abdominal precautions   Cognitive Status Examination   Orientation orientation to person, place and time   Level of Consciousness alert   Able to Follow Commands WNL/WFL   Personal Safety (Cognitive) WNL/WFL   Memory intact   Attention No deficits were identified   Organization/Problem Solving No deficits were identified   Executive Function No deficits were identified   Visual  Perception   Visual Perception Wears glasses   Sensory Examination   Sensory Quick Adds No deficits were identified   Pain Assessment   Patient Currently in Pain No   Posture   Posture not impaired   Range of Motion (ROM)   ROM Quick Adds No deficits were identified   Hand Strength   Hand Strength Comments good grasp   Muscle Tone Assessment   Muscle Tone Quick Adds No deficits were identified   Coordination   Upper Extremity Coordination No deficits were identified   Bed Mobility Skill: Supine to Sit   Level of [place]: Supine/Sit stand-by assist   Physical Assist/Nonphysical Assist: Supine/Sit set-up required;supervision   Assistive Device: Supine/Sit bedrail   Transfer Skill: Bed to Chair/Chair to Bed   Level of [place]: Bed to Chair contact guard   Transfer Skill: Sit to Stand   Level of [place]: Sit/Stand contact guard   Transfer Skill: Toilet Transfer   Level of [place]: Toilet unable to perform   Balance   Balance Quick Add No deficits identified   Balance Comments Pt slow moving, below baseline but no LOB with activity   Bathing   Level of [place] unable to perform   Upper Body Dressing   Level of [place]: Dress Upper Body minimum assist (75% patients effort)   Lower Body Dressing   Level of [place]: Dress Lower Body moderate assist (50% patients effort)   Toileting   Level of [place]: Toilet unable to perform   Eating/Self Feeding   Level of [place]: Eating independent   Instrumental Activities of Daily Living (IADL)   Previous Responsibilities meal prep;housekeeping;laundry;shopping;yardwork;medication management;finances;driving;work   Activities of Daily Living Analysis   Impairments Contributing to Impaired Activities of Daily Living pain;post surgical precautions;strength decreased;flexibility decreased   Clinical Impression   Criteria for Skilled Therapeutic Interventions Met yes, treatment indicated   OT Diagnosis post op weakness   Influenced by the  "following impairments weakness, deconditioning   Assessment of Occupational Performance 1-3 Performance Deficits   Identified Performance Deficits decreased tolerance for dressing, toileting; decreased I with bed mobility 2/2 surgical precations/pain; deconditioned for I/ADLs   Clinical Decision Making (Complexity) Low complexity   Therapy Frequency daily   Predicted Duration of Therapy Intervention (days/wks) 1 week   Anticipated Discharge Disposition Home with Assist   Risks and Benefits of Treatment have been explained. Yes   Patient, Family & other staff in agreement with plan of care Yes   [facility] TM \"6 Clicks\"   2016, Trustees of [employer], under license to [employer].  All rights reserved.   6 Clicks Short Forms Daily Activity Inpatient Short Form   [facility]  \"6 Clicks\" Daily Activity Inpatient Short Form   1. Putting on and taking off regular lower body clothing? 2 - A Lot   2. Bathing (including washing, rinsing, drying)? 2 - A Lot   3. Toileting, which includes using toilet, bedpan or urinal? 3 - A Little   4. Putting on and taking off regular upper body clothing? 4 - None   5. Taking care of personal grooming such as brushing teeth? 4 - None   6. Eating meals? 4 - None   Daily Activity Raw Score (Score out of 24.Lower scores equate to lower levels of function) 19   Total Evaluation Time   Total Evaluation Time (Minutes) 15     "

## 2017-04-08 NOTE — PLAN OF CARE
Transfer  Transferred from: 6B to 7A   Via:wheelchair   Reason for transfer:Pt not appropriate for 6B- improved  patient condition  Family: Aware of transfer, wife traveled with pt to 7   Belongings: Received with pt  Chart: Received with pt  Medications: Meds sent with pt.       Pt status: stable

## 2017-04-08 NOTE — PROGRESS NOTES
"Urology Daily Progress Note    24 hour events/Subjective:     - No acute events overnight   - Pain well controlled on current regimen   - Tolerating diet ; no nausea, no vomiting   - Passing flatus, no BMs yet     O:  Vitals: /83  Pulse 71  Temp 98.1  F (36.7  C) (Oral)  Resp 18  Ht 1.753 m (5' 9\")  Wt 110.4 kg (243 lb 6.4 oz)  SpO2 98%  BMI 35.94 kg/m2  General: Alert, interactive, in NAD  Resp: Non-labored breathing on RA  Abdomen: Soft, non-tender, mild distention. Incisions c/d/i w/ dermabond; no erythema or drainage.   Ext: Warm and well perfused   Oliva/Urostomy: Clear yellow   Drain: Serosanguinous    I/O last 3 completed shifts:  In: 2180 [P.O.:380; I.V.:1800]  Out: 1620 [Urine:1425; Drains:195] - Last 24 hours    Labs/Imaging  Heme:    Recent Labs  Lab 04/08/17  0729 04/07/17  0518 04/06/17  0655 04/05/17  1827 04/05/17  1430   WBC 8.2 10.2 10.1  --  9.2   HGB 8.7* 9.9* 10.8*  --  11.3*   * 152 175 148* 149*     Chem:    Recent Labs  Lab 04/08/17  0729 04/07/17  0518 04/06/17  0655 04/05/17  1430   POTASSIUM 4.0 3.9 4.0 3.7   CR 1.73* 1.75* 1.71* 1.15       Assessment/Plan  67 year old y/o male POD#3  s/p open left partial nephrectomy for renal mass. Doing well    NEURO Transition to PO pain meds today, continue On-Q pumps   CV HDS.    PULM Aggressive pulmonary toilet and I/S.   FEN/GI mIVF. Continue current diet    D/c oliva today; recheck RONNY Cr in AM   HEME Hgb as above, postop anemia expected for this operation.  Continue to monitor. No transfusions indicated at this time   ID Afebrile, no leukocytosis.    Antibiotics: Completed periop antibiotics    ENDO No issues   ACTIVITY Up as tolerated  PT/OT   PPx Heparin 5000 U TID   DISPO Transfer to ; likely d/c tomorrow     Seen and examined with chief resident and Dr Lewis    --  Eliud Gray, PGY-4  Urology Resident    Patient was seen, evaluated and plan was formulated in conjunction with me and I agree with the above.  Charline SUTTON. " MD Debbie   Contacting the Urology Team     Please use the following job codes to reach the Urology Team. Note that you must use an in house phone and that job codes cannot receive text pages.     On weekdays, dial [phone] (or star-star-star [phone] on the new [place] telephones) then [phone] to reach the Adult Urology resident or PA on call    On weekdays, dial [phone] (or star-star-star [phone] on the new [place] telephones) then [phone] to reach the Pediatric Urology resident    On weeknights and weekends, dial [phone] (or star-star-star [phone] on the new [place] telephones) then [phone] to reach the Urology resident on call (for both Adult and Pediatrics)

## 2017-04-09 ENCOUNTER — APPOINTMENT (OUTPATIENT)
Dept: OCCUPATIONAL THERAPY | Facility: CLINIC | Age: 67
DRG: 657 | End: 2017-04-09
Attending: UROLOGY
Payer: MEDICARE

## 2017-04-09 LAB
ANION GAP SERPL CALCULATED.3IONS-SCNC: 8 MMOL/L (ref 3–14)
BLD PROD TYP BPU: NORMAL
BLD PROD TYP BPU: NORMAL
BLD UNIT ID BPU: 0
BLD UNIT ID BPU: 0
BLOOD PRODUCT CODE: NORMAL
BLOOD PRODUCT CODE: NORMAL
BPU ID: NORMAL
BPU ID: NORMAL
BUN SERPL-MCNC: 26 MG/DL (ref 7–30)
CALCIUM SERPL-MCNC: 7.8 MG/DL (ref 8.5–10.1)
CHLORIDE SERPL-SCNC: 95 MMOL/L (ref 94–109)
CO2 SERPL-SCNC: 27 MMOL/L (ref 20–32)
CREAT FLD-MCNC: 40.9 MG/DL
CREAT SERPL-MCNC: 1.45 MG/DL (ref 0.66–1.25)
ERYTHROCYTE [DISTWIDTH] IN BLOOD BY AUTOMATED COUNT: 13.6 % (ref 10–15)
GFR SERPL CREATININE-BSD FRML MDRD: 48 ML/MIN/1.7M2
GLUCOSE BLDC GLUCOMTR-MCNC: 163 MG/DL (ref 70–99)
GLUCOSE BLDC GLUCOMTR-MCNC: 182 MG/DL (ref 70–99)
GLUCOSE BLDC GLUCOMTR-MCNC: 183 MG/DL (ref 70–99)
GLUCOSE BLDC GLUCOMTR-MCNC: 244 MG/DL (ref 70–99)
GLUCOSE SERPL-MCNC: 189 MG/DL (ref 70–99)
HCT VFR BLD AUTO: 25 % (ref 40–53)
HGB BLD-MCNC: 8.4 G/DL (ref 13.3–17.7)
MCH RBC QN AUTO: 28.9 PG (ref 26.5–33)
MCHC RBC AUTO-ENTMCNC: 33.6 G/DL (ref 31.5–36.5)
MCV RBC AUTO: 86 FL (ref 78–100)
PLATELET # BLD AUTO: 176 10E9/L (ref 150–450)
POTASSIUM SERPL-SCNC: 3.4 MMOL/L (ref 3.4–5.3)
RBC # BLD AUTO: 2.91 10E12/L (ref 4.4–5.9)
SODIUM SERPL-SCNC: 130 MMOL/L (ref 133–144)
SPECIMEN SOURCE FLD: NORMAL
TRANSFUSION STATUS PATIENT QL: NORMAL
WBC # BLD AUTO: 6.9 10E9/L (ref 4–11)

## 2017-04-09 PROCEDURE — 25000132 ZZH RX MED GY IP 250 OP 250 PS 637: Mod: GY | Performed by: UROLOGY

## 2017-04-09 PROCEDURE — A9270 NON-COVERED ITEM OR SERVICE: HCPCS | Mod: GY | Performed by: UROLOGY

## 2017-04-09 PROCEDURE — 36415 COLL VENOUS BLD VENIPUNCTURE: CPT | Performed by: UROLOGY

## 2017-04-09 PROCEDURE — 82570 ASSAY OF URINE CREATININE: CPT | Performed by: UROLOGY

## 2017-04-09 PROCEDURE — 40000133 ZZH STATISTIC OT WARD VISIT

## 2017-04-09 PROCEDURE — 25000128 H RX IP 250 OP 636: Performed by: UROLOGY

## 2017-04-09 PROCEDURE — 12000025 ZZH R&B TRANSPLANT INTERMEDIATE

## 2017-04-09 PROCEDURE — 85027 COMPLETE CBC AUTOMATED: CPT | Performed by: UROLOGY

## 2017-04-09 PROCEDURE — 97535 SELF CARE MNGMENT TRAINING: CPT | Mod: GO

## 2017-04-09 PROCEDURE — 80048 BASIC METABOLIC PNL TOTAL CA: CPT | Performed by: UROLOGY

## 2017-04-09 PROCEDURE — 00000146 ZZHCL STATISTIC GLUCOSE BY METER IP

## 2017-04-09 RX ORDER — HYDROCODONE BITARTRATE AND ACETAMINOPHEN 5; 325 MG/1; MG/1
1 TABLET ORAL EVERY 4 HOURS PRN
Status: DISCONTINUED | OUTPATIENT
Start: 2017-04-09 | End: 2017-04-10 | Stop reason: HOSPADM

## 2017-04-09 RX ORDER — CALCIUM CARBONATE 500 MG/1
500 TABLET, CHEWABLE ORAL DAILY PRN
Status: DISCONTINUED | OUTPATIENT
Start: 2017-04-09 | End: 2017-04-10 | Stop reason: HOSPADM

## 2017-04-09 RX ORDER — BISACODYL 10 MG
10 SUPPOSITORY, RECTAL RECTAL ONCE
Status: COMPLETED | OUTPATIENT
Start: 2017-04-09 | End: 2017-04-09

## 2017-04-09 RX ADMIN — ATENOLOL 50 MG: 25 TABLET ORAL at 07:54

## 2017-04-09 RX ADMIN — FAMOTIDINE 40 MG: 20 TABLET ORAL at 19:35

## 2017-04-09 RX ADMIN — FAMOTIDINE 40 MG: 20 TABLET ORAL at 07:54

## 2017-04-09 RX ADMIN — ACETAMINOPHEN 650 MG: 325 TABLET, FILM COATED ORAL at 15:44

## 2017-04-09 RX ADMIN — BISACODYL 10 MG: 10 SUPPOSITORY RECTAL at 11:28

## 2017-04-09 RX ADMIN — HEPARIN SODIUM 5000 UNITS: 5000 INJECTION, SOLUTION INTRAVENOUS; SUBCUTANEOUS at 17:07

## 2017-04-09 RX ADMIN — SIMVASTATIN 40 MG: 40 TABLET, FILM COATED ORAL at 21:35

## 2017-04-09 RX ADMIN — ACETAMINOPHEN 650 MG: 325 TABLET, FILM COATED ORAL at 07:57

## 2017-04-09 RX ADMIN — ACETAMINOPHEN 650 MG: 325 TABLET, FILM COATED ORAL at 20:51

## 2017-04-09 RX ADMIN — ASPIRIN 81 MG CHEWABLE TABLET 81 MG: 81 TABLET CHEWABLE at 07:54

## 2017-04-09 RX ADMIN — SENNOSIDES AND DOCUSATE SODIUM 2 TABLET: 8.6; 5 TABLET ORAL at 19:34

## 2017-04-09 RX ADMIN — HEPARIN SODIUM 5000 UNITS: 5000 INJECTION, SOLUTION INTRAVENOUS; SUBCUTANEOUS at 11:56

## 2017-04-09 RX ADMIN — SENNOSIDES AND DOCUSATE SODIUM 2 TABLET: 8.6; 5 TABLET ORAL at 07:54

## 2017-04-09 RX ADMIN — ISOSORBIDE MONONITRATE 60 MG: 60 TABLET, EXTENDED RELEASE ORAL at 07:54

## 2017-04-09 ASSESSMENT — PAIN DESCRIPTION - DESCRIPTORS
DESCRIPTORS: SORE

## 2017-04-09 NOTE — PROGRESS NOTES
REGIONAL ANESTHESIA PAIN SERVICE CONTINUOUS NERVE INFUSION NOTE  SUBJECTIVE:  Interval History: Pt reports adequate pain control although continues to have significant pain with coughing. Was able to ambulate in the halls a few times yesterday. Had a BM 2 days ago, although does feel like his abdomen is somewhat distended.  Denies any weakness, paresthesias, circumoral numbness, metallic taste or tinnitus.  Patient is currently without nausea or vomiting. Patient is tolerating a diet.       Clinically Aligned Pain Assessment (CAPA):   Comfort (How is your pain?): Comfortably manageable  Change in Pain (Since your last medication/intervention?): About the same  Pain Control (How are your pain treatments working?):  Partially effective pain control  Functioning (Are you able to do activities to get better?) : Can do most things, but pain gets in the way of some   Sleep (Does your pain management allow you to sleep or rest?): Normal sleep     Numerical Rating Scale:  2/10 at rest but significant with coughing      Anticoagulation:  SC heparin q 8 hours (last dose 2316 4/8)      OBJECTIVE:    Diagnostic:  Lab Results   Component Value Date    WBC 6.9 04/09/2017     Lab Results   Component Value Date    RBC 2.91 04/09/2017     Lab Results   Component Value Date    HGB 8.4 04/09/2017     Lab Results   Component Value Date    HCT 25.0 04/09/2017     Lab Results   Component Value Date     04/09/2017         Vitals:    Temp:  [36.5  C (97.7  F)-37.2  C (99  F)] 36.5  C (97.7  F)  Pulse:  [65-70] 70  Heart Rate:  [60-75] 71  Resp:  [16-20] 18  BP: (114-160)/(69-85) 160/85  SpO2:  [92 %-95 %] 94 %    Exam:    Strength 5/5 and symmetric grossly in bilateral LE   L paravertebral (PV) catheter site with dressing c/d/i, no tenderness, erythema, heme, edema      ASSESSMENT/PLAN:    Larry Wegener is a 67 year old male POD #4 s/p NEPHRECTOMY PARTIAL  NEPHRECTOMY and placement of LEFT T9/10 PV catheter for analgesia.  Pt is  receiving adequate analgesia. Due to multiple disconnections cathter infusion was turned off around midnight and patient states pain has not worsened. Per primary team will possibly discharge patient tomorrow. He is ambulating without difficulty. without difficulty.  No weakness or paresthesias. No evidence of adverse side effects associated with local anesthetic.     -left PVC removed at 10:30 am 4/9 with blue catheter tip intact. Morning dose of SC heparin had been held.  - Please page pain pager if any questions/concerns  - discussed plan with attending anesthesiologist    Mary Ann Oates MD  Regional Anesthesia Pain Service  4/9/2017 11:11 AM    24 hour Job Code Pager.  For in-house use only.     [place]:  * * [phone]  [place]: * * [phone]  Peds: * * [phone]  Enter call-back number and #      This pager only accepts text messages through [facility]

## 2017-04-09 NOTE — PLAN OF CARE
Problem: Goal Outcome Summary  Goal: Goal Outcome Summary  Outcome: Improving  VSS and afebrile.  with 1 unit for correction. Pt denied any pain or nausea. RONNY putting out adequate amounts of red drainage. Pt voiding adequate amounts using bedside commode. Pt on-Q became disconnected. Consulted with anesthesia and was told to put clear dressing over the exposed tube to keep sterile. May remove if no complaints of pain during the night. Pt is a standy by assist. Will continue to monitor and follow plan of care.

## 2017-04-09 NOTE — ANESTHESIA POST-OP FOLLOW-UP NOTE
REGIONAL ANESTHESIA PAIN SERVICE CONTINUOUS NERVE INFUSION NOTE  SUBJECTIVE:  Interval History: Pt reports adequate pain control although continues to have significant pain with coughing. Was able to ambulate in the halls a few times yesterday. Had a BM 2 days ago, although does feel like his abdomen is somewhat distended. Denies any weakness, paresthesias, circumoral numbness, metallic taste or tinnitus. Patient is currently without nausea or vomiting. Patient is tolerating a diet.      Clinically Aligned Pain Assessment (CAPA):   Comfort (How is your pain?): Comfortably manageable  Change in Pain (Since your last medication/intervention?): About the same  Pain Control (How are your pain treatments working?): Partially effective pain control  Functioning (Are you able to do activities to get better?) : Can do most things, but pain gets in the way of some   Sleep (Does your pain management allow you to sleep or rest?): Normal sleep     Numerical Rating Scale:  2/10 at rest but significant with coughing        Anticoagulation: SC heparin q 8 hours (last dose 2316 4/8)        OBJECTIVE:     Diagnostic:        Lab Results   Component Value Date     WBC 6.9 04/09/2017            Lab Results   Component Value Date     RBC 2.91 04/09/2017            Lab Results   Component Value Date     HGB 8.4 04/09/2017            Lab Results   Component Value Date     HCT 25.0 04/09/2017            Lab Results   Component Value Date      04/09/2017            Vitals:    Temp: [36.5  C (97.7  F)-37.2  C (99  F)] 36.5  C (97.7  F)  Pulse: [65-70] 70  Heart Rate: [60-75] 71  Resp: [16-20] 18  BP: (114-160)/(69-85) 160/85  SpO2: [92 %-95 %] 94 %     Exam:   Strength 5/5 and symmetric grossly in bilateral LE  L paravertebral (PV) catheter site with dressing c/d/i, no tenderness, erythema, heme, edema        ASSESSMENT/PLAN:   Larry Wegener is a 67 year old male POD #4 s/p NEPHRECTOMY PARTIAL  NEPHRECTOMY and placement of LEFT T9/10  PV catheter for analgesia. Pt is receiving adequate analgesia. Due to multiple disconnections cathter infusion was turned off around midnight and patient states pain has not worsened. Per primary team will possibly discharge patient tomorrow. He is ambulating without difficulty. without difficulty. No weakness or paresthesias. No evidence of adverse side effects associated with local anesthetic.      -left PVC removed at 10:30 am 4/9 with blue catheter tip intact. Morning dose of SC heparin had been held.  - Please page pain pager if any questions/concerns  - will sign off.    Edwin Mercado DO    [facility]  Pain Management  Department of Anesthesiology

## 2017-04-09 NOTE — ANESTHESIA POST-OP FOLLOW-UP NOTE
"REGIONAL ANESTHESIA PAIN SERVICE CONTINUOUS NERVE INFUSION NOTE     SUBJECTIVE:.     Interval History: Pt reports adequate pain control via continuous peripheral nerve block (CPNB) infusion. Denies any weakness, paresthesias, circumoral numbness, metallic taste or tinnitus. Pt is ambulating with assistance. Patient is currently without nausea or vomiting. Patient is not tolerating a diet.       Clinically Aligned Pain Assessment (CAPA):   Comfort (How is your pain?): Comfortably manageable  Change in Pain (Since your last medication/intervention?): Getting better  Pain Control (How are your pain treatments working?): Fully effective pain control  Functioning (Are you able to do activities to get better?) : Can do most things, but pain gets in the way of some   Sleep (Does your pain management allow you to sleep or rest?): Normal sleep      Numerical Rating Scale:  2/10 at rest           Anticoagulation: Heparin 5000U q8hrs          OBJECTIVE:      Vital signs:  Temp: 37.1  C (98.7  F) Temp src: Oral BP: 112/60   Heart Rate: 59 Resp: 16 SpO2: 94 % O2 Device: None (Room air) Oxygen Delivery: 3 LPM Height: 175.3 cm (5' 9\") Weight: 110.4 kg (243 lb 6.4 oz) (standing)  Estimated body mass index is 35.94 kg/(m^2) as calculated from the following:  Height as of this encounter: 1.753 m (5' 9\").  Weight as of this encounter: 110.4 kg (243 lb 6.4 oz).           Lab Results   Component Value Date     WBC 8.2 04/08/2017     WBC 10.2 04/07/2017     WBC 10.1 04/06/2017     HGB 8.7 (L) 04/08/2017     HGB 9.9 (L) 04/07/2017     HGB 10.8 (L) 04/06/2017     HCT 26.5 (L) 04/08/2017     HCT 29.5 (L) 04/07/2017     HCT 32.6 (L) 04/06/2017      (L) 04/08/2017      04/07/2017      04/06/2017      (L) 04/08/2017      (L) 04/07/2017      04/06/2017     POTASSIUM 4.0 04/08/2017     POTASSIUM 3.9 04/07/2017     POTASSIUM 4.0 04/06/2017     CHLORIDE 94 04/08/2017     CHLORIDE 94 04/07/2017     CHLORIDE 96 " 04/06/2017     CO2 27 04/08/2017     CO2 28 04/07/2017     CO2 26 04/06/2017     BUN 34 (H) 04/08/2017     BUN 40 (H) 04/07/2017     BUN 39 (H) 04/06/2017     CR 1.73 (H) 04/08/2017     CR 1.75 (H) 04/07/2017     CR 1.71 (H) 04/06/2017      (H) 04/08/2017      (H) 04/07/2017      (H) 04/06/2017     NTBNPI 331 04/05/2017     TROPI 0.020 04/06/2017     TROPI 0.016 04/05/2017     AST 28 02/17/2017     ALT 35 02/17/2017     ALKPHOS 83 02/17/2017     BILITOTAL 0.4 02/17/2017     INR 0.99 03/22/2017     INR 0.97 02/17/2017          Exam:   Strength 5/5 and symmetric grossly in bilateral LE      Left paravertebral (PV) catheter site with dressing c/d/i, no tenderness, erythema, heme, edema          ASSESSMENT/PLAN:      Larry Wegener is a 67 year old male POD #3 s/p NEPHRECTOMY PARTIAL;NEPHRECTOMY and placement of left T9/10 PV catheter for analgesia. Pt is receiving adequate analgesia with Ropivacaine 0.2%, total infusion 12mL/hour. Pt is ambulating without difficulty. No weakness or paresthesias, adequate sensory block. No evidence of adverse side effects associated with local anesthetic.       - continue current total infusion of 12mL/hour  - will continue to follow and adjust as needed    Edwin Mercado DO    [facility]  Pain Management  Department of Anesthesiology

## 2017-04-09 NOTE — PLAN OF CARE
Problem: Goal Outcome Summary  Goal: Goal Outcome Summary  OT 7A: Abdominal precautions reinforced this session through further education on adherence during mobility and I/ADLs, modifications to home management tasks discussed at length with pt as will require A from spouse to complete LB dressing and IADLs. LB dressing also address this session, pt familiar with AE and willing to utilize during recovery in addition to receiving assist from wife PRN. Abdominal precaution handout provided to increase recall. Plan to address navigating stairs next session in preparation for dc home.      Per plan established by the OT, the recommendation for dc location is home with assist from family PRN.

## 2017-04-09 NOTE — PLAN OF CARE
Problem: Individualization  Goal: Patient Preferences     AVSS, reporting minimal pain, requested tylenol x2, and states relief with pain intervention.  Blood [id number]=857, 244, 163  Patient up with minimal to no assist, voiding, oliva then placed per order with good output, having bowel movements, tolerating diet with fair appetite, and RONNY remains to bulb suction with 120ml output. Continuous peripheral nerve block disconnected overnight and was then dc'd by anesthesia this AM.  Scheduled meds given as ordered.  Continue to monitor, treat as ordered, notify MDs with changes.  RONNY fluid to be sent for creatinine at 0400 to evaluate for urine leak.  NPO after midnight for possible stent placement.

## 2017-04-09 NOTE — PROGRESS NOTES
"Urology Daily Progress Note    24 hour events/Subjective:     - Feeling a little bloated today and somewhat nauseated   - Ambulated x3 yesterday   - OnQ pumps have not been continuously infusing since midnight   - Pain well controlled     O:  Vitals: /85  Pulse 70  Temp 97.7  F (36.5  C) (Oral)  Resp 18  Ht 1.753 m (5' 9\")  Wt 110.4 kg (243 lb 6.4 oz)  SpO2 94%  BMI 35.94 kg/m2  General: Alert, interactive, in NAD  Resp: Non-labored breathing on RA  Abdomen: Soft, mildly -tender on left, mild distention. Incisions c/d/i w/ dermabond; no erythema or drainage. Small bandage at posterior aspect, taken down and site examined, no significant drainage.  Ext: Warm and well perfused   Drain: Serosanguinous    UOP 1700/87  RONNY 295/220    Labs/Imaging  AM labs pending    Assessment/Plan  67 year old y/o male POD#4  s/p open left partial nephrectomy for renal mass.     NEURO Vicodin (pt preference), continue On-Q pumps- anesthesia to evaluate today given issues with infusion   CV HDS.    PULM Aggressive pulmonary toilet and I/S.   FEN/GI mIVF. Continue carb consistent diet. Bowel regimen    Recheck RONNY Cr today. Oliva out, pt voiding spontaneously   HEME No issues   ID Afebrile, no leukocytosis.    Antibiotics: Completed periop antibiotics    ENDO No issues   ACTIVITY Up as tolerated  PT/OT   PPx Heparin 5000 U TID   DISPO 7B, anticipate d/c tomorrow once PO intake improves     Seen and examined with chief resident and Dr Lewis    Addendum: RONNY Cr returned at 40.9, confirmed that it was sent from RONNY drain. Will replace oliva and make NPO at midnight in case a stent placement is required. Will recheck RONNY Cr tomorrow AM.    --  Eliud Gray, PGY-4  Urology Resident    Patient was seen, evaluated and plan was formulated in conjunction with me and I agree with the above.  Charline Lewis MD     Contacting the Urology Team     Please use the following job codes to reach the Urology Team. Note that you must use an " in house phone and that job codes cannot receive text pages.     On weekdays, dial [phone] (or star-star-star 777 on the new [employer] telephones) then 0817 to reach the Adult Urology resident or PA on call    On weekdays, dial [phone] (or star-star-star 777 on the new [employer] telephones) then 0818 to reach the Pediatric Urology resident    On weeknights and weekends, dial [phone] (or star-star-star 777 on the new [employer] telephones) then [phone] to reach the Urology resident on call (for both Adult and Pediatrics)

## 2017-04-09 NOTE — ADDENDUM NOTE
Addendum  created 04/09/17 1704 by Edwin Mercado,     Anesthesia Event edited, Cosign clinical note with attestation, Procedure Event Log accessed, Sign clinical note

## 2017-04-09 NOTE — PROGRESS NOTES
Regional Anesthesia Note 4/8/17:     Notified by nurse that PVC plastic connector became disconnected when patient accidentally pulled off while trying to get out of bed again (happened earlier today as well).  I replaced with a new sterile plastic connector and filter using sterile gloves and mask. Prior to replacement catheter was cleaned with chloraprep.      Aicha Balderas MD  [place] 2 Anesthesia Resident  Pager [phone]

## 2017-04-10 ENCOUNTER — APPOINTMENT (OUTPATIENT)
Dept: OCCUPATIONAL THERAPY | Facility: CLINIC | Age: 67
DRG: 657 | End: 2017-04-10
Attending: UROLOGY
Payer: MEDICARE

## 2017-04-10 VITALS
BODY MASS INDEX: 35.09 KG/M2 | OXYGEN SATURATION: 96 % | TEMPERATURE: 98.1 F | WEIGHT: 236.9 LBS | SYSTOLIC BLOOD PRESSURE: 130 MMHG | HEART RATE: 62 BPM | DIASTOLIC BLOOD PRESSURE: 71 MMHG | HEIGHT: 69 IN | RESPIRATION RATE: 18 BRPM

## 2017-04-10 LAB
ANION GAP SERPL CALCULATED.3IONS-SCNC: 8 MMOL/L (ref 3–14)
BUN SERPL-MCNC: 22 MG/DL (ref 7–30)
CALCIUM SERPL-MCNC: 7.6 MG/DL (ref 8.5–10.1)
CHLORIDE SERPL-SCNC: 99 MMOL/L (ref 94–109)
CO2 SERPL-SCNC: 30 MMOL/L (ref 20–32)
CREAT FLD-MCNC: 40.8 MG/DL
CREAT SERPL-MCNC: 1.42 MG/DL (ref 0.66–1.25)
GFR SERPL CREATININE-BSD FRML MDRD: 49 ML/MIN/1.7M2
GLUCOSE BLDC GLUCOMTR-MCNC: 117 MG/DL (ref 70–99)
GLUCOSE BLDC GLUCOMTR-MCNC: 132 MG/DL (ref 70–99)
GLUCOSE SERPL-MCNC: 112 MG/DL (ref 70–99)
POTASSIUM SERPL-SCNC: 3.3 MMOL/L (ref 3.4–5.3)
SODIUM SERPL-SCNC: 137 MMOL/L (ref 133–144)
SPECIMEN SOURCE FLD: NORMAL

## 2017-04-10 PROCEDURE — 82570 ASSAY OF URINE CREATININE: CPT | Performed by: UROLOGY

## 2017-04-10 PROCEDURE — 36415 COLL VENOUS BLD VENIPUNCTURE: CPT | Performed by: UROLOGY

## 2017-04-10 PROCEDURE — 25000132 ZZH RX MED GY IP 250 OP 250 PS 637: Mod: GY | Performed by: UROLOGY

## 2017-04-10 PROCEDURE — 97530 THERAPEUTIC ACTIVITIES: CPT | Mod: GO | Performed by: OCCUPATIONAL THERAPIST

## 2017-04-10 PROCEDURE — 25000128 H RX IP 250 OP 636: Performed by: UROLOGY

## 2017-04-10 PROCEDURE — 25800025 ZZH RX 258: Performed by: UROLOGY

## 2017-04-10 PROCEDURE — A9270 NON-COVERED ITEM OR SERVICE: HCPCS | Mod: GY | Performed by: UROLOGY

## 2017-04-10 PROCEDURE — 00000146 ZZHCL STATISTIC GLUCOSE BY METER IP

## 2017-04-10 PROCEDURE — S5010 5% DEXTROSE AND 0.45% SALINE: HCPCS | Performed by: UROLOGY

## 2017-04-10 PROCEDURE — 80048 BASIC METABOLIC PNL TOTAL CA: CPT | Performed by: UROLOGY

## 2017-04-10 PROCEDURE — 40000133 ZZH STATISTIC OT WARD VISIT: Performed by: OCCUPATIONAL THERAPIST

## 2017-04-10 RX ORDER — HYDROCODONE BITARTRATE AND ACETAMINOPHEN 5; 325 MG/1; MG/1
1-2 TABLET ORAL EVERY 4 HOURS PRN
Qty: 30 TABLET | Refills: 0 | Status: SHIPPED | OUTPATIENT
Start: 2017-04-10

## 2017-04-10 RX ORDER — AMOXICILLIN 250 MG
1-2 CAPSULE ORAL 2 TIMES DAILY
Qty: 100 TABLET | Refills: 0 | Status: SHIPPED | OUTPATIENT
Start: 2017-04-10

## 2017-04-10 RX ORDER — OXYCODONE HYDROCHLORIDE 5 MG/1
5 TABLET ORAL EVERY 4 HOURS PRN
Qty: 30 TABLET | Refills: 0 | Status: SHIPPED | OUTPATIENT
Start: 2017-04-10 | End: 2017-04-10

## 2017-04-10 RX ORDER — POTASSIUM CHLORIDE 750 MG/1
40 TABLET, EXTENDED RELEASE ORAL ONCE
Status: COMPLETED | OUTPATIENT
Start: 2017-04-10 | End: 2017-04-10

## 2017-04-10 RX ADMIN — ASPIRIN 81 MG CHEWABLE TABLET 81 MG: 81 TABLET CHEWABLE at 07:42

## 2017-04-10 RX ADMIN — DEXTROSE AND SODIUM CHLORIDE: 5; 450 INJECTION, SOLUTION INTRAVENOUS at 05:05

## 2017-04-10 RX ADMIN — ATENOLOL 50 MG: 25 TABLET ORAL at 07:42

## 2017-04-10 RX ADMIN — HEPARIN SODIUM 5000 UNITS: 5000 INJECTION, SOLUTION INTRAVENOUS; SUBCUTANEOUS at 07:42

## 2017-04-10 RX ADMIN — ISOSORBIDE MONONITRATE 60 MG: 60 TABLET, EXTENDED RELEASE ORAL at 07:42

## 2017-04-10 RX ADMIN — HEPARIN SODIUM 5000 UNITS: 5000 INJECTION, SOLUTION INTRAVENOUS; SUBCUTANEOUS at 00:18

## 2017-04-10 RX ADMIN — HYDROCODONE BITARTRATE AND ACETAMINOPHEN 1 TABLET: 5; 325 TABLET ORAL at 00:16

## 2017-04-10 RX ADMIN — POTASSIUM CHLORIDE 40 MEQ: 750 TABLET, EXTENDED RELEASE ORAL at 11:36

## 2017-04-10 RX ADMIN — SENNOSIDES AND DOCUSATE SODIUM 2 TABLET: 8.6; 5 TABLET ORAL at 07:42

## 2017-04-10 RX ADMIN — ACETAMINOPHEN 650 MG: 325 TABLET, FILM COATED ORAL at 04:55

## 2017-04-10 RX ADMIN — FAMOTIDINE 40 MG: 20 TABLET ORAL at 07:42

## 2017-04-10 NOTE — PLAN OF CARE
Problem: Goal Outcome Summary  Goal: Goal Outcome Summary  Outcome: No Change  VSS and afebrile. . Pt has a oliva with adequate amounts of urine and a RONNY with moderate amounts of dark red drainage. RONNY sample sent down at 0400 for creatinine level to check for a urine leak. Pt had no BM with active bowel sounds with passing gas and burping. Pt feels the bloating in the abdomen. Pt received tylenol x2 and Norco x1 for pain. Pt became NPO at midnight for a possible stent replacement today. Pt is a stand-by and has a PIV with D5 1/2 NS running at 50ml/hr. Will continue to monitor and follow plan of care.

## 2017-04-10 NOTE — PLAN OF CARE
Problem: Goal Outcome Summary  Goal: Goal Outcome Summary  PT/7A: PT is deferring at this time, per OT report pt is DCing today and no PT needs at this time.

## 2017-04-10 NOTE — PROGRESS NOTES
"Urology Daily Progress Note    24 hour events/Subjective:    -Passing flatus  -Tolerating diet without nausea or vomiting     O:  Vitals: /70 (BP Location: Right arm)  Pulse 59  Temp 98.1  F (36.7  C) (Oral)  Resp 18  Ht 1.753 m (5' 9\")  Wt 107.5 kg (236 lb 14.4 oz)  SpO2 96%  BMI 34.98 kg/m2  General: Alert, interactive, in NAD  Resp: Non-labored breathing on RA  Abdomen: Soft, non-tender, mild distention. Incisions c/d/i w/ dermabond; no erythema or drainage.   Ext: Warm and well perfused  Drain: Serosanguinous    UOP 2075/650    Labs/Imaging  AM labs pending    Assessment/Plan  67 year old y/o male POD#5  s/p open left partial nephrectomy for renal mass.  Postoperative course notable for suspected urine tai     NEURO Pain well controlled on current regimen   CV HDS.    PULM Aggressive pulmonary toilet and I/S.   FEN/GI mIVF. Continue carb consistent diet. ROBF.  Bowel regimen    Recheck RONNY creatinine this morning; OK to d/c Carvajal today--order placed, and discussed with RN. Please check PVR to ensure adequate emptying prior to discharge.   HEME No issues   ID Afebrile, no leukocytosis.    Antibiotics: Completed periop antibiotics    ENDO No issues   ACTIVITY Up as tolerated  PT/OT   PPx Heparin 5000 U TID   DISPO 7B, DC to home pending AM RONNY creatinine     Seen and examined with chief resident.  Discussed with Dr Mitchell.      HERMAN Lu MD  Urology Resident       Contacting the Urology Team     Please use the following job codes to reach the Urology Team. Note that you must use an in house phone and that job codes cannot receive text pages.     On weekdays, dial [phone] (or star-star-star 777 on the new [employer] telephones) then 0817 to reach the Adult Urology resident or PA on call    On weekdays, dial [phone] (or star-star-star 777 on the new [employer] telephones) then 0818 to reach the Pediatric Urology resident    On weeknights and weekends, dial [phone] (or star-star-star 777 on the new [employer] telephones) " then [phone] to reach the Urology resident on call (for both Adult and Pediatrics)

## 2017-04-10 NOTE — DISCHARGE SUMMARY
Discharge Summary     Larry Wegener MRN# [id number]   YOB: 1950 Age: 67 year old     Date of Admission:  4/5/2017  Date of Discharge::  4/10/2017  Admitting Physician:  Joe Mitchell MD  Discharge Physician:  Conrado Willis MD  Primary Care Physician:         Tino Pate          Admission Diagnoses:   Left Renal Mass   Renal cancer (H)          Discharge Diagnosis:   Left Renal Mass   Renal cancer (H)  Acute blood loss anemia due to surgery for malignancy.         Procedures:   4/5/17: Procedure(s):  Left Open Partial Nephrectomy Flank Incision, Ultrasound  general with block Latex Safe - Wound Class: I-Clean        Non-operative procedures:   None performed          Consultations:   PHYSICAL THERAPY ADULT IP CONSULT  OCCUPATIONAL THERAPY ADULT IP CONSULT         Imaging Studies:     Results for orders placed or performed during the hospital encounter of 04/05/17   XR Abdomen Port 1 View    Narrative    Exam:  Abdominal radiograph, 4/5/2017 2:03 PM    History: Instrument count status post nephrectomy    Comparison:  No relevant comparison    Findings:  Per report, at instrument count a moderate-sized piece of  one of the retractors was missing. PA and oblique radiographs of the  abdomen are evaluated. There are surgical clips and a drain in the  left abdomen. There is no radiopaque foreign body matching description  given by OR personnel.      Impression    Impression:  No radiopaque foreign body matching the description of  the missing instrument    I have personally reviewed the examination and initial interpretation  and I agree with the findings.    GARRETT YANG MD            Medications Prior to Admission:     Prescriptions Prior to Admission   Medication Sig Dispense Refill Last Dose     atenolol (TENORMIN) 50 MG tablet Take 50 mg by mouth every morning    4/5/2017 at 0500     chlorthalidone (HYGROTON) 25 MG tablet Take 25 mg by mouth every  morning    4/4/2017 at 0800     famotidine (PEPCID) 40 MG tablet Take 40 mg by mouth 2 times daily    4/5/2017 at 0500     insulin aspart (NOVOLOG PEN) 100 UNIT/ML injection Inject Subcutaneous 4 times daily (with meals and nightly) Takes base of 12 units +SSI at breakfast   Takes base of 12 units +SSI at lunch  Takes base of 14 units +SSI at supper  Takes base of 12 units +SSI at bedtime   4/4/2017 at 2100     insulin glargine (LANTUS) 100 UNIT/ML injection Inject 45 Units Subcutaneous At Bedtime    4/4/2017 at 2100     isosorbide mononitrate (IMDUR) 60 MG 24 hr tablet Take 60 mg by mouth every morning    4/4/2017 at 0800     losartan (COZAAR) 25 MG tablet Take 25 mg by mouth every morning    4/4/2017 at 0800     metFORMIN (GLUCOPHAGE) 500 MG tablet Take 1,000 mg by mouth 2 times daily (with meals)    4/4/2017 at Unknown time     potassium gluconate 2.5 MEQ TABS tablet Take 2.5 mEq by mouth every morning    4/4/2017 at 0800     Multiple Vitamins-Minerals (MULTIVITAMIN ADULT) TABS Take 1 tablet by mouth every evening    Past Month at Unknown time     oxymetazoline (AFRIN) 0.05 % spray Spray 2 sprays into both nostrils 2 times daily as needed for congestion   4/4/2017 at 2100     aspirin 325 MG tablet March 22, 2017 - Patient has been holding this medication in preparation for surgery.  Not taken since December 2016.  Plans to resume after surgery in April, will continue to hold for now.  Previously was on 325 mg by mouth daily.   More than a month at Unknown time     simvastatin (ZOCOR) 40 MG tablet Take 40 mg by mouth At Bedtime    4/3/2017     Omega-3 Fatty Acids (FISH OIL PO) March 22, 2017 - Patient has been holding this medication in preparation for surgery.  Not taken since December 2016.  Plans to resume after surgery in April, will continue to hold for now.  Previously was on fish oil 1000 mg by mouth twice daily.   More than a month at Unknown time     NITROGLYCERIN SL Place 0.4 mg under the tongue every 5  minutes as needed for chest pain   Unknown at Unknown time     ACETAMINOPHEN PO Take 500-1,000 mg by mouth every 8 hours as needed for pain   More than a month at Unknown time            Discharge Medications:     Current Discharge Medication List      START taking these medications    Details   order for DME Equipment being ordered: Walker ()  Treatment Diagnosis: Limited mobility  Qty: 1 each, Refills: 0    Associated Diagnoses: Renal cancer, unspecified laterality (H)      HYDROcodone-acetaminophen (NORCO) 5-325 MG per tablet Take 1-2 tablets by mouth every 4 hours as needed for moderate to severe pain  Qty: 30 tablet, Refills: 0    Associated Diagnoses: Renal cancer, unspecified laterality (H)      senna-docusate (SENOKOT-S;PERICOLACE) 8.6-50 MG per tablet Take 1-2 tablets by mouth 2 times daily  Qty: 100 tablet, Refills: 0    Associated Diagnoses: Renal cancer, unspecified laterality (H)         CONTINUE these medications which have NOT CHANGED    Details   atenolol (TENORMIN) 50 MG tablet Take 50 mg by mouth every morning       chlorthalidone (HYGROTON) 25 MG tablet Take 25 mg by mouth every morning       famotidine (PEPCID) 40 MG tablet Take 40 mg by mouth 2 times daily       insulin aspart (NOVOLOG PEN) 100 UNIT/ML injection Inject Subcutaneous 4 times daily (with meals and nightly) Takes base of 12 units +SSI at breakfast   Takes base of 12 units +SSI at lunch  Takes base of 14 units +SSI at supper  Takes base of 12 units +SSI at bedtime      insulin glargine (LANTUS) 100 UNIT/ML injection Inject 45 Units Subcutaneous At Bedtime       isosorbide mononitrate (IMDUR) 60 MG 24 hr tablet Take 60 mg by mouth every morning       losartan (COZAAR) 25 MG tablet Take 25 mg by mouth every morning       metFORMIN (GLUCOPHAGE) 500 MG tablet Take 1,000 mg by mouth 2 times daily (with meals)       potassium gluconate 2.5 MEQ TABS tablet Take 2.5 mEq by mouth every morning       Multiple Vitamins-Minerals  (MULTIVITAMIN ADULT) TABS Take 1 tablet by mouth every evening       oxymetazoline (AFRIN) 0.05 % spray Spray 2 sprays into both nostrils 2 times daily as needed for congestion      aspirin 325 MG tablet March 22, 2017 - Patient has been holding this medication in preparation for surgery.  Not taken since December 2016.  Plans to resume after surgery in April, will continue to hold for now.  Previously was on 325 mg by mouth daily.      simvastatin (ZOCOR) 40 MG tablet Take 40 mg by mouth At Bedtime       Omega-3 Fatty Acids (FISH OIL PO) March 22, 2017 - Patient has been holding this medication in preparation for surgery.  Not taken since December 2016.  Plans to resume after surgery in April, will continue to hold for now.  Previously was on fish oil 1000 mg by mouth twice daily.      NITROGLYCERIN SL Place 0.4 mg under the tongue every 5 minutes as needed for chest pain      ACETAMINOPHEN PO Take 500-1,000 mg by mouth every 8 hours as needed for pain                    Brief History of Illness:   Reason for admission requiring a surgical or invasive procedure:   Left Renal Mass    The patient underwent the following procedure(s):   See above   There were no immediate complications during this procedure.    Please refer to the full operative summary for details.           Hospital Course:   The patient's hospital course was remarkable for a postop urine leak, detected in RONNY fluid. Given that he was having antegrade bowel function and tolerating a regular diet, and that his RONNY fluid output was ~300 ml per day (not a large leak) we opted for conservative management in the hopes that it will resolve without further intervention. He was discharged to home with the RONNY drain still in place and plan to remove this once outputs decreased to <150 ml per day.      On POD#5 patient was ambulating without assitance, tolerating the discharge diet, had pain controlled with PO medications to go home with, and requiring no IV  "medications or fluids. Patient was discharged home with appropriate contact information, follow-up and instructions as seen below in the discharge paperwork.       Final Pathology Result:   Pending at time of discharge         Discharge Instructions and Follow-Up:     Discharge Procedure Orders  Reason for your hospital stay   Order Comments: You were in the hospital to have surgery on your kidney     Adult [facility] Follow-up and recommended labs and tests   Order Comments: Follow-up:  -Return to clinic to see Dr. Mitchell on 4/21/17 as previously arranged.  Contact our clinic using the numbers below if you have questions about this.    Phone numbers:   - Monday through Friday 8am to 4:30pm: Call [phone] with questions or to schedule or confirm appointment.    - Nights or weekends: call the after hours emergency pager - [phone] and tell the  \"I would like to page the Urology Resident on call.\"  - For emergencies, call [phone]     Activity   Order Comments: Activity  - No strenuous exercise for 6 weeks.  - No lifting, pushing, pulling more than 10 pounds for 6 weeks.   - Do not strain with bowel movements.  - Do not drive until you can press the brake pedal quickly and fully without pain.   - Do not operate a motor vehicle while taking narcotic pain medications (if prescribed)   Order Specific Question Answer Comments   Is discharge order? Yes      When to contact your care team   Order Comments: - Call or return sooner than your regularly scheduled visit if you develop any of the following: fever (greater than 101.5), uncontrolled pain, uncontrolled nausea or vomiting, as well as increased redness, swelling, or drainage from your wound (if present).  Call or return to ER if you have blood in urine that is getting worse (rather than getting better) or if you are passing clots and unable to urinate     Wound care and dressings   Order Comments: Incisions (if present)  - You may shower and get incisions " wet starting 48 hrs after surgery.  - Do not scrub incisions or submerge wounds for 2 weeks or until seen in follow-up.   - Remove wound dressing 48 hours after surgery.   - If purple dermabond glue was used, avoid applying any lotions or ointments.   - If steri-strips were used, they will fall off on their own.   - Leave incision open to air. Cover with gauze only if needed for comfort or to protect clothing from drainage.   - The stitches do not need to be removed, they will dissolve on their own.     Discharge Instructions   Order Comments: Medications  - Take antibiotics as prescribed (if prescribed)  - Transition from narcotic pain medications to tylenol (acetaminophen) as you are able.  Wean yourself off all pain medications as you are able.  - Some pain medications contain both tylenol (acetaminophen) and a narcotic (Norco, vicodin, percocet), do not take more than 4,000mg of Tylenol (acetaminophen) from all sources in any 24 hour period.  - Narcotics can make you constipated.  Take over the counter fiber (metamucil or benefiber) and stool softeners (miralax, docusate or senna) while taking narcotic pain medications, but stop if you develop diarrhea.  - No driving or operating machinery while taking narcotic pain medications     Monitor and record   Order Comments: Drain output, record daily amount in milliliters     Tubes and drains   Order Comments: You are going home with the following tubes or drains: RONNY.  Tube cares per hospital or home care instructions  - Arrange to have drain removed at clinic near home once daily outputs are less than 150 ml per day     Full Code     Diet   Order Comments: Resume previous diet upon discharge   Order Specific Question Answer Comments   Is discharge order? Yes               Discharge Disposition:   Discharged to Home      Condition at discharge: Good    --    Conrado Willis MD  Urology Resident    12:21 PM, 4/10/2017

## 2017-04-10 NOTE — PLAN OF CARE
"Problem: Individualization  Goal: Patient Preferences  Patient's VSS, afebrile, denies pain except for a feeling of being \"full\" Patient is passing gas, had a BM 2 days ago, is slightly distended but bowel sounds are audible and active. Patient belching a lot and had some dry heaving with activity this AM. Carvajal removed this morning; patient voided with no issues. RONNY remains intact; Creat from drain elevated; Patient to discharge to home with drain; drain cares shown to family and teach back demonstrated with no further questions. Patient anxious to discharge; discharge orders written and went over with patient and family. Patient discharged to home with family.       "

## 2017-04-10 NOTE — PLAN OF CARE
Problem: Goal Outcome Summary  Goal: Goal Outcome Summary  OT:  Recommend home with spouse, declined home PT for strengthening.  Educated in EC, graded activity improvement while at home.  Ambulated 500' with walker with several rest breaks, completed 3 steps with handrails and tub transfer.  Will dispense walker prior to DC.  Also recommend shower chair, patient to obtain from outside source.           Occupational Therapy Discharge Summary    Reason for therapy discharge:    All goals and outcomes met, no further needs identified.    Progress towards therapy goal(s). See goals on Care Plan in [facility] electronic health record for goal details.  Goals met    Therapy recommendation(s):    No further therapy is recommended.

## 2017-04-11 ENCOUNTER — CARE COORDINATION (OUTPATIENT)
Dept: CARDIOLOGY | Facility: CLINIC | Age: 67
End: 2017-04-11

## 2017-04-11 LAB — COPATH REPORT: NORMAL

## 2017-04-11 NOTE — PROGRESS NOTES
"[facility]  \"Hello, my name is Karime Jaquez , and I am calling from the [facility].  I want to check in and see how you are doing, after leaving the hospital.  You may also receive a call from your Care Coordinator (care team), but I want to make sure you don t have any urgent needs.  I have a couple questions to review with you:     Post-Discharge Outreach                                                    Larry Wegener is a 67 year old male     Follow-up Appointments           Adult [facility] Follow-up and recommended labs and tests       Follow-up:  -Return to clinic to see Dr. Mitchell on 4/21/17 as previously arranged. Contact our clinic using the numbers below if you have questions about this.     Phone numbers:   - Monday through Friday 8am to 4:30pm: Call [phone] with questions or to schedule or confirm appointment.   - Nights or weekends: call the after hours emergency pager - [phone] and tell the  \"I would like to page the Urology Resident on call.\"  - For emergencies, call 911                       Your next 10 appointments already scheduled            Apr 21, 2017 1:00 PM CDT   (Arrive by 12:45 PM)   Post-Op with Joe Mitchell MD   [facility] Urology and [facility] ([facility])              Care Team:    Patient Care Team       Relationship Specialty Notifications Start End    Tino Pate PCP - General Family Practice  4/5/17     Phone: [phone] Fax: [phone]         [facility] [address] [facility] [address]    Joe Mitchell MD MD Urology  1/23/17     Phone: [phone] Fax: [phone]         [facility] [address] [facility] [address]    Saida Mclaughlin MD Urology  1/23/17     Phone: [phone] Fax: [phone]         [facility] [place] [address]  [facility] [address]    Irma Malagon, RN Registered Nurse Urology  2/27/17     Self, " MD Krishna Referring Physician   2/27/17             Transition of Care Review                                                      Did you have a surgery or procedure during your hospital visit? Yes   If yes, do you have any of the following:     Signs of infection:  No    Pain:  No     Pain Scale (0-10) 0/10     Location: NA    Wound/incision concerns? No    Do you have all of your medications/refills?  Yes    Are you having any side effects or questions about your medication(s)? No    Do you have any new or worsening symptoms?  No    Do you have any future appointments scheduled?   Yes             Plan                                                      Thanks for your time.  Your Care Coordinator may follow-up within the next couple days.  In the meantime if you have questions, concerns or problems call your care team.        Karime Jaquez

## 2017-04-17 ENCOUNTER — PRE VISIT (OUTPATIENT)
Dept: UROLOGY | Facility: CLINIC | Age: 67
End: 2017-04-17

## 2017-04-17 NOTE — TELEPHONE ENCOUNTER
Post op.  S/P Left Open Partial Nephrectomy Flank Incision on 4-5-17.  Records available in [facility].

## 2017-04-21 ENCOUNTER — OFFICE VISIT (OUTPATIENT)
Dept: SURGERY | Facility: CLINIC | Age: 67
End: 2017-04-21

## 2017-04-21 ENCOUNTER — ANESTHESIA EVENT (OUTPATIENT)
Dept: SURGERY | Facility: AMBULATORY SURGERY CENTER | Age: 67
End: 2017-04-21

## 2017-04-21 ENCOUNTER — OFFICE VISIT (OUTPATIENT)
Dept: UROLOGY | Facility: CLINIC | Age: 67
End: 2017-04-21

## 2017-04-21 ENCOUNTER — ALLIED HEALTH/NURSE VISIT (OUTPATIENT)
Dept: UROLOGY | Facility: CLINIC | Age: 67
End: 2017-04-21

## 2017-04-21 ENCOUNTER — APPOINTMENT (OUTPATIENT)
Dept: SURGERY | Facility: CLINIC | Age: 67
End: 2017-04-21

## 2017-04-21 VITALS
BODY MASS INDEX: 33.47 KG/M2 | DIASTOLIC BLOOD PRESSURE: 78 MMHG | SYSTOLIC BLOOD PRESSURE: 154 MMHG | HEIGHT: 69 IN | HEART RATE: 90 BPM | WEIGHT: 226 LBS

## 2017-04-21 VITALS
SYSTOLIC BLOOD PRESSURE: 154 MMHG | TEMPERATURE: 98.4 F | HEART RATE: 90 BPM | HEIGHT: 69 IN | OXYGEN SATURATION: 97 % | BODY MASS INDEX: 33.47 KG/M2 | WEIGHT: 226 LBS | DIASTOLIC BLOOD PRESSURE: 78 MMHG | RESPIRATION RATE: 16 BRPM

## 2017-04-21 DIAGNOSIS — C64.2 MALIGNANT NEOPLASM OF LEFT KIDNEY (H): Primary | ICD-10-CM

## 2017-04-21 DIAGNOSIS — C64.2 MALIGNANT NEOPLASM OF LEFT KIDNEY (H): ICD-10-CM

## 2017-04-21 DIAGNOSIS — Z01.818 PREOP EXAMINATION: Primary | ICD-10-CM

## 2017-04-21 DIAGNOSIS — Z01.818 PREOP EXAMINATION: ICD-10-CM

## 2017-04-21 LAB
ALBUMIN UR-MCNC: 30 MG/DL
ANION GAP SERPL CALCULATED.3IONS-SCNC: 8 MMOL/L (ref 3–14)
APPEARANCE UR: ABNORMAL
BACTERIA #/AREA URNS HPF: ABNORMAL /HPF
BILIRUB UR QL STRIP: NEGATIVE
BUN SERPL-MCNC: 38 MG/DL (ref 7–30)
CALCIUM SERPL-MCNC: 9 MG/DL (ref 8.5–10.1)
CHLORIDE SERPL-SCNC: 98 MMOL/L (ref 94–109)
CO2 SERPL-SCNC: 30 MMOL/L (ref 20–32)
COLOR UR AUTO: YELLOW
CREAT SERPL-MCNC: 1.34 MG/DL (ref 0.66–1.25)
ERYTHROCYTE [DISTWIDTH] IN BLOOD BY AUTOMATED COUNT: 13.5 % (ref 10–15)
GFR SERPL CREATININE-BSD FRML MDRD: 53 ML/MIN/1.7M2
GLUCOSE SERPL-MCNC: 116 MG/DL (ref 70–99)
GLUCOSE UR STRIP-MCNC: NEGATIVE MG/DL
HBA1C MFR BLD: 7.6 % (ref 4.3–6)
HCT VFR BLD AUTO: 32.3 % (ref 40–53)
HGB BLD-MCNC: 10.4 G/DL (ref 13.3–17.7)
HGB UR QL STRIP: ABNORMAL
KETONES UR STRIP-MCNC: NEGATIVE MG/DL
LEUKOCYTE ESTERASE UR QL STRIP: ABNORMAL
MCH RBC QN AUTO: 28.7 PG (ref 26.5–33)
MCHC RBC AUTO-ENTMCNC: 32.2 G/DL (ref 31.5–36.5)
MCV RBC AUTO: 89 FL (ref 78–100)
MUCOUS THREADS #/AREA URNS LPF: PRESENT /LPF
NITRATE UR QL: NEGATIVE
PH UR STRIP: 5 PH (ref 5–7)
PLATELET # BLD AUTO: 520 10E9/L (ref 150–450)
POTASSIUM SERPL-SCNC: 4 MMOL/L (ref 3.4–5.3)
RBC # BLD AUTO: 3.62 10E12/L (ref 4.4–5.9)
RBC #/AREA URNS AUTO: 58 /HPF (ref 0–2)
SODIUM SERPL-SCNC: 136 MMOL/L (ref 133–144)
SP GR UR STRIP: 1.02 (ref 1–1.03)
SQUAMOUS #/AREA URNS AUTO: <1 /HPF (ref 0–1)
URN SPEC COLLECT METH UR: ABNORMAL
UROBILINOGEN UR STRIP-MCNC: 0 MG/DL (ref 0–2)
WBC # BLD AUTO: 8.5 10E9/L (ref 4–11)
WBC #/AREA URNS AUTO: 119 /HPF (ref 0–2)

## 2017-04-21 RX ORDER — CEFAZOLIN SODIUM 1 G/3ML
1 INJECTION, POWDER, FOR SOLUTION INTRAMUSCULAR; INTRAVENOUS SEE ADMIN INSTRUCTIONS
Status: CANCELLED | OUTPATIENT
Start: 2017-04-21

## 2017-04-21 RX ORDER — CIPROFLOXACIN 250 MG/1
250 TABLET, FILM COATED ORAL 2 TIMES DAILY
Qty: 14 TABLET | Refills: 0 | Status: SHIPPED | OUTPATIENT
Start: 2017-04-21 | End: 2017-04-28

## 2017-04-21 ASSESSMENT — LIFESTYLE VARIABLES: TOBACCO_USE: 1

## 2017-04-21 ASSESSMENT — PAIN SCALES - GENERAL: PAINLEVEL: MILD PAIN (2)

## 2017-04-21 NOTE — H&P
Pre-Operative H & P     CC:  Preoperative exam to assess for increased cardiopulmonary risk while undergoing surgery and anesthesia.    Date of Encounter: 4/21/2017  Primary Care Physician:  Tino Pate  Larry Wegener is a 67 year old male who presents for pre-operative H & P in preparation for a cystoscopy, left stent placement with Dr. Mitchell on 4/27/17 at [facility] [place].     Larry Wegener is a67 year old male with CAD, hypertension, hyperlipidemia, old MI, s/p CABG, s/p coronary stents, obesity, history of smoking, anemia, diabetic neuropathy, diabetic retinopathy, diabetes, GERD, CKD, bladder cancer and left renal cancer that has a current left ureteral stricture.  He had a left partial nephrectomy on 4/5/2017 for renal cancer and since then the surgical drain has continued to have a significant amount of output.  It is thought that a ureteral stricture may be the cause so the above listed procedure has been recommended for treatment.        History is obtained from the patient.     Past Medical History  Past Medical History:   Diagnosis Date     Bladder cancer (H)      CAD (coronary artery disease)      CKD (chronic kidney disease)      Diabetic retinopathy (H)      DM II (diabetes mellitus, type II), controlled (H)      GERD (gastroesophageal reflux disease)      History of blood transfusion     no reactions     History of coronary artery bypass graft      History of smoking      HTN (hypertension)      Hyperlipidemia      Neuropathy (H)      Obesity      Old MI (myocardial infarction)      Presence of stent in coronary artery 1997    x3     Renal cancer, left (H) 2017     Ureteral stricture, left        Past Surgical History  Past Surgical History:   Procedure Laterality Date     ANGIOGRAM  1997    stent     CATARACT IOL, RT/LT Left 2009     CATARACT IOL, RT/LT Right 2013     CORONARY ARTERY BYPASS  2004     CYSTOSCOPY, BIOPSY BLADDER INSTILL OPTICAL AGENT        NEPHRECTOMY PARTIAL Left 4/5/2017    Procedure: NEPHRECTOMY PARTIAL;  Surgeon: Joe Mitchell MD;  Location: [facility] OR     OTHER SURGICAL HISTORY  12/14/2016    bladder tumor resection in [place], [place]     OTHER SURGICAL HISTORY  03/2017    2nd bladder tumor resection in [facility]     TONSILLECTOMY         Hx of Blood transfusions/reactions: yes, no reactions     Hx of abnormal bleeding or anti-platelet use: stopped aspirin yesterday      Steroid use in the last year: none    Personal or FH with difficulty with Anesthesia:  none    Prior to Admission Medications  Current Outpatient Prescriptions   Medication Sig Dispense Refill     ciprofloxacin (CIPRO) 250 MG tablet Take 1 tablet (250 mg) by mouth 2 times daily for 7 days 14 tablet 0     order for DME Equipment being ordered: Walker ()  Treatment Diagnosis: Limited mobility 1 each 0     HYDROcodone-acetaminophen (NORCO) 5-325 MG per tablet Take 1-2 tablets by mouth every 4 hours as needed for moderate to severe pain (Patient not taking: Reported on 4/21/2017) 30 tablet 0     senna-docusate (SENOKOT-S;PERICOLACE) 8.6-50 MG per tablet Take 1-2 tablets by mouth 2 times daily (Patient taking differently: Take 1 tablet by mouth every evening ) 100 tablet 0     aspirin 325 MG tablet March 22, 2017 - Patient has been holding this medication in preparation for surgery.  Not taken since December 2016.  Plans to resume after surgery in April, will continue to hold for now.  Previously was on 325 mg by mouth daily.       atenolol (TENORMIN) 50 MG tablet Take 50 mg by mouth every morning        chlorthalidone (HYGROTON) 25 MG tablet Take 25 mg by mouth every morning        famotidine (PEPCID) 40 MG tablet Take 40 mg by mouth 2 times daily        insulin aspart (NOVOLOG PEN) 100 UNIT/ML injection Inject Subcutaneous 4 times daily (with meals and nightly) Takes base of 12 units +SSI at breakfast   Takes base of 12 units +SSI at lunch  Takes base of 14 units +SSI at  supper  Takes base of 12 units +SSI at bedtime       insulin glargine (LANTUS) 100 UNIT/ML injection Inject 45 Units Subcutaneous At Bedtime        isosorbide mononitrate (IMDUR) 60 MG 24 hr tablet Take 60 mg by mouth every morning        losartan (COZAAR) 25 MG tablet Take 25 mg by mouth every morning        metFORMIN (GLUCOPHAGE) 500 MG tablet Take 1,000 mg by mouth 2 times daily (with meals)        potassium gluconate 2.5 MEQ TABS tablet Take 2.5 mEq by mouth every morning        simvastatin (ZOCOR) 40 MG tablet Take 40 mg by mouth At Bedtime        Multiple Vitamins-Minerals (MULTIVITAMIN ADULT) TABS Take 1 tablet by mouth every evening        NITROGLYCERIN SL Place 0.4 mg under the tongue every 5 minutes as needed for chest pain       ACETAMINOPHEN PO Take 500-1,000 mg by mouth every 8 hours as needed for pain         Allergies  No Known Allergies    Social History  Social History     Social History     Marital status:      Spouse name: Simin     Number of children: 6     Years of education: N/A     Occupational History           Social History Main Topics     Smoking status: Former Smoker     Packs/day: 1.50     Years: 8.00     Types: Cigarettes     Quit date: 1/1/1974     Smokeless tobacco: Never Used     Alcohol use No     Drug use: No     Sexual activity: Not on file     Other Topics Concern     Not on file     Social History Narrative       Family History  Family History   Problem Relation Age of Onset     DIABETES Mother      Bladder Cancer Father      Colon Cancer Father      DIABETES Brother      Coronary Artery Disease Brother      CABG and valve replacement     Stomach Cancer Maternal Grandmother      Heart Failure Maternal Grandfather      Heart Failure Paternal Grandmother      HEART DISEASE Brother      valve replacement     Stomach Cancer Paternal Grandfather                ROS/MED HX  The complete review of systems is negative other than noted in the HPI or here.     ENT/Pulmonary:   "   (+)VICTOR M risk factors snores loudly, hypertension, obese, tobacco use, Past use 1.5 packs/day  , . .    Neurologic:     (+)neuropathy - bilateral feet,     Cardiovascular: Comment: History of acute CHF     (+) Dyslipidemia, hypertension-range: 120/70's, -CAD, -past MI,CABG-date: 2004, stent,1997  3 . Taking blood thinners Pt has received instructions: Instructions Given to patient: stopped aspirin 4/21/2017.   METS/Exercise Tolerance:  1 - Eating, dressing   Hematologic:     (+) Anemia, History of Transfusion no previous transfusion reaction -     (-) history of blood clots   Musculoskeletal:  - neg musculoskeletal ROS       GI/Hepatic:     (+) GERD Asymptomatic on medication,       Renal/Genitourinary:     (+) chronic renal disease, type: CRI, Pt does not require dialysis, Pt has no history of transplant, Other Renal/ Genitourinary, left renal cancer s/p partial nephrectomy, bladder cancer, left ureter stricture      Endo:     (+) type II DM Last HgA1c: 7.6 date: 4/21/2017. Using insulin - not using insulin pump Normal glucose range: AM fasting- 120's,  non-fasting 250's not previously admitted for DM/DKA Diabetic complications: nephropathy neuropathy retinopathy cardiac problems, Obesity, .      Psychiatric:  - neg psychiatric ROS       Infectious Disease:  - neg infectious disease ROS       Malignancy:   (+) Malignancy History of Other  Other CA left renal, bladder Active status post Surgery         Other:    - neg other ROS         Temp: 98.4  F (36.9  C) Temp src: Oral BP: 154/78 Pulse: 90   Resp: 16 SpO2: 97 %         226 lbs 0 oz  5' 9\"   Body mass index is 33.37 kg/(m^2).       Physical Exam  Constitutional: Awake, alert, cooperative, no apparent distress, and appears stated age. obese  Eyes: Pupils equal, round and reactive to light, extra ocular muscles intact, sclera clear, conjunctiva normal.  HENT: Normocephalic, oral pharynx with moist mucus membranes. good dentition despite some missing teeth. No " goiter appreciated.   Respiratory: Clear to auscultation bilaterally, no crackles or wheezing.  Cardiovascular: Regular rate and rhythm, normal S1 and S2, and no murmur noted.  Carotids +2, no bruits. No edema. Palpable pulses to radial  DP and PT arteries.   GI: Normal bowel sounds, soft, non-distended, non-tender, no masses palpated, no hepatosplenomegaly.  Surgical scars: left flank healing well.  LLQ surgical drain with light pink drainage.   Lymph/Hematologic: No cervical lymphadenopathy and no supraclavicular lymphadenopathy.  Genitourinary:  deferred  Skin: Warm and dry.   Musculoskeletal: Full ROM of neck. There is no redness, warmth, or swelling of the exoposed joints. Gross motor strength is normal.    Neurologic: Awake, alert, oriented to name, place and time. Cranial nerves II-XII are grossly intact. Gait is normal.   Neuropsychiatric: Calm, cooperative. Normal affect.     Labs: (personally reviewed)  Component      Latest Ref Rng & Units 4/21/2017   Color Urine       Yellow   Appearance Urine       Slightly Cloudy   Glucose Urine      NEG mg/dL Negative   Bilirubin Urine      NEG Negative   Ketones Urine      NEG mg/dL Negative   Specific Gravity Urine      1.003 - 1.035 1.023   Blood Urine      NEG Moderate (A)   pH Urine      5.0 - 7.0 pH 5.0   Protein Albumin Urine      NEG mg/dL 30 (A)   Urobilinogen mg/dL      0.0 - 2.0 mg/dL 0.0   Nitrite Urine      NEG Negative   Leukocyte Esterase Urine      NEG Large (A)   Source       Midstream Urine   WBC Urine      0 - 2 / (H)   RBC Urine      0 - 2 /HPF 58 (H)   Bacteria Urine      NEG /HPF Few (A)   Squamous Epithelial /HPF Urine      0 - 1 /HPF <1   Mucous Urine      NEG /LPF Present (A)   Sodium      133 - 144 mmol/L 136   Potassium      3.4 - 5.3 mmol/L 4.0   Chloride      94 - 109 mmol/L 98   Carbon Dioxide      20 - 32 mmol/L 30   Anion Gap      3 - 14 mmol/L 8   Glucose      70 - 99 mg/dL 116 (H)   Urea Nitrogen      7 - 30 mg/dL 38 (H)    Creatinine      0.66 - 1.25 mg/dL 1.34 (H)   GFR Estimate      >60 mL/min/1.7m2 53 (L)   GFR Estimate If Black      >60 mL/min/1.7m2 64   Calcium      8.5 - 10.1 mg/dL 9.0   WBC      4.0 - 11.0 10e9/L 8.5   RBC Count      4.4 - 5.9 10e12/L 3.62 (L)   Hemoglobin      13.3 - 17.7 g/dL 10.4 (L)   Hematocrit      40.0 - 53.0 % 32.3 (L)   MCV      78 - 100 fl 89   MCH      26.5 - 33.0 pg 28.7   MCHC      31.5 - 36.5 g/dL 32.2   RDW      10.0 - 15.0 % 13.5   Platelet Count      150 - 450 10e9/L 520 (H)   Hemoglobin A1C      4.3 - 6.0 % 7.6 (H)       EK2017 sinus rhythm, 1st degree AVB, RBBB, inferior infarct    Cardiac echo:     CONCLUSION:  1. Normal LV size.  Ejection fraction is approximately 45-50%. Inferolateral and inferior hypokinesis. Diastolic function is stage I.    2. No AS. No aortic insufficiency.     3. Mild MR.    4. Mild pulmonic insufficiency.     5. Mild TR.     6. No pericardial effusion.     7. Left atrial enlargement.     8. No previous echocardiogram for comparison.     Stress test:   Interpretation Summary  Technically difficult study limited by baseline wall motion abnormalities and  difficult acoustic windows.  High risk exercise stress echocardiogram.  Inferior akinesis at rest and peak exercise with associated mid-anterolateral  hypokinesis at peak exercise.  Mild to moderately reduced LV systolic function at rest. The LVEF is 40-45%  with minimal increase to 45-50% with exercise.  Normal blood pressure response to exercise.  EKG demonstrates baseline RBBB and Q waves inferior and inferolaterally. No  EKG evidence of ischemia at peak exercise.  No subjective evidence of ischemia at rest and at peak exercise.  No significant valvular disease noted on routine screening color flow Doppler  and pulsed Doppler examination.  In aggregate, these findings are c/w extensive prior myocardial infarction in  the inferior and lateral myocardial wall with perinfarct inducible ischemia in  the  lateral myocardial segment.      ASSESSMENT and PLAN  Larry Wegener is a67 year old male scheduled for a cystoscopy, left stent placement on 4/27/2017 by Dr. Mitchell in treatment of a left ureteral stricture.  PAC referral for risk assessment and optimization for anesthesia with comorbid conditions of:  CAD, hypertension, hyperlipidemia, old MI, s/p CABG, s/p coronary stents, history of smoking, anemia, diabetic neuropathy, diabetic retinopathy, diabetes, GERD, CKD, bladder cancer and left renal cancer.    Pre-operative considerations:  1.  Cardiac:  Functional status- METS 1.  Patient hasn't been able to do much physical activity due to fatigue since his partial nephrectomy on 4/5/2017.  He is only walking about a 1/2 block so far.  He denies any exertional dyspnea with that or his normal daily activities.  He has multiple cardiac co-morbidities, but hasn't followed with a cardiologist in years.  He has been encouraged to follow up with one in his home area.  A stress echo was completed here prior to his recent surgery; no acute signs of ischemia. Instructed to hold his losartan and chlorthalidone DOS.   Low risk surgery with 6.6% risk of major adverse cardiac event.  No further cardiac evaluation needed per 2014 ACC/AHA guidelines for non-cardiac surgery.  2.  Pulm:  Airway feasible.  VICTOR M risk:  High.  Quit smoking in 1974.  3.  GI:  Risk of PONV score = 1.  If > 2, anti-emetic intervention recommended. GERD is well controlled with pepcid.  4. Endo:  Diabetes is managed with lantus, novolog, and metformin.  Instructed to hold the metformin and novolog the DOS and to take only 80% of his lantus the night before surgery.  5. Heme/onc:  Stopped his aspirin 4/20/2017.  Following with heme/onc in [place], [place] for bladder cancer and s/p partial nephrectomy for left renal cancer; plans to start BCG treatment there soon.  LLQ surgical drain still intact from nephrectomy procedure and continues to have drainage.  VTE risk  = 4.5% - consider DVT prophylaxis.  Anemia noted with Hgb level at 10.4.        Patient is optimized and is acceptable candidate for the proposed procedure.  No further diagnostic evaluation is needed.     Patient also evaluated by Dr. Castro.           Kanwal Malagon DNP, RN, APRN  Preoperative Assessment Center  [facility]  Phone: [phone]  Fax: [phone]

## 2017-04-21 NOTE — ANESTHESIA PREPROCEDURE EVALUATION
Anesthesia Evaluation     . Pt has had prior anesthetic. Type: General and MAC    No history of anesthetic complications          ROS/MED HX    ENT/Pulmonary:     (+)VICTOR M risk factors snores loudly, hypertension, obese, tobacco use, Past use 1.5 packs/day  , . .    Neurologic:     (+)neuropathy - bilateral feet,     Cardiovascular: Comment: History of acute CHF     (+) Dyslipidemia, hypertension-range: 120/70's, -CAD, -past MI,CABG-date: 2004, stent,1997  3 . Taking blood thinners Pt has received instructions: Instructions Given to patient: stopped aspirin 4/21/2017. . . :. . Previous cardiac testing Echodate:7/10/2015results:CONCLUSION:  1. Normal LV size. Ejection fraction is approximately 45-50%. Inferolateral and inferior hypokinesis. Diastolic function is stage I.    2. No AS. No aortic insufficiency.     3. Mild MR.    4. Mild pulmonic insufficiency.     5. Mild TR.     6. No pericardial effusion.     7. Left atrial enlargement.     8. No previous echocardiogram for comparison. Stress Testdate:4/4/2017 results:Interpretation Summary  Technically difficult study limited by baseline wall motion abnormalities and  difficult acoustic windows.  High risk exercise stress echocardiogram.  Inferior akinesis at rest and peak exercise with associated mid-anterolateral  hypokinesis at peak exercise.  Mild to moderately reduced LV systolic function at rest. The LVEF is 40-45%  with minimal increase to 45-50% with exercise.  Normal blood pressure response to exercise.  EKG demonstrates baseline RBBB and Q waves inferior and inferolaterally. No  EKG evidence of ischemia at peak exercise.  No subjective evidence of ischemia at rest and at peak exercise.  No significant valvular disease noted on routine screening color flow Doppler  and pulsed Doppler examination.  In aggregate, these findings are c/w extensive prior myocardial infarction in  the inferior and lateral myocardial wall with perinfarct inducible ischemia in  the  lateral myocardial segment.ECG reviewed date:4/6/2017 results:Sinus rhythm, 1st degree AVB, RBBB, inferior infarct date: results:          METS/Exercise Tolerance:  1 - Eating, dressing   Hematologic:     (+) Anemia, History of Transfusion no previous transfusion reaction -     (-) history of blood clots   Musculoskeletal:  - neg musculoskeletal ROS       GI/Hepatic:     (+) GERD Asymptomatic on medication,       Renal/Genitourinary:     (+) chronic renal disease, type: CRI, Pt does not require dialysis, Pt has no history of transplant, Other Renal/ Genitourinary, left renal cancer s/p partial nephrectomy, bladder cancer, left ureter stricture      Endo:     (+) type II DM Last HgA1c: 7.6 date: 4/21/2017 Using insulin - not using insulin pump Normal glucose range: AM fasting- 120's,  non-fasting 250's not previously admitted for DM/DKA Diabetic complications: nephropathy neuropathy retinopathy cardiac problems, Obesity, .      Psychiatric:  - neg psychiatric ROS       Infectious Disease:  - neg infectious disease ROS       Malignancy:   (+) Malignancy History of Other  Other CA left renal, bladder Active status post Surgery         Other:    - neg other ROS                 Physical Exam  Normal systems: cardiovascular and pulmonary    Airway   Mallampati: II  TM distance: >3 FB  Neck ROM: full    Dental   (+) missing  Comment: Multiple missing teeth    Cardiovascular   Rhythm and rate: regular and normal      Pulmonary    breath sounds clear to auscultation    Other findings: Obese  LLQ surgical drain with pink drainage           PAC Discussion and Assessment    ASA Classification: 3  Case is suitable for: ASC  Anesthetic techniques and relevant risks discussed: MAC with GA as backup  Invasive monitoring and risk discussed: No  Types:   Possibility and Risk of blood transfusion discussed: No  NPO instructions given:   Additional anesthetic preparation and risks discussed:   Needs early admission to pre-op area:    Other:     PAC Resident/NP Anesthesia Assessment:  Larry Wegener is a67 year old male scheduled for a cystoscopy, left stent placement on 4/27/2017 by Dr. Mitchell in treatment of a left ureteral stricture.  PAC referral for risk assessment and optimization for anesthesia with comorbid conditions of:  CAD, hypertension, hyperlipidemia, old MI, s/p CABG, s/p coronary stents, history of smoking, anemia, diabetic neuropathy, diabetic retinopathy, diabetes, GERD, CKD, bladder cancer and left renal cancer.    Pre-operative considerations:  1.  Cardiac:  Functional status- METS 1.  Patient hasn't been able to do much physical activity due to fatigue since his partial nephrectomy on 4/5/2017.  He is only walking about a 1/2 block so far.  He denies any exertional dyspnea with that or his normal daily activities.  He has multiple cardiac co-morbidities, but hasn't followed with a cardiologist in years.  He has been encouraged to follow up with one in his home area.  A stress echo was completed here prior to his recent surgery; no acute signs of ischemia. Instructed to hold his losartan and chlorthalidone DOS.   Low risk surgery with 6.6% risk of major adverse cardiac event.  No further cardiac evaluation needed per 2014 ACC/AHA guidelines for non-cardiac surgery.  2.  Pulm:  Airway feasible.  VICTOR M risk:  High.  Quit smoking in 1974.  3.  GI:  Risk of PONV score = 1.  If > 2, anti-emetic intervention recommended. GERD is well controlled with pepcid.  4. Endo:  Diabetes is managed with lantus, novolog, and metformin.  Instructed to hold the metformin and novolog the DOS and to take only 80% of his lantus the night before surgery.  5. Heme/onc:  Stopped his aspirin 4/20/2017.  Following with heme/onc in [place], [place] for bladder cancer and s/p partial nephrectomy for left renal cancer; plans to start BCG treatment there soon.  LLQ surgical drain still intact from nephrectomy procedure and continues to have drainage.  VTE risk =  4.5% - consider DVT prophylaxis. Anemia noted with Hgb level at 10.4.         Patient is optimized and is acceptable candidate for the proposed procedure.  No further diagnostic evaluation is needed.     Patient also evaluated by Dr. Castro. See recommendations below.     For further details of assessment, testing, and physical exam please see H and P completed on same date.          Kanwal Malagon DNP, RN, APRN      Reviewed and Signed by PAC Mid-Level Provider/Resident  Mid-Level Provider/Resident: Kanwal Malagon DNP, RN, APRN  Date: 4/21/2017  Time: 1659    Attending Anesthesiologist Anesthesia Assessment:  67 year old for cystoscopy, left stent placement. Chart reviewed, patient seen and evaluated; agree with above assessment. Patient has known cardiac disease with prior CABG and stents, recent stress test showed no inducible ischemia. Diabetic, with neuropathy, retinopathy, CKD and prior partial nephrectomy for renal cancer.     Patient is appropriate for the planned procedure without further workup or medical management change. The final anesthesia plan will be determined by the physician anesthesiologist caring for the patient on the day of surgery.      Reviewed and Signed by PAC Anesthesiologist  Anesthesiologist: anderson  Date: 4/21/2017  Time:   Pass/Fail: Pass  Disposition:     PAC Pharmacist Assessment:        Pharmacist:   Date:   Time:                           .

## 2017-04-21 NOTE — MR AVS SNAPSHOT
After Visit Summary   4/21/2017    Larry Wegener    MRN: [id number]           Patient Information     Date Of Birth          1950        Visit Information        Provider Department      4/21/2017 1:00 PM Joe Mitchell MD [facility]        Today's Diagnoses     Malignant neoplasm of left kidney (H)    -  1      Care Instructions    Schedule surgery with Dr. Mitchell.    It was a pleasure meeting with you today.  Thank you for allowing me and my team the privilege of caring for you today.  YOU are the reason we are here, and I truly hope we provided you with the excellent service you deserve.  Please let us know if there is anything else we can do for you so that we can be sure you are leaving completely satisfied with your care experience.      DIPIKA Avilez        Follow-ups after your visit        Who to contact     Please call your clinic at [phone] to:    Ask questions about your health    Make or cancel appointments    Discuss your medicines    Learn about your test results    Speak to your doctor   If you have compliments or concerns about an experience at your clinic, or if you wish to file a complaint, please contact [facility] Patient Relations at [phone] or email us at [email or web address]         Additional Information About Your Visit        MyChart Information     Antenna gives you secure access to your electronic health record. If you see a primary care provider, you can also send messages to your care team and make appointments. If you have questions, please call your primary care clinic.  If you do not have a primary care provider, please call [phone] and they will assist you.      Antenna is an electronic gateway that provides easy, online access to your medical records. With [employer], you can request a clinic appointment, read your test results, renew a  "prescription or communicate with your care team.     To access your existing account, please contact your [facility] or call [phone] for assistance.        Care EveryWhere ID     This is your Care EveryWhere ID. This could be used by other organizations to access your [facility] medical records  [id number]        Your Vitals Were     Pulse Height BMI (Body Mass Index)             90 1.753 m (5' 9\") 33.37 kg/m2          Blood Pressure from Last 3 Encounters:   04/27/17 138/78   04/21/17 154/78   04/21/17 154/78    Weight from Last 3 Encounters:   04/27/17 100.7 kg (222 lb 0.1 oz)   04/21/17 102.5 kg (226 lb)   04/21/17 102.5 kg (226 lb)              We Performed the Following     Becky-Operative Worksheet  (Urology General)     Urine Culture Aerobic Bacterial          Today's Medication Changes          These changes are accurate as of: 4/21/17 11:59 PM.  If you have any questions, ask your nurse or doctor.               Start taking these medicines.        Dose/Directions    ciprofloxacin 250 MG tablet   Commonly known as:  CIPRO   Used for:  Malignant neoplasm of left kidney (H)   Started by:  Joe Mitchell MD        Dose:  250 mg   Take 1 tablet (250 mg) by mouth 2 times daily for 7 days   Quantity:  14 tablet   Refills:  0         These medicines have changed or have updated prescriptions.        Dose/Directions    senna-docusate 8.6-50 MG per tablet   Commonly known as:  SENOKOT-S;PERICOLACE   This may have changed:    - how much to take  - when to take this   Used for:  Renal cancer, unspecified laterality (H)        Dose:  1-2 tablet   Take 1-2 tablets by mouth 2 times daily   Quantity:  100 tablet   Refills:  0            Where to get your medicines      These medications were sent to [facility] [place], [place] - [address] [facility] [address] [place] [address] 1-[facility], [facility] [address]    Hours:  TRANSPLANT PHONE NUMBER [phone] " Phone:  [phone]     ciprofloxacin 250 MG tablet                Primary Care Provider Office Phone # Fax #    Tino Pate [phone]       [facility] [address] [facility] [address]        Thank you!     Thank you for choosing [facility] UROLOGY AND [facility] PROST[facility] UROLOGIC CANCERS  for your care. Our goal is always to provide you with excellent care. Hearing back from our patients is one way we can continue to improve our services. Please take a few minutes to complete the written survey that you may receive in the mail after your visit with us. Thank you!             Your Updated Medication List - Protect others around you: Learn how to safely use, store and throw away your medicines at www.disposemymeds.org.          This list is accurate as of: 4/21/17 11:59 PM.  Always use your most recent med list.                   Brand Name Dispense Instructions for use    ACETAMINOPHEN PO      Take 500-1,000 mg by mouth every 8 hours as needed for pain       aspirin 325 MG tablet      March 22, 2017 - Patient has been holding this medication in preparation for surgery.  Not taken since December 2016.  Plans to resume after surgery in April, will continue to hold for now.  Previously was on 325 mg by mouth daily.       atenolol 50 MG tablet    TENORMIN     Take 50 mg by mouth every morning       chlorthalidone 25 MG tablet    HYGROTON     Take 25 mg by mouth every morning       ciprofloxacin 250 MG tablet    CIPRO    14 tablet    Take 1 tablet (250 mg) by mouth 2 times daily for 7 days       famotidine 40 MG tablet    PEPCID     Take 40 mg by mouth 2 times daily       HYDROcodone-acetaminophen 5-325 MG per tablet    NORCO    30 tablet    Take 1-2 tablets by mouth every 4 hours as needed for moderate to severe pain       insulin aspart 100 UNIT/ML injection    NovoLOG PEN     Inject Subcutaneous 4 times daily (with meals and nightly) Takes base of 12 units +SSI at breakfast  Takes  base of 12 units +SSI at lunch Takes base of 14 units +SSI at supper Takes base of 12 units +SSI at bedtime       insulin glargine 100 UNIT/ML injection    LANTUS     Inject 45 Units Subcutaneous At Bedtime       isosorbide mononitrate 60 MG 24 hr tablet    IMDUR     Take 60 mg by mouth every morning       losartan 25 MG tablet    COZAAR     Take 25 mg by mouth every morning       metFORMIN 500 MG tablet    GLUCOPHAGE     Take 1,000 mg by mouth 2 times daily (with meals)       MULTIVITAMIN ADULT Tabs      Take 1 tablet by mouth every evening       NITROGLYCERIN SL      Place 0.4 mg under the tongue every 5 minutes as needed for chest pain       order for DME     1 each    Equipment being ordered: Walker () Treatment Diagnosis: Limited mobility       potassium gluconate 2.5 MEQ Tabs tablet      Take 2.5 mEq by mouth every morning       senna-docusate 8.6-50 MG per tablet    SENOKOT-S;PERICOLACE    100 tablet    Take 1-2 tablets by mouth 2 times daily       simvastatin 40 MG tablet    ZOCOR     Take 40 mg by mouth At Bedtime

## 2017-04-21 NOTE — MR AVS SNAPSHOT
After Visit Summary   2017    Larry Wegener    MRN: [id number]           Patient Information     Date Of Birth          1950        Visit Information        Provider Department      2017 4:00 PM 8, [facility]        Today's Diagnoses     Preop examination    -  1    Malignant neoplasm of left kidney (H)          Care Instructions    Preparing for Your Surgery      Name:  Larry Wegener   MRN:  [id number]   :  1950   Today's Date:  2017     Arriving for surgery:  Surgery date:  17  Surgery time:  1000  Arrival time:  0830  Please come to:     [facility]  [address] [place] [id number]    -  Proceed to the 5th floor to check into the Ambulatory Surgery Center.              >> There will be patient concierges on the 1st and 5th floor, for assistance or an escort, if you would like.              >> Please call [phone] with any questions.    What can I eat or drink?  -  You may have solid food or milk products until 8 hours prior to your surgery.  -  You may have water, apple juice or 7up/Sprite until 2 hours prior to your surgery.    Which medicines can I take?  -  Do NOT take these medications in the morning, the day of surgery:  Aspirin x 7 days before surgery, ibuprofen x 2 days before surgery, supplements  Day of surgery.    -  Please take these medications the day of surgery:  Tylenol, atenolol, isosorbide, famotidine, sliding scale, night before surgery 36 units lantus.    How do I prepare myself?  -  Take two showers: one the night before surgery; and one the morning of surgery.         Use Scrubcare or Hibiclens to wash from neck down.  You may use your own shampoo and conditioner. No other hair products.   -  Do NOT use lotion, powder, deodorant, or antiperspirant the day of your surgery.  -  Do NOT wear any makeup, fingernail polish or jewelry.  -  Begin using Incentive Spirometer 1  week prior to surgery.  Use 4 times per day, up to 5-10 breaths each time.  Bring Incentive Spirometer to hospital.  -Do not bring your own medications to the hospital, except for inhalers and eye drops.  -  Bring your ID and insurance card.    Questions or Concerns:  If you have questions or concerns, please call the  Preoperative Assessment Center, Monday-Friday 7AM-7PM:  [phone]                  Follow-ups after your visit        Your next 10 appointments already scheduled     Apr 21, 2017  5:00 PM CDT   LAB with  LAB   [facility] ([facility])    [address] [place] [address]  1st [facility] [address] [phone]           Patient must bring picture ID.  Patient should be prepared to give a urine specimen  Please do not eat 10-12 hours before your appointment if you are coming in fasting for labs on lipids, cholesterol, or glucose (sugar).  Pregnant women should follow their Care Team instructions. Water with medications is okay. Do not drink coffee or other fluids.   If you have concerns about taking  your medications, please ask at office or if scheduling via [employer], send a message by clicking on Secure Messaging, Message Your Care Team.            Apr 27, 2017   Procedure with Joe Mitchell MD   [facility] ([facility] [place])    [address] [place] [address]  5th [facility] [address] [phone]           Located in the Clinics and Surgery Center at [address] [place] [address].   parking is very convenient and highly recommended.  is a $6 flat rate fee.  Both  and self parkers should enter the main arrival plaza from [facility]; parking attendants will direct you based on your parking preference.              Future tests that were ordered for you today     Open Future Orders        Priority Expected Expires Ordered    Hemoglobin A1c Routine 4/21/2017 5/21/2017 4/21/2017    Basic  "metabolic panel Routine 4/21/2017 5/21/2017 4/21/2017    CBC with platelets Routine 4/21/2017 5/21/2017 4/21/2017    UA with Microscopic reflex to Culture Routine  4/22/2018 4/21/2017            Who to contact     Please call your clinic at [phone] to:    Ask questions about your health    Make or cancel appointments    Discuss your medicines    Learn about your test results    Speak to your doctor   If you have compliments or concerns about an experience at your clinic, or if you wish to file a complaint, please contact [facility] Patient Relations at [phone] or email us at [email or web address]         Additional Information About Your Visit        My[employer] Information     [employer] gives you secure access to your electronic health record. If you see a primary care provider, you can also send messages to your care team and make appointments. If you have questions, please call your primary care clinic.  If you do not have a primary care provider, please call [phone] and they will assist you.      [employer] is an electronic gateway that provides easy, online access to your medical records. With [employer], you can request a clinic appointment, read your test results, renew a prescription or communicate with your care team.     To access your existing account, please contact your [facility] or call [phone] for assistance.        Care EveryWhere ID     This is your Care EveryWhere ID. This could be used by other organizations to access your [facility] medical records  [id number]        Your Vitals Were     Pulse Temperature Respirations Height Pulse Oximetry BMI (Body Mass Index)    90 98.4  F (36.9  C) (Oral) 16 1.753 m (5' 9\") 97% 33.37 kg/m2       Blood Pressure from Last 3 Encounters:   04/21/17 154/78   04/21/17 154/78   04/10/17 130/71    Weight from Last 3 Encounters:   04/21/17 102.5 kg (226 lb)   04/21/17 102.5 kg (226 lb) "   04/10/17 107.5 kg (236 lb 14.4 oz)              We Performed the Following     UA with Microscopic reflex to Culture          Today's Medication Changes          These changes are accurate as of: 4/21/17  4:58 PM.  If you have any questions, ask your nurse or doctor.               Start taking these medicines.        Dose/Directions    ciprofloxacin 250 MG tablet   Commonly known as:  CIPRO   Used for:  Malignant neoplasm of left kidney (H)   Started by:  Joe Mitchell MD        Dose:  250 mg   Take 1 tablet (250 mg) by mouth 2 times daily for 7 days   Quantity:  14 tablet   Refills:  0         These medicines have changed or have updated prescriptions.        Dose/Directions    senna-docusate 8.6-50 MG per tablet   Commonly known as:  SENOKOT-S;PERICOLACE   This may have changed:    - how much to take  - when to take this   Used for:  Renal cancer, unspecified laterality (H)        Dose:  1-2 tablet   Take 1-2 tablets by mouth 2 times daily   Quantity:  100 tablet   Refills:  0            Where to get your medicines      These medications were sent to [address] [place] [address] [address] [address]    Hours:  TRANSPLANT PHONE NUMBER [phone] Phone:  [phone]     ciprofloxacin 250 MG tablet                Primary Care Provider Office Phone # Fax #    iTno Pate [phone]       [facility] [address] [facility] [address]        Thank you!     Thank you for choosing [facility]  for your care. Our goal is always to provide you with excellent care. Hearing back from our patients is one way we can continue to improve our services. Please take a few minutes to complete the written survey that you may receive in the mail after your visit with us. Thank you!             Your Updated Medication List - Protect others around you: Learn how to safely use, store  and throw away your medicines at www.disposemymeds.org.          This list is accurate as of: 4/21/17  4:58 PM.  Always use your most recent med list.                   Brand Name Dispense Instructions for use    ACETAMINOPHEN PO      Take 500-1,000 mg by mouth every 8 hours as needed for pain       aspirin 325 MG tablet      March 22, 2017 - Patient has been holding this medication in preparation for surgery.  Not taken since December 2016.  Plans to resume after surgery in April, will continue to hold for now.  Previously was on 325 mg by mouth daily.       atenolol 50 MG tablet    TENORMIN     Take 50 mg by mouth every morning       chlorthalidone 25 MG tablet    HYGROTON     Take 25 mg by mouth every morning       ciprofloxacin 250 MG tablet    CIPRO    14 tablet    Take 1 tablet (250 mg) by mouth 2 times daily for 7 days       famotidine 40 MG tablet    PEPCID     Take 40 mg by mouth 2 times daily       HYDROcodone-acetaminophen 5-325 MG per tablet    NORCO    30 tablet    Take 1-2 tablets by mouth every 4 hours as needed for moderate to severe pain       insulin aspart 100 UNIT/ML injection    NovoLOG PEN     Inject Subcutaneous 4 times daily (with meals and nightly) Takes base of 12 units +SSI at breakfast  Takes base of 12 units +SSI at lunch Takes base of 14 units +SSI at supper Takes base of 12 units +SSI at bedtime       insulin glargine 100 UNIT/ML injection    LANTUS     Inject 45 Units Subcutaneous At Bedtime       isosorbide mononitrate 60 MG 24 hr tablet    IMDUR     Take 60 mg by mouth every morning       losartan 25 MG tablet    COZAAR     Take 25 mg by mouth every morning       metFORMIN 500 MG tablet    GLUCOPHAGE     Take 1,000 mg by mouth 2 times daily (with meals)       MULTIVITAMIN ADULT Tabs      Take 1 tablet by mouth every evening       NITROGLYCERIN SL      Place 0.4 mg under the tongue every 5 minutes as needed for chest pain       order for DME     1 each    Equipment being ordered:  Fco () Treatment Diagnosis: Limited mobility       potassium gluconate 2.5 MEQ Tabs tablet      Take 2.5 mEq by mouth every morning       senna-docusate 8.6-50 MG per tablet    SENOKOT-S;PERICOLACE    100 tablet    Take 1-2 tablets by mouth 2 times daily       simvastatin 40 MG tablet    ZOCOR     Take 40 mg by mouth At Bedtime

## 2017-04-21 NOTE — LETTER
4/21/2017       RE: Larry Wegener  PO   [facility] [id number]     Dear Colleague,    Thank you for referring your patient, Larry Wegener, to the [facility] UROLOGY AND INST FOR PROSTATE AND UROLOGIC CANCERS at [facility]. Please see a copy of my visit note below.    ASSESSMENT AND PLAN  Stage T1a Renal Cell Carcinoma (clear cell subtype) status post left open partial nephrectomy on 4/5/17.  Margins were  Negative.    Persistent urine leak 300cc per day.  I will plan for stent next week.  I will give cipro today for prophylaxis.    I will schedule a follow-up appointment in 6 months with CT Abdomen/Pelvis with and without contrast,  chest xray and staging labs for surveillance of this patient's treated renal cell carcinoma.    After the visit in 6 months he should follow-up with Dr Mclaughlin for renal cell carcinoma and bladder cancer.    Surveillance Protocol For Renal Cell Carcinoma Following Nephrectomy and Partial nephrectomy (Margins Negative)  Stage 1    Abdominal CT 6-12 months after surgery.      Annual abdominal CT and Chest Xray for 3 years after surgery.    Follow-up after 3 years only as clinically indicated.    Stage 2 and 3 (High Risk)    CT Chest Abdomen and Pelvis with and without contrast at 3-6 months after surgery.     CT Chest Abdomen and Pelvis with and without contrast every 6 months for 3 years after surgery.    Annual CT Chest Abdomen and Pelvis with and without contrast until 5 years after surgery.    AUA Guideline: Renal Mass Follow-up for Clinically Localized Renal Neoplasms  _________________________________________________________      CHIEF COMPLAINT   Larry Wegener  is a 67 year old male here for a post operative visit following open partial nephrectomy.    HPI  Overall his postoperative has been going well.  The main exception to this is his persistent urine leak.  He is still have 300cc per day from the drain.    Pathology  Collected: 4/5/2017    Received: 4/5/2017   Reported: 4/11/2017 14:47   Ordering Phy(s): RYAN OLIVER     For improved result formatting, select 'View Enhanced Report Format'   under Linked Documents section.     ORIGINAL REPORT:     SPECIMEN(S):   A: Becky-nephric fat   B: Becky-tumor fat   C: Left kidney mass   D: Left kidney tumor base #1   E: Left kidney mass tumor base #2   F: Left kidney mass tumor base #3     FINAL DIAGNOSIS:   A. Becky-nephric fat, excision:   - Fibroadipose tissue, negative for carcinoma     B. Becky-tumor fat, excision:   - Fibroadipose tissue, negative for carcinoma     C. Left kidney mass, partial nephrectomy:   - Clear cell renal cell carcinoma   - ISUP tumor stgstrstastdstest:st st1st of 4   - Tumor size (maximum dimension): 3.4 cm   - Lymphovascular invasion: Not identified   - Perirenal fat invasion: Not identified   - Renal sinus invasion: Not identified   - Sarcomatoid features and necrosis: Not identified   - Margins of resection: Free of tumor   - Pathological staging (pTNM):pT1a     D. Left kidney tumor base #1, biopsy:   - Renal parenchyma, negative for carcinoma     E. Left kidney mass tumor base #2, biopsy:   - Renal parenchyma, negative for carcinoma     F. Left kidney mass tumor base #3, biopsy:   - Renal parenchyma, negative for carcinoma     Patient Active Problem List    Diagnosis Date Noted     Renal cancer (H) 04/05/2017     Priority: Medium     Past Medical History:   Diagnosis Date     CKD (chronic kidney disease)      DM II (diabetes mellitus, type II), controlled (H)      GERD (gastroesophageal reflux disease)      HTN (hypertension)      Left renal mass      Neuropathy (H)      Past Surgical History:   Procedure Laterality Date     ANGIOGRAM  1997    stent     CORONARY ARTERY BYPASS  2004     CYSTOSCOPY, BIOPSY BLADDER INSTILL OPTICAL AGENT       NEPHRECTOMY PARTIAL Left 4/5/2017    Procedure: NEPHRECTOMY PARTIAL;  Surgeon: Ryan Oliver MD;  Location: [facility] OR     TONSILLECTOMY       Current  Outpatient Prescriptions   Medication Sig Dispense Refill     order for DME Equipment being ordered: Walker ()  Treatment Diagnosis: Limited mobility 1 each 0     HYDROcodone-acetaminophen (NORCO) 5-325 MG per tablet Take 1-2 tablets by mouth every 4 hours as needed for moderate to severe pain 30 tablet 0     senna-docusate (SENOKOT-S;PERICOLACE) 8.6-50 MG per tablet Take 1-2 tablets by mouth 2 times daily 100 tablet 0     aspirin 325 MG tablet March 22, 2017 - Patient has been holding this medication in preparation for surgery.  Not taken since December 2016.  Plans to resume after surgery in April, will continue to hold for now.  Previously was on 325 mg by mouth daily.       atenolol (TENORMIN) 50 MG tablet Take 50 mg by mouth every morning        chlorthalidone (HYGROTON) 25 MG tablet Take 25 mg by mouth every morning        famotidine (PEPCID) 40 MG tablet Take 40 mg by mouth 2 times daily        insulin aspart (NOVOLOG PEN) 100 UNIT/ML injection Inject Subcutaneous 4 times daily (with meals and nightly) Takes base of 12 units +SSI at breakfast   Takes base of 12 units +SSI at lunch  Takes base of 14 units +SSI at supper  Takes base of 12 units +SSI at bedtime       insulin glargine (LANTUS) 100 UNIT/ML injection Inject 45 Units Subcutaneous At Bedtime        isosorbide mononitrate (IMDUR) 60 MG 24 hr tablet Take 60 mg by mouth every morning        losartan (COZAAR) 25 MG tablet Take 25 mg by mouth every morning        metFORMIN (GLUCOPHAGE) 500 MG tablet Take 1,000 mg by mouth 2 times daily (with meals)        potassium gluconate 2.5 MEQ TABS tablet Take 2.5 mEq by mouth every morning        simvastatin (ZOCOR) 40 MG tablet Take 40 mg by mouth At Bedtime        Multiple Vitamins-Minerals (MULTIVITAMIN ADULT) TABS Take 1 tablet by mouth every evening        Omega-3 Fatty Acids (FISH OIL PO) March 22, 2017 - Patient has been holding this medication in preparation for surgery.  Not taken since December 2016.  " Plans to resume after surgery in April, will continue to hold for now.  Previously was on fish oil 1000 mg by mouth twice daily.       NITROGLYCERIN SL Place 0.4 mg under the tongue every 5 minutes as needed for chest pain       oxymetazoline (AFRIN) 0.05 % spray Spray 2 sprays into both nostrils 2 times daily as needed for congestion       ACETAMINOPHEN PO Take 500-1,000 mg by mouth every 8 hours as needed for pain        SOCIAL HISTORY: He  reports that he has never smoked. He has never used smokeless tobacco. He reports that he does not drink alcohol or use illicit drugs.    PHYSICAL EXAM  /78  Pulse 90  Ht 1.753 m (5' 9\")  Wt 102.5 kg (226 lb)  BMI 33.37 kg/m2  Constitutional: Alert, no acute distress  Psychiatric: Normal mood and affect  Abdomen: Abdomen soft, non-tender. No masses, organomegaly, or lymphadenopathy.   Surgical sites are healing well.  Drain output is milky.    Recent Labs   Lab Test  04/09/17   0736  04/08/17   0729  04/07/17   0518  04/06/17   0655   WBC  6.9  8.2  10.2  10.1   HGB  8.4*  8.7*  9.9*  10.8*   HCT  25.0*  26.5*  29.5*  32.6*   PLT  176  136*  152  175     Recent Labs   Lab Test  04/10/17   0712  04/09/17   0736  04/08/17   0729  04/07/17   0518   NA  137  130*  128*  131*   POTASSIUM  3.3*  3.4  4.0  3.9   CHLORIDE  99  95  94  94   CO2  30  27  27  28   ANIONGAP  8  8  7  8   GLC  112*  189*  195*  166*   BUN  22  26  34*  40*   CR  1.42*  1.45*  1.73*  1.75*   GFRESTIMATED  49*  48*  39*  39*   GFRESTBLACK  60*  58*  48*  47*   FORREST  7.6*  7.8*  7.6*  7.7*     Recent Labs   Lab Test  03/22/17   1521   COLOR  Yellow   APPEARANCE  Clear   URINEGLC  Negative   URINEBILI  Negative   URINEKETONE  Negative   SG  1.016   UBLD  Negative   URINEPH  5.0   PROTEIN  Negative   NITRITE  Negative   LEUKEST  Negative       Serafin MARIE, reviewed these laboratory values.      CC  Patient Care Team:  Tino Pate as PCP - General (Family Practice)  Joe Mitchell MD " as MD (Urology)  Saida Mclaughlin as MD (Urology)  Irma Malagon, RN as Registered Nurse (Urology)  Self, Referred, MD as Referring Physician  ESTABLISHED PATIENT    Copy to patient  LARRY WEGENER     [facility] [id number]

## 2017-04-21 NOTE — NURSING NOTE
Pre Op Teaching Flowsheet       Pre and Post op Patient Education  Relevant Diagnosis:      Teaching Topic:  Pre and post op teaching  Person Involved in teaching: Larry Wegener    Motivation Level:  Asks Questions: Yes  Eager to Learn:  Yes  Cooperative: Yes  Receptive (willing/able to accept information):  Yes  Patient demonstrates understanding of the following:  Date and time of surgery:  04/27/2017  Location of surgery:  [facility]- 5th Floor  History and Physical and any other testing necessary prior to surgery: Yes  Required time line for completion of History and Physical and any pre-op testing: Yes    NPO Guidelines: Nothing to eat 8hrs prior, Nothing to drink 2hrs prior    Patient demonstrates understanding of the following:  Pre-op bowel prep:  Yes  Pre-op showering/scrub information with PCMX Soap: Yes  Medications to take the day of surgery:  Per PCP  Blood thinner medications discussed and when to stop (if applicable):  Yes  Diabetes medication management (if applicable):  N/A  Discussed pain control after surgery: pain medications  Infection Prevention: Patient demonstrates understanding of the following:  Patient instructed on hand hygiene:  Yes  Surgical procedure site care taught: Yes  Signs and symptoms of infection taught:  Yes  Wound care will be taught at the time of discharge.  Central venous catheter care will be taught at the time of discharge (if applicable).    Post-op follow-up:  Discussed how to contact the hospital, nurse, and clinic scheduling staff if necessary.    Instructional materials used/given/mailed:  [facility] Booklet, post op teaching sheet, Map, Soap, and arrival/location information.    Surgical instructions packet given to patient in office:  Yes  Irma Malagon RN  .

## 2017-04-21 NOTE — PATIENT INSTRUCTIONS
Preparing for Your Surgery      Name:  Larry Wegener   MRN:  [id number]   :  1950   Today's Date:  2017     Arriving for surgery:  Surgery date:  17  Surgery time:  1000  Arrival time:  0830  Please come to:     [facility]  [address] [place] [id number]    -  Proceed to the 5th floor to check into the Ambulatory Surgery Center.              >> There will be patient concierges on the 1st and 5th floor, for assistance or an escort, if you would like.              >> Please call [phone] with any questions.    What can I eat or drink?  -  You may have solid food or milk products until 8 hours prior to your surgery.  -  You may have water, apple juice or 7up/Sprite until 2 hours prior to your surgery.    Which medicines can I take?  -  Do NOT take these medications in the morning, the day of surgery:  Aspirin x 7 days before surgery, ibuprofen x 2 days before surgery, supplements  Day of surgery.    -  Please take these medications the day of surgery:  Tylenol, atenolol, isosorbide, famotidine, sliding scale, night before surgery 36 units lantus.    How do I prepare myself?  -  Take two showers: one the night before surgery; and one the morning of surgery.         Use Scrubcare or Hibiclens to wash from neck down.  You may use your own shampoo and conditioner. No other hair products.   -  Do NOT use lotion, powder, deodorant, or antiperspirant the day of your surgery.  -  Do NOT wear any makeup, fingernail polish or jewelry.  -  Begin using Incentive Spirometer 1 week prior to surgery.  Use 4 times per day, up to 5-10 breaths each time.  Bring Incentive Spirometer to hospital.  -Do not bring your own medications to the hospital, except for inhalers and eye drops.  -  Bring your ID and insurance card.    Questions or Concerns:  If you have questions or concerns, please call the  Preoperative Assessment Center, Monday-Friday 7AM-7PM:  [phone]

## 2017-04-21 NOTE — PROGRESS NOTES
ASSESSMENT AND PLAN  Stage T1a Renal Cell Carcinoma (clear cell subtype) status post left open partial nephrectomy on 4/5/17.  Margins were  Negative.    Persistent urine leak 300cc per day.  I will plan for stent next week.  I will give cipro today for prophylaxis.    I will schedule a follow-up appointment in 6 months with CT Abdomen/Pelvis with and without contrast,  chest xray and staging labs for surveillance of this patient's treated renal cell carcinoma.    After the visit in 6 months he should follow-up with Dr Mclaughlin for renal cell carcinoma and bladder cancer.    Surveillance Protocol For Renal Cell Carcinoma Following Nephrectomy and Partial nephrectomy (Margins Negative)  Stage 1    Abdominal CT 6-12 months after surgery.      Annual abdominal CT and Chest Xray for 3 years after surgery.    Follow-up after 3 years only as clinically indicated.    Stage 2 and 3 (High Risk)    CT Chest Abdomen and Pelvis with and without contrast at 3-6 months after surgery.     CT Chest Abdomen and Pelvis with and without contrast every 6 months for 3 years after surgery.    Annual CT Chest Abdomen and Pelvis with and without contrast until 5 years after surgery.    AUA Guideline: Renal Mass Follow-up for Clinically Localized Renal Neoplasms  _________________________________________________________      CHIEF COMPLAINT   Larry Wegener  is a 67 year old male here for a post operative visit following open partial nephrectomy.    HPI  Overall his postoperative has been going well.  The main exception to this is his persistent urine leak.  He is still have 300cc per day from the drain.    Pathology  Collected: 4/5/2017   Received: 4/5/2017   Reported: 4/11/2017 14:47   Ordering Phy(s): RYAN OLIVER     For improved result formatting, select 'View Enhanced Report Format'   under Linked Documents section.     ORIGINAL REPORT:     SPECIMEN(S):   A: Becky-nephric fat   B: Becky-tumor fat   C: Left kidney mass   D: Left  kidney tumor base #1   E: Left kidney mass tumor base #2   F: Left kidney mass tumor base #3     FINAL DIAGNOSIS:   A. Becky-nephric fat, excision:   - Fibroadipose tissue, negative for carcinoma     B. Becky-tumor fat, excision:   - Fibroadipose tissue, negative for carcinoma     C. Left kidney mass, partial nephrectomy:   - Clear cell renal cell carcinoma   - ISUP tumor stgstrstastdstest:st st1st of 4   - Tumor size (maximum dimension): 3.4 cm   - Lymphovascular invasion: Not identified   - Perirenal fat invasion: Not identified   - Renal sinus invasion: Not identified   - Sarcomatoid features and necrosis: Not identified   - Margins of resection: Free of tumor   - Pathological staging (pTNM):pT1a     D. Left kidney tumor base #1, biopsy:   - Renal parenchyma, negative for carcinoma     E. Left kidney mass tumor base #2, biopsy:   - Renal parenchyma, negative for carcinoma     F. Left kidney mass tumor base #3, biopsy:   - Renal parenchyma, negative for carcinoma     Patient Active Problem List    Diagnosis Date Noted     Renal cancer (H) 04/05/2017     Priority: Medium     Past Medical History:   Diagnosis Date     CKD (chronic kidney disease)      DM II (diabetes mellitus, type II), controlled (H)      GERD (gastroesophageal reflux disease)      HTN (hypertension)      Left renal mass      Neuropathy (H)      Past Surgical History:   Procedure Laterality Date     ANGIOGRAM  1997    stent     CORONARY ARTERY BYPASS  2004     CYSTOSCOPY, BIOPSY BLADDER INSTILL OPTICAL AGENT       NEPHRECTOMY PARTIAL Left 4/5/2017    Procedure: NEPHRECTOMY PARTIAL;  Surgeon: Joe Mitchell MD;  Location: [facility] OR     TONSILLECTOMY       Current Outpatient Prescriptions   Medication Sig Dispense Refill     order for DME Equipment being ordered: Walker ()  Treatment Diagnosis: Limited mobility 1 each 0     HYDROcodone-acetaminophen (NORCO) 5-325 MG per tablet Take 1-2 tablets by mouth every 4 hours as needed for moderate to severe pain 30  tablet 0     senna-docusate (SENOKOT-S;PERICOLACE) 8.6-50 MG per tablet Take 1-2 tablets by mouth 2 times daily 100 tablet 0     aspirin 325 MG tablet March 22, 2017 - Patient has been holding this medication in preparation for surgery.  Not taken since December 2016.  Plans to resume after surgery in April, will continue to hold for now.  Previously was on 325 mg by mouth daily.       atenolol (TENORMIN) 50 MG tablet Take 50 mg by mouth every morning        chlorthalidone (HYGROTON) 25 MG tablet Take 25 mg by mouth every morning        famotidine (PEPCID) 40 MG tablet Take 40 mg by mouth 2 times daily        insulin aspart (NOVOLOG PEN) 100 UNIT/ML injection Inject Subcutaneous 4 times daily (with meals and nightly) Takes base of 12 units +SSI at breakfast   Takes base of 12 units +SSI at lunch  Takes base of 14 units +SSI at supper  Takes base of 12 units +SSI at bedtime       insulin glargine (LANTUS) 100 UNIT/ML injection Inject 45 Units Subcutaneous At Bedtime        isosorbide mononitrate (IMDUR) 60 MG 24 hr tablet Take 60 mg by mouth every morning        losartan (COZAAR) 25 MG tablet Take 25 mg by mouth every morning        metFORMIN (GLUCOPHAGE) 500 MG tablet Take 1,000 mg by mouth 2 times daily (with meals)        potassium gluconate 2.5 MEQ TABS tablet Take 2.5 mEq by mouth every morning        simvastatin (ZOCOR) 40 MG tablet Take 40 mg by mouth At Bedtime        Multiple Vitamins-Minerals (MULTIVITAMIN ADULT) TABS Take 1 tablet by mouth every evening        Omega-3 Fatty Acids (FISH OIL PO) March 22, 2017 - Patient has been holding this medication in preparation for surgery.  Not taken since December 2016.  Plans to resume after surgery in April, will continue to hold for now.  Previously was on fish oil 1000 mg by mouth twice daily.       NITROGLYCERIN SL Place 0.4 mg under the tongue every 5 minutes as needed for chest pain       oxymetazoline (AFRIN) 0.05 % spray Spray 2 sprays into both nostrils 2  "times daily as needed for congestion       ACETAMINOPHEN PO Take 500-1,000 mg by mouth every 8 hours as needed for pain        SOCIAL HISTORY: He  reports that he has never smoked. He has never used smokeless tobacco. He reports that he does not drink alcohol or use illicit drugs.    PHYSICAL EXAM  /78  Pulse 90  Ht 1.753 m (5' 9\")  Wt 102.5 kg (226 lb)  BMI 33.37 kg/m2  Constitutional: Alert, no acute distress  Psychiatric: Normal mood and affect  Abdomen: Abdomen soft, non-tender. No masses, organomegaly, or lymphadenopathy.   Surgical sites are healing well.  Drain output is milky.    Recent Labs   Lab Test  04/09/17   0736  04/08/17   0729  04/07/17   0518  04/06/17   0655   WBC  6.9  8.2  10.2  10.1   HGB  8.4*  8.7*  9.9*  10.8*   HCT  25.0*  26.5*  29.5*  32.6*   PLT  176  136*  152  175     Recent Labs   Lab Test  04/10/17   0712  04/09/17   0736  04/08/17   0729  04/07/17   0518   NA  137  130*  128*  131*   POTASSIUM  3.3*  3.4  4.0  3.9   CHLORIDE  99  95  94  94   CO2  30  27  27  28   ANIONGAP  8  8  7  8   GLC  112*  189*  195*  166*   BUN  22  26  34*  40*   CR  1.42*  1.45*  1.73*  1.75*   GFRESTIMATED  49*  48*  39*  39*   GFRESTBLACK  60*  58*  48*  47*   FORREST  7.6*  7.8*  7.6*  7.7*     Recent Labs   Lab Test  03/22/17   1521   COLOR  Yellow   APPEARANCE  Clear   URINEGLC  Negative   URINEBILI  Negative   URINEKETONE  Negative   SG  1.016   UBLD  Negative   URINEPH  5.0   PROTEIN  Negative   NITRITE  Negative   LEUKEST  Negative       Serafin MARIE, reviewed these laboratory values.      CC  Patient Care Team:  Tino Pate as PCP - General (Family Practice)  Joe Mitchell MD as MD (Urology)  Saida Mclaughlin as MD (Urology)  Irma Malagon RN as Registered Nurse (Urology)  Self, MD Krishna as Referring Physician  ESTABLISHED PATIENT    Copy to patient  LARRY WEGENER  PO   [place] [phone]          "

## 2017-04-21 NOTE — MR AVS SNAPSHOT
After Visit Summary   4/21/2017    Larry Wegener    MRN: [id number]           Patient Information     Date Of Birth          1950        Visit Information        Provider Department      4/21/2017 2:15 PM Nurse, [facility]        Today's Diagnoses     Malignant neoplasm of left kidney (H)    -  1       Follow-ups after your visit        Follow-up notes from your care team     Return in about 3 months (around 7/21/2017).      Your next 10 appointments already scheduled     Apr 21, 2017  3:00 PM CDT   (Arrive by 2:45 PM)   PAC RN ASSESSMENT with [place] Pac Rn   [facility] ([facility])    [address] [place] [address]  [address] [facility] [address]   [phone]            Apr 21, 2017  3:50 PM CDT   (Arrive by 3:35 PM)   PAC Anesthesia Consult with  Pac Anesthesiologist   [facility] ([facility])    [address] [place] [address]  [address] [facility] [address]   [phone]            Apr 21, 2017  4:00 PM CDT   (Arrive by 3:45 PM)   PAC EVALUATION with  Pac Kmi 8   [facility] ([facility])    [address] [place] [address]  [address] [facility] [address]   [phone]            Apr 27, 2017   Procedure with Joe Mitchell MD   [facility] ([facility])    [address] [place] [address]  [address] [facility] [address]   [phone]           Located in the Clinics and Surgery Center at [address] [place] [address].   parking is very convenient and highly recommended.  is a $6 flat rate fee.  Both  and self parkers should enter the main arrival plaza from [facility]; parking attendants will direct you based on your parking preference.              Future tests that were ordered for you today      Open Future Orders        Priority Expected Expires Ordered    UA with Microscopic reflex to Culture Routine  4/22/2018 4/21/2017            Who to contact     Please call your clinic at [phone] to:    Ask questions about your health    Make or cancel appointments    Discuss your medicines    Learn about your test results    Speak to your doctor   If you have compliments or concerns about an experience at your clinic, or if you wish to file a complaint, please contact [facility] Patient Relations at [phone] or email us at [email or web address]         Additional Information About Your Visit        Medic [employer] Information     [employer] gives you secure access to your electronic health record. If you see a primary care provider, you can also send messages to your care team and make appointments. If you have questions, please call your primary care clinic.  If you do not have a primary care provider, please call [phone] and they will assist you.      [employer] is an electronic gateway that provides easy, online access to your medical records. With [employer], you can request a clinic appointment, read your test results, renew a prescription or communicate with your care team.     To access your existing account, please contact your [facility] or call [phone] for assistance.        Care EveryWhere ID     This is your Care EveryWhere ID. This could be used by other organizations to access your [place] medical records  [id number]         Blood Pressure from Last 3 Encounters:   04/21/17 154/78   04/10/17 130/71   03/22/17 136/73    Weight from Last 3 Encounters:   04/21/17 102.5 kg (226 lb)   04/10/17 107.5 kg (236 lb 14.4 oz)   03/22/17 108.8 kg (239 lb 14.4 oz)              Today, you had the following     No orders found for display         Today's Medication Changes          These changes are accurate as of: 4/21/17  2:32 PM.  If you have  any questions, ask your nurse or doctor.               Start taking these medicines.        Dose/Directions    ciprofloxacin 250 MG tablet   Commonly known as:  CIPRO   Used for:  Malignant neoplasm of left kidney (H)   Started by:  Joe Mitchell MD        Dose:  250 mg   Take 1 tablet (250 mg) by mouth 2 times daily for 7 days   Quantity:  14 tablet   Refills:  0            Where to get your medicines      These medications were sent to [place], [place] - [address] [facility] [address]  [address], [facility] [address]    Hours:  TRANSPLANT PHONE NUMBER [phone] Phone:  [phone]     ciprofloxacin 250 MG tablet                Primary Care Provider Office Phone # Fax #    Tino CORNELIA PabloPate [phone]       [facility] [address] [facility] [address]        Thank you!     Thank you for choosing [facility]OLOG[facility] AND [facility] PROST[facility] UROLOGIC [facility]CERS  for your care. Our goal is always to provide you with excellent care. Hearing back from our patients is one way we can continue to improve our services. Please take a few minutes to complete the written survey that you may receive in the mail after your visit with us. Thank you!             Your Updated Medication List - Protect others around you: Learn how to safely use, store and throw away your medicines at www.disposemymeds.org.          This list is accurate as of: 4/21/17  2:32 PM.  Always use your most recent med list.                   Brand Name Dispense Instructions for use    ACETAMINOPHEN PO      Take 500-1,000 mg by mouth every 8 hours as needed for pain       aspirin 325 MG tablet      March 22, 2017 - Patient has been holding this medication in preparation for surgery.  Not taken since December 2016.  Plans to resume after surgery in April, will continue to hold for now.  Previously was on 325 mg by mouth daily.       atenolol 50 MG tablet    TENORMIN     Take 50  mg by mouth every morning       chlorthalidone 25 MG tablet    HYGROTON     Take 25 mg by mouth every morning       ciprofloxacin 250 MG tablet    CIPRO    14 tablet    Take 1 tablet (250 mg) by mouth 2 times daily for 7 days       famotidine 40 MG tablet    PEPCID     Take 40 mg by mouth 2 times daily       FISH OIL PO      March 22, 2017 - Patient has been holding this medication in preparation for surgery.  Not taken since December 2016.  Plans to resume after surgery in April, will continue to hold for now.  Previously was on fish oil 1000 mg by mouth twice daily.       HYDROcodone-acetaminophen 5-325 MG per tablet    NORCO    30 tablet    Take 1-2 tablets by mouth every 4 hours as needed for moderate to severe pain       insulin aspart 100 UNIT/ML injection    NovoLOG PEN     Inject Subcutaneous 4 times daily (with meals and nightly) Takes base of 12 units +SSI at breakfast  Takes base of 12 units +SSI at lunch Takes base of 14 units +SSI at supper Takes base of 12 units +SSI at bedtime       insulin glargine 100 UNIT/ML injection    LANTUS     Inject 45 Units Subcutaneous At Bedtime       isosorbide mononitrate 60 MG 24 hr tablet    IMDUR     Take 60 mg by mouth every morning       losartan 25 MG tablet    COZAAR     Take 25 mg by mouth every morning       metFORMIN 500 MG tablet    GLUCOPHAGE     Take 1,000 mg by mouth 2 times daily (with meals)       MULTIVITAMIN ADULT Tabs      Take 1 tablet by mouth every evening       NITROGLYCERIN SL      Place 0.4 mg under the tongue every 5 minutes as needed for chest pain       order for DME     1 each    Equipment being ordered: Walker () Treatment Diagnosis: Limited mobility       oxymetazoline 0.05 % spray    AFRIN     Spray 2 sprays into both nostrils 2 times daily as needed for congestion       potassium gluconate 2.5 MEQ Tabs tablet      Take 2.5 mEq by mouth every morning       senna-docusate 8.6-50 MG per tablet    SENOKOT-S;PERICOLACE    100 tablet     Take 1-2 tablets by mouth 2 times daily       simvastatin 40 MG tablet    ZOCOR     Take 40 mg by mouth At Bedtime

## 2017-04-23 LAB
BACTERIA SPEC CULT: ABNORMAL
Lab: ABNORMAL
MICRO REPORT STATUS: ABNORMAL
MICROORGANISM SPEC CULT: ABNORMAL
SPECIMEN SOURCE: ABNORMAL

## 2017-04-27 ENCOUNTER — HOSPITAL ENCOUNTER (OUTPATIENT)
Facility: CLINIC | Age: 67
Discharge: HOME | End: 2017-04-27
Attending: UROLOGY | Admitting: UROLOGY
Payer: MEDICARE

## 2017-04-27 ENCOUNTER — ANESTHESIA (OUTPATIENT)
Dept: SURGERY | Facility: AMBULATORY SURGERY CENTER | Age: 67
End: 2017-04-27

## 2017-04-27 ENCOUNTER — ANESTHESIA EVENT (OUTPATIENT)
Dept: SURGERY | Facility: CLINIC | Age: 67
End: 2017-04-27
Payer: MEDICARE

## 2017-04-27 ENCOUNTER — ANESTHESIA (OUTPATIENT)
Dept: SURGERY | Facility: CLINIC | Age: 67
End: 2017-04-27
Payer: MEDICARE

## 2017-04-27 ENCOUNTER — SURGERY (OUTPATIENT)
Age: 67
End: 2017-04-27

## 2017-04-27 ENCOUNTER — APPOINTMENT (OUTPATIENT)
Dept: GENERAL RADIOLOGY | Facility: CLINIC | Age: 67
End: 2017-04-27
Attending: UROLOGY
Payer: MEDICARE

## 2017-04-27 VITALS
RESPIRATION RATE: 16 BRPM | TEMPERATURE: 98 F | SYSTOLIC BLOOD PRESSURE: 138 MMHG | BODY MASS INDEX: 32.88 KG/M2 | WEIGHT: 222 LBS | HEIGHT: 69 IN | OXYGEN SATURATION: 98 % | DIASTOLIC BLOOD PRESSURE: 78 MMHG

## 2017-04-27 LAB
CREAT SERPL-MCNC: 1.49 MG/DL (ref 0.66–1.25)
GFR SERPL CREATININE-BSD FRML MDRD: 47 ML/MIN/1.7M2
GLUCOSE BLDC GLUCOMTR-MCNC: 109 MG/DL (ref 70–99)
GLUCOSE BLDC GLUCOMTR-MCNC: 128 MG/DL (ref 70–99)
POTASSIUM SERPL-SCNC: 3.6 MMOL/L (ref 3.4–5.3)

## 2017-04-27 PROCEDURE — C2617 STENT, NON-COR, TEM W/O DEL: HCPCS | Performed by: UROLOGY

## 2017-04-27 PROCEDURE — 71000027 ZZH RECOVERY PHASE 2 EACH 15 MINS: Performed by: UROLOGY

## 2017-04-27 PROCEDURE — 27210794 ZZH OR GENERAL SUPPLY STERILE: Performed by: UROLOGY

## 2017-04-27 PROCEDURE — 25000125 ZZHC RX 250: Performed by: NURSE ANESTHETIST, CERTIFIED REGISTERED

## 2017-04-27 PROCEDURE — 25000128 H RX IP 250 OP 636: Performed by: UROLOGY

## 2017-04-27 PROCEDURE — C1769 GUIDE WIRE: HCPCS | Performed by: UROLOGY

## 2017-04-27 PROCEDURE — 40000170 ZZH STATISTIC PRE-PROCEDURE ASSESSMENT II: Performed by: UROLOGY

## 2017-04-27 PROCEDURE — 82565 ASSAY OF CREATININE: CPT | Performed by: UROLOGY

## 2017-04-27 PROCEDURE — 25800025 ZZH RX 258: Performed by: ANESTHESIOLOGY

## 2017-04-27 PROCEDURE — 37000009 ZZH ANESTHESIA TECHNICAL FEE, EACH ADDTL 15 MIN: Performed by: UROLOGY

## 2017-04-27 PROCEDURE — 82962 GLUCOSE BLOOD TEST: CPT

## 2017-04-27 PROCEDURE — 84132 ASSAY OF SERUM POTASSIUM: CPT | Performed by: UROLOGY

## 2017-04-27 PROCEDURE — 25500064 ZZH RX 255 OP 636: Performed by: UROLOGY

## 2017-04-27 PROCEDURE — 36000053 ZZH SURGERY LEVEL 2 EA 15 ADDTL MIN - UMMC: Performed by: UROLOGY

## 2017-04-27 PROCEDURE — 25000128 H RX IP 250 OP 636: Performed by: NURSE ANESTHETIST, CERTIFIED REGISTERED

## 2017-04-27 PROCEDURE — 36000055 ZZH SURGERY LEVEL 2 W FLUORO 1ST 30 MIN - UMMC: Performed by: UROLOGY

## 2017-04-27 PROCEDURE — 36415 COLL VENOUS BLD VENIPUNCTURE: CPT | Performed by: UROLOGY

## 2017-04-27 PROCEDURE — 37000008 ZZH ANESTHESIA TECHNICAL FEE, 1ST 30 MIN: Performed by: UROLOGY

## 2017-04-27 PROCEDURE — 40000278 XR SURGERY CARM FLUORO LESS THAN 5 MIN: Mod: TC

## 2017-04-27 DEVICE — STENT URETERAL FIRM 6FRX26CM W/O GW G23406: Type: IMPLANTABLE DEVICE | Status: FUNCTIONAL

## 2017-04-27 RX ORDER — FENTANYL CITRATE 50 UG/ML
25-50 INJECTION, SOLUTION INTRAMUSCULAR; INTRAVENOUS
Status: DISCONTINUED | OUTPATIENT
Start: 2017-04-27 | End: 2017-04-27 | Stop reason: HOSPADM

## 2017-04-27 RX ORDER — PROPOFOL 10 MG/ML
INJECTION, EMULSION INTRAVENOUS CONTINUOUS PRN
Status: DISCONTINUED | OUTPATIENT
Start: 2017-04-27 | End: 2017-04-27

## 2017-04-27 RX ORDER — ONDANSETRON 2 MG/ML
INJECTION INTRAMUSCULAR; INTRAVENOUS PRN
Status: DISCONTINUED | OUTPATIENT
Start: 2017-04-27 | End: 2017-04-27

## 2017-04-27 RX ORDER — LIDOCAINE 40 MG/G
CREAM TOPICAL
Status: DISCONTINUED | OUTPATIENT
Start: 2017-04-27 | End: 2017-04-27 | Stop reason: HOSPADM

## 2017-04-27 RX ORDER — SODIUM CHLORIDE, SODIUM LACTATE, POTASSIUM CHLORIDE, CALCIUM CHLORIDE 600; 310; 30; 20 MG/100ML; MG/100ML; MG/100ML; MG/100ML
INJECTION, SOLUTION INTRAVENOUS CONTINUOUS
Status: DISCONTINUED | OUTPATIENT
Start: 2017-04-27 | End: 2017-04-27 | Stop reason: HOSPADM

## 2017-04-27 RX ORDER — CEFAZOLIN SODIUM 2 G/100ML
2 INJECTION, SOLUTION INTRAVENOUS
Status: DISCONTINUED | OUTPATIENT
Start: 2017-04-27 | End: 2017-04-27 | Stop reason: HOSPADM

## 2017-04-27 RX ORDER — HYDROMORPHONE HYDROCHLORIDE 1 MG/ML
.3-.5 INJECTION, SOLUTION INTRAMUSCULAR; INTRAVENOUS; SUBCUTANEOUS EVERY 10 MIN PRN
Status: DISCONTINUED | OUTPATIENT
Start: 2017-04-27 | End: 2017-04-27 | Stop reason: HOSPADM

## 2017-04-27 RX ORDER — NALOXONE HYDROCHLORIDE 0.4 MG/ML
.1-.4 INJECTION, SOLUTION INTRAMUSCULAR; INTRAVENOUS; SUBCUTANEOUS
Status: DISCONTINUED | OUTPATIENT
Start: 2017-04-27 | End: 2017-04-27 | Stop reason: HOSPADM

## 2017-04-27 RX ORDER — MEPERIDINE HYDROCHLORIDE 25 MG/ML
12.5 INJECTION INTRAMUSCULAR; INTRAVENOUS; SUBCUTANEOUS
Status: DISCONTINUED | OUTPATIENT
Start: 2017-04-27 | End: 2017-04-27 | Stop reason: HOSPADM

## 2017-04-27 RX ORDER — CEFAZOLIN SODIUM 1 G/3ML
1 INJECTION, POWDER, FOR SOLUTION INTRAMUSCULAR; INTRAVENOUS SEE ADMIN INSTRUCTIONS
Status: DISCONTINUED | OUTPATIENT
Start: 2017-04-27 | End: 2017-04-27 | Stop reason: HOSPADM

## 2017-04-27 RX ORDER — PROPOFOL 10 MG/ML
INJECTION, EMULSION INTRAVENOUS PRN
Status: DISCONTINUED | OUTPATIENT
Start: 2017-04-27 | End: 2017-04-27

## 2017-04-27 RX ORDER — ONDANSETRON 2 MG/ML
4 INJECTION INTRAMUSCULAR; INTRAVENOUS EVERY 30 MIN PRN
Status: DISCONTINUED | OUTPATIENT
Start: 2017-04-27 | End: 2017-04-27 | Stop reason: HOSPADM

## 2017-04-27 RX ORDER — ONDANSETRON 4 MG/1
4 TABLET, ORALLY DISINTEGRATING ORAL EVERY 30 MIN PRN
Status: DISCONTINUED | OUTPATIENT
Start: 2017-04-27 | End: 2017-04-27 | Stop reason: HOSPADM

## 2017-04-27 RX ORDER — LIDOCAINE HYDROCHLORIDE 20 MG/ML
INJECTION, SOLUTION INFILTRATION; PERINEURAL PRN
Status: DISCONTINUED | OUTPATIENT
Start: 2017-04-27 | End: 2017-04-27

## 2017-04-27 RX ADMIN — CEFAZOLIN 2 G: 1 INJECTION, POWDER, FOR SOLUTION INTRAMUSCULAR; INTRAVENOUS at 10:19

## 2017-04-27 RX ADMIN — ONDANSETRON 4 MG: 2 INJECTION INTRAMUSCULAR; INTRAVENOUS at 10:17

## 2017-04-27 RX ADMIN — SODIUM CHLORIDE, POTASSIUM CHLORIDE, SODIUM LACTATE AND CALCIUM CHLORIDE: 600; 310; 30; 20 INJECTION, SOLUTION INTRAVENOUS at 10:06

## 2017-04-27 RX ADMIN — PROPOFOL 75 MCG/KG/MIN: 10 INJECTION, EMULSION INTRAVENOUS at 10:17

## 2017-04-27 RX ADMIN — PROPOFOL 20 MG: 10 INJECTION, EMULSION INTRAVENOUS at 10:27

## 2017-04-27 RX ADMIN — IOTHALAMATE MEGLUMINE 15 ML: 600 INJECTION INTRAVASCULAR at 10:43

## 2017-04-27 RX ADMIN — PROPOFOL 40 MG: 10 INJECTION, EMULSION INTRAVENOUS at 10:17

## 2017-04-27 RX ADMIN — LIDOCAINE HYDROCHLORIDE 100 MG: 20 INJECTION, SOLUTION INFILTRATION; PERINEURAL at 10:17

## 2017-04-27 ASSESSMENT — LIFESTYLE VARIABLES: TOBACCO_USE: 1

## 2017-04-27 NOTE — DISCHARGE INSTRUCTIONS
[facility]  Same-Day Surgery   Adult Discharge Orders & Instructions     For 24 hours after surgery    1. Get plenty of rest.  A responsible adult must stay with you for at least 24 hours after you leave the hospital.   2. Do not drive or use heavy equipment.  If you have weakness or tingling, don't drive or use heavy equipment until this feeling goes away.  3. Do not drink alcohol.  4. Avoid strenuous or risky activities.  Ask for help when climbing stairs.   5. You may feel lightheaded.  IF so, sit for a few minutes before standing.  Have someone help you get up.   6. If you have nausea (feel sick to your stomach): Drink only clear liquids such as apple juice, ginger ale, broth or 7-Up.  Rest may also help.  Be sure to drink enough fluids.  Move to a regular diet as you feel able.  7. You may have a slight fever. Call the doctor if your fever is over 100 F (37.7 C) (taken under the tongue) or lasts longer than 24 hours.  8. You may have a dry mouth, a sore throat, muscle aches or trouble sleeping.  These should go away after 24 hours.  9. Do not make important or legal decisions.   Call your doctor for any of the followin.  Signs of infection (fever, growing tenderness at the surgery site, a large amount of drainage or bleeding, severe pain, foul-smelling drainage, redness, swelling).    2. It has been over 8 to 10 hours since surgery and you are still not able to urinate (pass water).    3.  Headache for over 24 hours.    To contact a doctor, call Dr Joe Mitchell's office at [phone]--Urology  or:        [phone] and ask for the resident on call for Urology (answered 24 hours a day)      Emergency Department:    [facility]: [phone]       (TTY for hearing impaired: [phone])

## 2017-04-27 NOTE — IP AVS SNAPSHOT
MRN:[id number]                      After Visit Summary   4/27/2017    Larry Wegener    MRN: [id number]           Thank you!     Thank you for choosing [facility] for your care. Our goal is always to provide you with excellent care. Hearing back from our patients is one way we can continue to improve our services. Please take a few minutes to complete the written survey that you may receive in the mail after you visit with us. Thank you!        Patient Information     Date Of Birth          1950        About your hospital stay     You were admitted on:  April 27, 2017 You last received care in the:  Same Day Surgery [facility]    You were discharged on:  April 27, 2017       Who to Call     For medical emergencies, please call 911.  For non-urgent questions about your medical care, please call your primary care provider or clinic, [phone]  For questions related to your surgery, please call your surgery clinic        Attending Provider     Provider Joe Woodard MD Urology       Primary Care Provider Office Phone # Fax #    Tino LOCKE Pate [phone]       [facility] [address] [facility] [address]        After Care Instructions     Discharge Instructions       FOLLOW-UP  Call the urology clinic on May 8th to report drain outputs.  Please ask for Irma or Estefania.    Follow-up in clinic and needed if you have problems.  Call or return sooner than your regularly scheduled visit if you develop any of the following:    Fever  Uncontrolled pain  Uncontrolled nausea or vomiting  Shortness of breath  Chest pain  Any other symptoms that are worrisome to you    ACTIVITY  No strenuous exercise for 2 days.  Do not drive until you are off of narcotic pain medication and can press the brake pedal quickly and fully without pain.   Do not operate a motor vehicle while taking narcotic pain medications.     PAIN MEDICATIONS  Take pain medication as  needed for pain.    Do not take any additional Tylenol (acetaminophen) while using Percocet or Vicodin  Do not take more than 3,000mg of Tylenol (acetaminophen) in any 24 hour period, as this can cause liver damage.  Wean yourself off of narcotic pain medications as tolerated    PREVENTION OF CONSTIPATION  Narcotic pain medication can cause constipation.  To prevent this, use the following measures.  Start with the treatments at the top and progress down the list until you have relief.  Prevention is important as this problem is easier to prevent than treat.    Stool softener such as Senna or Colace.  This will typically be prescribed for your  Fiber products such as Metamucil.  This is available over the counter without a prescription.  Miralax.  This is available over the counter without a prescription.  Do not use this product if you have significant renal failure.  Enema.  This is available over the counter without a prescription.  Some enemas may not be used if you have significant renal failure.        QUESTIONS  You may call the Urology Clinic during daytime hours at [phone].  If after hours, call [phone] and ask to speak with the Urology resident on call.                  Further instructions from your care team       [facility]  Same-Day Surgery   Adult Discharge Orders & Instructions     For 24 hours after surgery    1. Get plenty of rest.  A responsible adult must stay with you for at least 24 hours after you leave the hospital.   2. Do not drive or use heavy equipment.  If you have weakness or tingling, don't drive or use heavy equipment until this feeling goes away.  3. Do not drink alcohol.  4. Avoid strenuous or risky activities.  Ask for help when climbing stairs.   5. You may feel lightheaded.  IF so, sit for a few minutes before standing.  Have someone help you get up.   6. If you have nausea (feel sick to your stomach): Drink only clear liquids such  "as apple juice, ginger ale, broth or 7-Up.  Rest may also help.  Be sure to drink enough fluids.  Move to a regular diet as you feel able.  7. You may have a slight fever. Call the doctor if your fever is over 100 F (37.7 C) (taken under the tongue) or lasts longer than 24 hours.  8. You may have a dry mouth, a sore throat, muscle aches or trouble sleeping.  These should go away after 24 hours.  9. Do not make important or legal decisions.   Call your doctor for any of the followin.  Signs of infection (fever, growing tenderness at the surgery site, a large amount of drainage or bleeding, severe pain, foul-smelling drainage, redness, swelling).    2. It has been over 8 to 10 hours since surgery and you are still not able to urinate (pass water).    3.  Headache for over 24 hours.    To contact a doctor, call Dr Joe Mitchell's office at [phone]--Urology  or:        [phone] and ask for the resident on call for Urology (answered 24 hours a day)      Emergency Department:    [facility]: [phone]       (TTY for hearing impaired: [phone])          Pending Results     No orders found from 2017 to 2017.            Admission Information     Date & Time Provider Department Dept. Phone    2017 Joe Mitchell MD Same Day Surgery [facility] [phone]      Your Vitals Were     Blood Pressure Temperature Respirations Height Weight Pulse Oximetry    150/82 98.2  F (36.8  C) (Oral) 15 1.753 m (5' 9\") 100.7 kg (222 lb 0.1 oz) 98%    BMI (Body Mass Index)                   32.78 kg/m2           [employer] Information     [employer] gives you secure access to your electronic health record. If you see a primary care provider, you can also send messages to your care team and make appointments. If you have questions, please call your primary care clinic.  If you do not have a primary care provider, please call [phone] and they will assist you.        Care EveryWhere ID  "    This is your Care EveryWhere ID. This could be used by other organizations to access your [place] medical records  [id number]           Review of your medicines      CONTINUE these medicines which may have CHANGED, or have new prescriptions. If we are uncertain of the size of tablets/capsules you have at home, strength may be listed as something that might have changed.        Dose / Directions    senna-docusate 8.6-50 MG per tablet   Commonly known as:  SENOKOT-S;PERICOLACE   This may have changed:    - how much to take  - when to take this   Used for:  Renal cancer, unspecified laterality (H)        Dose:  1-2 tablet   Take 1-2 tablets by mouth 2 times daily   Quantity:  100 tablet   Refills:  0         CONTINUE these medicines which have NOT CHANGED        Dose / Directions    ACETAMINOPHEN PO        Dose:  500-1000 mg   Take 500-1,000 mg by mouth every 8 hours as needed for pain   Refills:  0       aspirin 325 MG tablet        March 22, 2017 - Patient has been holding this medication in preparation for surgery.  Not taken since December 2016.  Plans to resume after surgery in April, will continue to hold for now.  Previously was on 325 mg by mouth daily.   Refills:  0       atenolol 50 MG tablet   Commonly known as:  TENORMIN        Dose:  50 mg   Take 50 mg by mouth every morning   Refills:  0       chlorthalidone 25 MG tablet   Commonly known as:  HYGROTON        Dose:  25 mg   Take 25 mg by mouth every morning   Refills:  0       ciprofloxacin 250 MG tablet   Commonly known as:  CIPRO   Used for:  Malignant neoplasm of left kidney (H)        Dose:  250 mg   Take 1 tablet (250 mg) by mouth 2 times daily for 7 days   Quantity:  14 tablet   Refills:  0       famotidine 40 MG tablet   Commonly known as:  PEPCID        Dose:  40 mg   Take 40 mg by mouth 2 times daily   Refills:  0       HYDROcodone-acetaminophen 5-325 MG per tablet   Commonly known as:  NORCO   Used for:  Renal cancer, unspecified laterality  (H)        Dose:  1-2 tablet   Take 1-2 tablets by mouth every 4 hours as needed for moderate to severe pain   Quantity:  30 tablet   Refills:  0       insulin aspart 100 UNIT/ML injection   Commonly known as:  NovoLOG PEN        Inject Subcutaneous 4 times daily (with meals and nightly) Takes base of 12 units +SSI at breakfast  Takes base of 12 units +SSI at lunch Takes base of 14 units +SSI at supper Takes base of 12 units +SSI at bedtime   Refills:  0       insulin glargine 100 UNIT/ML injection   Commonly known as:  LANTUS        Dose:  45 Units   Inject 45 Units Subcutaneous At Bedtime   Refills:  0       isosorbide mononitrate 60 MG 24 hr tablet   Commonly known as:  IMDUR        Dose:  60 mg   Take 60 mg by mouth every morning   Refills:  0       losartan 25 MG tablet   Commonly known as:  COZAAR        Dose:  25 mg   Take 25 mg by mouth every morning   Refills:  0       metFORMIN 500 MG tablet   Commonly known as:  GLUCOPHAGE        Dose:  1000 mg   Take 1,000 mg by mouth 2 times daily (with meals)   Refills:  0       MULTIVITAMIN ADULT Tabs        Dose:  1 tablet   Take 1 tablet by mouth every evening   Refills:  0       NITROGLYCERIN SL        Dose:  0.4 mg   Place 0.4 mg under the tongue every 5 minutes as needed for chest pain   Refills:  0       order for DME   Used for:  Renal cancer, unspecified laterality (H)        Equipment being ordered: Walker () Treatment Diagnosis: Limited mobility   Quantity:  1 each   Refills:  0       potassium gluconate 2.5 MEQ Tabs tablet        Dose:  2.5 mEq   Take 2.5 mEq by mouth every morning   Refills:  0       simvastatin 40 MG tablet   Commonly known as:  ZOCOR        Dose:  40 mg   Take 40 mg by mouth At Bedtime   Refills:  0                Protect others around you: Learn how to safely use, store and throw away your medicines at www.disposemymeds.org.             Medication List: This is a list of all your medications and when to take them. Check marks below  indicate your daily home schedule. Keep this list as a reference.      Medications           Morning Afternoon Evening Bedtime As Needed    ACETAMINOPHEN PO   Take 500-1,000 mg by mouth every 8 hours as needed for pain                                aspirin 325 MG tablet   March 22, 2017 - Patient has been holding this medication in preparation for surgery.  Not taken since December 2016.  Plans to resume after surgery in April, will continue to hold for now.  Previously was on 325 mg by mouth daily.                                atenolol 50 MG tablet   Commonly known as:  TENORMIN   Take 50 mg by mouth every morning                                chlorthalidone 25 MG tablet   Commonly known as:  HYGROTON   Take 25 mg by mouth every morning                                ciprofloxacin 250 MG tablet   Commonly known as:  CIPRO   Take 1 tablet (250 mg) by mouth 2 times daily for 7 days                                famotidine 40 MG tablet   Commonly known as:  PEPCID   Take 40 mg by mouth 2 times daily                                HYDROcodone-acetaminophen 5-325 MG per tablet   Commonly known as:  NORCO   Take 1-2 tablets by mouth every 4 hours as needed for moderate to severe pain                                insulin aspart 100 UNIT/ML injection   Commonly known as:  NovoLOG PEN   Inject Subcutaneous 4 times daily (with meals and nightly) Takes base of 12 units +SSI at breakfast  Takes base of 12 units +SSI at lunch Takes base of 14 units +SSI at supper Takes base of 12 units +SSI at bedtime                                insulin glargine 100 UNIT/ML injection   Commonly known as:  LANTUS   Inject 45 Units Subcutaneous At Bedtime                                isosorbide mononitrate 60 MG 24 hr tablet   Commonly known as:  IMDUR   Take 60 mg by mouth every morning                                losartan 25 MG tablet   Commonly known as:  COZAAR   Take 25 mg by mouth every morning                                 metFORMIN 500 MG tablet   Commonly known as:  GLUCOPHAGE   Take 1,000 mg by mouth 2 times daily (with meals)                                MULTIVITAMIN ADULT Tabs   Take 1 tablet by mouth every evening                                NITROGLYCERIN SL   Place 0.4 mg under the tongue every 5 minutes as needed for chest pain                                order for DME   Equipment being ordered: Walker () Treatment Diagnosis: Limited mobility                                potassium gluconate 2.5 MEQ Tabs tablet   Take 2.5 mEq by mouth every morning                                senna-docusate 8.6-50 MG per tablet   Commonly known as:  SENOKOT-S;PERICOLACE   Take 1-2 tablets by mouth 2 times daily                                simvastatin 40 MG tablet   Commonly known as:  ZOCOR   Take 40 mg by mouth At Bedtime

## 2017-04-27 NOTE — OP NOTE
PROCEDURES PERFORMED:     Cystourethroscopy    Left  retrograde pyelography    Placement of  ureteral stent    Intraoperative interpretation of fluoroscopic imaging    PREOPERATIVE DIAGNOSIS:  Urine leak post open partial nephrectomy.   POSTOPERATIVE DIAGNOSIS:  Same  STAFF SURGEON: JESSICA Mitchell MD  RESIDENT: None  ANESTHESIA: mac  ESTIMATED BLOOD LOSS: 0 mL.   COMPLICATIONS: None  DRAINS: [id number] double J stent.  SPECIMENS:    None  FINDINGS:     Retrograde pyelogram showed extravasation from left upper pole of kidney.    The ureter was mildly tortuous but open    OPERATIVE INDICATIONS:   Larry Wegener is a 67 year old male with persistent drainage status post open partial nephrectomy.  He is here for cystoscopy and stent placement in an attempt to stop this drainage from the kidney.    DESCRIPTION OF PROCEDURE:    After informed consent was obtained, the patient was taken to the operating room, and moved to the operating table.  After adequate anesthesia was induced, the patient was repositioned in dorsal lithotomy position and prepped and draped in the usual sterile fashion. A timeout was taken to confirm correct patient, procedure and laterality.     A 22-Zimbabwean rigid cystoscope was inserted into a well lubricated urethra. The urethra was unremarkable.  The prostate was 3 cm and with moderate lateral lobe hypertrophy.  The bladder was surveyed and found to be free of tumors, stones or diverticuli.  There were sites of recent bladder tumor resection done previously by Dr Mclaughlin.  The media was clear.  The bilateral ureteral orifices were orthotopic.      A 5-Zimbabwean open ended ureteral catheter was advanced into the right ureter and a retrograde pyelogram was taken.  Please see the FINDINGS section for details of retrograde pyelogram results.      Using the open ended catheter, a guidewire was replaced and a double-J stent placed to the kidney using fluoroscopic guidance. Please see DRAINS section for stent  details. A good curl was noted in the renal pelvis and bladder after guidewire removal.  The stent placement was straightforward.       The bladder was then drained.    The patient tolerated the procedure well.  There were no complications.      PLAN: He will call in 1 week with drain outputs.  If these decrease to a low level then I will removed tubes in the following order: oliva, then stent, then RONNY drain.

## 2017-04-27 NOTE — IP AVS SNAPSHOT
[facility] [address] [address]    Phone:  [phone]                                       After Visit Summary   4/27/2017    Larry Wegener    MRN: [id number]           After Visit Summary Signature Page     I have received my discharge instructions, and my questions have been answered. I have discussed any challenges I see with this plan with the nurse or doctor.    ..........................................................................................................................................  Patient/Patient Representative Signature      ..........................................................................................................................................  Patient Representative Print Name and Relationship to Patient    ..................................................               ................................................  Date                                            Time    ..........................................................................................................................................  Reviewed by Signature/Title    ...................................................              ..............................................  Date                                                            Time

## 2017-04-27 NOTE — ANESTHESIA CARE TRANSFER NOTE
Patient: Larry Wegener    Procedure(s):  Cystoscopy with Left Stent and Carvajal Catheter Placement  - Wound Class: II-Clean Contaminated    Diagnosis: Ureter Stricture   Diagnosis Additional Information: No value filed.    Anesthesia Type:   General, ETT, LMA     Note:  Airway :Room Air  Patient transferred to:Phase II  Comments: Pt. Pink and breathing spontaneously.  Vitals stable.  Report given to oncoming nurse.  Transfer of care occurred.      Vitals: (Last set prior to Anesthesia Care Transfer)    CRNA VITALS  4/27/2017 1025 - 4/27/2017 1113      4/27/2017             Pulse: 76    SpO2: 99 %    Resp Rate (set): 10                Electronically Signed By: RAFAEL Larry CRNA  April 27, 2017  11:13 AM

## 2017-04-27 NOTE — ANESTHESIA PREPROCEDURE EVALUATION
Anesthesia Evaluation     . Pt has had prior anesthetic. Type: General and MAC    No history of anesthetic complications          ROS/MED HX    ENT/Pulmonary:     (+)VICTOR M risk factors snores loudly, hypertension, obese, tobacco use, Past use 1.5 packs/day  , . .    Neurologic:     (+)neuropathy - bilateral feet,     Cardiovascular: Comment: History of acute CHF     (+) Dyslipidemia, hypertension-range: 120/70's, -CAD, -past MI,CABG-date: 2004, stent,1997  3 . Taking blood thinners Pt has received instructions: Instructions Given to patient: stopped aspirin 4/21/2017. . . :. . Previous cardiac testing Echodate:7/10/2015results:CONCLUSION:  1. Normal LV size. Ejection fraction is approximately 45-50%. Inferolateral and inferior hypokinesis. Diastolic function is stage I.    2. No AS. No aortic insufficiency.     3. Mild MR.    4. Mild pulmonic insufficiency.     5. Mild TR.     6. No pericardial effusion.     7. Left atrial enlargement.     8. No previous echocardiogram for comparison. Stress Testdate:4/4/2017 results:Interpretation Summary  Technically difficult study limited by baseline wall motion abnormalities and  difficult acoustic windows.  High risk exercise stress echocardiogram.  Inferior akinesis at rest and peak exercise with associated mid-anterolateral  hypokinesis at peak exercise.  Mild to moderately reduced LV systolic function at rest. The LVEF is 40-45%  with minimal increase to 45-50% with exercise.  Normal blood pressure response to exercise.  EKG demonstrates baseline RBBB and Q waves inferior and inferolaterally. No  EKG evidence of ischemia at peak exercise.  No subjective evidence of ischemia at rest and at peak exercise.  No significant valvular disease noted on routine screening color flow Doppler  and pulsed Doppler examination.  In aggregate, these findings are c/w extensive prior myocardial infarction in  the inferior and lateral myocardial wall with perinfarct inducible ischemia in  the  lateral myocardial segment.ECG reviewed date:4/6/2017 results:Sinus rhythm, 1st degree AVB, RBBB, inferior infarct date: results:          METS/Exercise Tolerance:  1 - Eating, dressing   Hematologic:     (+) Anemia, History of Transfusion no previous transfusion reaction -     (-) history of blood clots   Musculoskeletal:  - neg musculoskeletal ROS       GI/Hepatic:     (+) GERD Asymptomatic on medication,       Renal/Genitourinary:     (+) chronic renal disease, type: CRI, Pt does not require dialysis, Pt has no history of transplant, Other Renal/ Genitourinary, left renal cancer s/p partial nephrectomy, bladder cancer, left ureter stricture      Endo:     (+) type II DM Last HgA1c: 7.6 date: 4/21/2017 Using insulin - not using insulin pump Normal glucose range: AM fasting- 120's,  non-fasting 250's not previously admitted for DM/DKA Diabetic complications: nephropathy neuropathy retinopathy cardiac problems, Obesity, .      Psychiatric:  - neg psychiatric ROS       Infectious Disease:  - neg infectious disease ROS       Malignancy:   (+) Malignancy History of Other  Other CA left renal, bladder Active status post Surgery         Other:    - neg other ROS                 Physical Exam  Normal systems: cardiovascular, pulmonary and dental    Airway   Mallampati: II  TM distance: >3 FB  Neck ROM: full    Dental     Cardiovascular       Pulmonary                     Anesthesia Plan      History & Physical Review  History and physical reviewed and following examination; no interval change.    ASA Status:  3 .        Plan for General, ETT and LMA with Intravenous induction.   PONV prophylaxis:  Ondansetron (or other 5HT-3) and Dexamethasone or Solumedrol     Procedure:   COMBINED CYSTOSCOPY, INSERT STENT URETER(S) (Left Urethra) Cystoscopy with Left Stent Placement       Anesthesia type: Monitor Anesthesia Care    Pre-op diagnosis: Ureter Stricture     [facility]    Surgeon: Joe Mitchell,  "MD    67 year old for cystoscopy, left stent placement. Chart reviewed, patient seen and evaluated; agree with PAC assessment. Patient has known cardiac disease with prior CABG and stents, recent stress test showed no inducible ischemia. Diabetic, with neuropathy, retinopathy, CKD and prior partial nephrectomy for renal cancer.     Plan: MAc. GA backup  NPO after 20:00. Tylenol at 0600.    Lantus 34 units at 2200 (normal 45)  POC FS =109 at 0912  ( usual morning value)Symptomatic at 80.  Patient forgot to take Tenormin today. Can take postop or we will treat intraop if needed.      Postoperative Care  Postoperative pain management:  IV analgesics.      Consents  Anesthetic plan, risks, benefits and alternatives discussed with:  Patient..        Wegener, Larry [[id number]]  Male - 67 year old - [birth year]  COMBINED CYSTOSCOPY, INSERT STENT URETER(S) (Left Urethra)       Preprocedure Vitals      BP:   150/82   Pulse:   76   Resp:   15   SpO2:   98   Temp:   36.8  C (98.2  F)   Height:   1.753 m (5' 9\")  (04/27/17)   Weight:   100.7 kg (222 lb 0.1 oz)  (04/27/17)   BMI:   32.85   IBW:   70.7 kg (155 lb 15.1 oz)   Last edited 04/27/17 0902 by SL          Allergies      No Known Allergies       Prescription Medications         Last Taken Last Updated     ciprofloxacin (CIPRO) 250 MG tablet    4/26/2017 04/27/17 0926     order for DME          HYDROcodone-acetaminophen (NORCO) 5-325 MG per tablet    Past Month 04/27/17 0926     senna-docusate (SENOKOT-S;PERICOLACE) 8.6-50 MG per tablet    4/26/2017 04/27/17 0926     aspirin 325 MG tablet    Past Week 04/27/17 0926     atenolol (TENORMIN) 50 MG tablet    4/26/2017 04/27/17 0926     chlorthalidone (HYGROTON) 25 MG tablet    4/26/2017 04/27/17 0926     famotidine (PEPCID) 40 MG tablet    4/26/2017 04/27/17 0926     insulin aspart (NOVOLOG PEN) 100 UNIT/ML injection    4/26/2017 04/27/17 0926     insulin glargine (LANTUS) 100 UNIT/ML injection    4/26/2017 04/27/17 0926     " isosorbide mononitrate (IMDUR) 60 MG 24 hr tablet    17 0926     losartan (COZAAR) 25 MG tablet    17 0926     metFORMIN (GLUCOPHAGE) 500 MG tablet    17 0926     potassium gluconate 2.5 MEQ TABS tablet    17 09     simvastatin (ZOCOR) 40 MG tablet    17 0926     Multiple Vitamins-Minerals (MULTIVITAMIN ADULT) TABS    Past Month 17 0926     NITROGLYCERIN SL    More than a month 17 0926     ACETAMINOPHEN PO    17 0926       Hematology Labs        WBC        8.5 17 1716     6.9 17 0736     8.2 17 0729     10.2 17 0518     10.1 17 0655     9.2 17 1430       Electrolyte Labs        K+    Na+        4.0 17 1716 136 17 1716     3.3  04/10/17 0712 137 04/10/17 0712     3.4 17 0736 130  17 0736     4.0 17 0729 128  17 0729     3.9 17 0518 131  17 0518     4.0 17 0655 133 17 0655     3.7 17 1430 136 17 1430     3.9 17 1344 135 17 1344     3.5 17 1103 136 17 1103     3.4 17 0649         Other Labs        No relevant labs found        Substance History      Smoking Status: Former Smoker - 12 pack years     Quit Smokin74     Smokeless Tobacco Status: Never Used     Alcohol use: No     Drug use: No       Facility Administered Medications      ceFAZolin (ANCEF) 1 g vial to attach to  ml bag for ADULT or 50 ml bag for PEDS    ceFAZolin sodium-dextrose (ANCEF) infusion 2 g        lidocaine 1 % 1 mL    lidocaine (LMX4) kit        sodium chloride (PF) 0.9% PF flush 3 mL    sodium chloride (PF) 0.9% PF flush 3 mL        lactated ringers infusion           Anesthesia Family History      Bladder Cancer     Father     Colon Cancer     Father     Coronary Artery Disease     Brother     DIABETES     Mother, Brother     HEART DISEASE     Brother     Heart Failure     Maternal  Grandfather, Paternal Grandmother     Stomach Cancer     Maternal Grandmother, Paternal Grandfather       Problem List      Renal cancer (H)        Medical History        HTN (hypertension) DM II (diabetes mellitus, type II), controlled (H)     CKD (chronic kidney disease) GERD (gastroesophageal reflux disease)     Neuropathy (H) Hyperlipidemia     History of coronary artery bypass graft Renal cancer, left (H)     Bladder cancer (H) Ureteral stricture, left     Old MI (myocardial infarction) Presence of stent in coronary artery     CAD (coronary artery disease) History of smoking     History of blood transfusion Diabetic retinopathy (H)     Obesity        Surgical History      CORONARY ARTERY BYPASS ANGIOGRAM     TONSILLECTOMY CYSTOSCOPY, BIOPSY BLADDER INSTILL OPTICAL AGENT     NEPHRECTOMY PARTIAL OTHER SURGICAL HISTORY     OTHER SURGICAL HISTORY CATARACT IOL, RT/LT     CATARACT IOL, RT/LT             PAC Discussion and Assessment     ASA Classification: 3  Case is suitable for: ASC  Anesthetic techniques and relevant risks discussed: MAC with GA as backup  Invasive monitoring and risk discussed: No  Types:   Possibility and Risk of blood transfusion discussed: No  NPO instructions given:   Additional anesthetic preparation and risks discussed:   Needs early admission to pre-op area:   Other:      PAC Resident/NP Anesthesia Assessment:  Larry Wegener is a67 year old male scheduled for a cystoscopy, left stent placement on 4/27/2017 by Dr. Mitchell in treatment of a left ureteral stricture. PAC referral for risk assessment and optimization for anesthesia with comorbid conditions of: CAD, hypertension, hyperlipidemia, old MI, s/p CABG, s/p coronary stents, history of smoking, anemia, diabetic neuropathy, diabetic retinopathy, diabetes, GERD, CKD, bladder cancer and left renal cancer.     Pre-operative considerations:  1. Cardiac: Functional status- METS 1. Patient hasn't been able to do much physical activity due to  fatigue since his partial nephrectomy on 4/5/2017. He is only walking about a 1/2 block so far. He denies any exertional dyspnea with that or his normal daily activities. He has multiple cardiac co-morbidities, but hasn't followed with a cardiologist in years. He has been encouraged to follow up with one in his home area. A stress echo was completed here prior to his recent surgery; no acute signs of ischemia. Instructed to hold his losartan and chlorthalidone DOS. Low risk surgery with 6.6% risk of major adverse cardiac event. No further cardiac evaluation needed per 2014 ACC/AHA guidelines for non-cardiac surgery.  2. Pulm: Airway feasible. VICTOR M risk: High. Quit smoking in 1974.  3. GI: Risk of PONV score = 1. If > 2, anti-emetic intervention recommended. GERD is well controlled with pepcid.  4. Endo: Diabetes is managed with lantus, novolog, and metformin. Instructed to hold the metformin and novolog the DOS and to take only 80% of his lantus the night before surgery.  5. Heme/onc: Stopped his aspirin 4/20/2017. Following with heme/onc in [place], [place] for bladder cancer and s/p partial nephrectomy for left renal cancer; plans to start BCG treatment there soon. LLQ surgical drain still intact from nephrectomy procedure and continues to have drainage. VTE risk = 4.5% - consider DVT prophylaxis. Anemia noted with Hgb level at 10.4.            Patient is optimized and is acceptable candidate for the proposed procedure. No further diagnostic evaluation is needed.      Patient also evaluated by Dr. Castro. See recommendations below.      For further details of assessment, testing, and physical exam please see H and P completed on same date.              Kanwal Malagon DNP, RN, APRN     Reviewed and Signed by PAC Mid-Level Provider/Resident  Mid-Level Provider/Resident: Kanwal Malagon DNP, RN, APRN  Date: 4/21/2017  Time: 1659     Attending Anesthesiologist Anesthesia Assessment:  67 year old for cystoscopy, left stent  placement. Chart reviewed, patient seen and evaluated; agree with above assessment. Patient has known cardiac disease with prior CABG and stents, recent stress test showed no inducible ischemia. Diabetic, with neuropathy, retinopathy, CKD and prior partial nephrectomy for renal cancer.      Patient is appropriate for the planned procedure without further workup or medical management change. The final anesthesia plan will be determined by the physician anesthesiologist caring for the patient on the day of surgery.     Reviewed and Signed by PAC Anesthesiologist  Anesthesiologist: anderson  Date: 4/21/2017  Time:   Pass/Fail: Pass  Disposition:      PAC Pharmacist Assessment:     Pharmacist:   Date:   Time:                               .

## 2017-04-27 NOTE — ANESTHESIA POSTPROCEDURE EVALUATION
Patient: Larry Wegener    Procedure(s):  Cystoscopy with Left Stent and Carvajal Catheter Placement  - Wound Class: II-Clean Contaminated    Diagnosis:Ureter Stricture   Diagnosis Additional Information: No value filed.    Anesthesia Type:  General, ETT, LMA    Note:  Anesthesia Post Evaluation    Patient location during evaluation: PACU  Patient participation: Able to fully participate in evaluation  Level of consciousness: awake and alert  Pain management: adequate  Airway patency: patent  Cardiovascular status: acceptable  Respiratory status: acceptable  Hydration status: acceptable  PONV: none     Anesthetic complications: None          Last vitals:  Vitals:    04/27/17 0902   BP: 150/82   Resp: 15   Temp: 36.8  C (98.2  F)   SpO2: 98%         Electronically Signed By: Andrew Fan MD  April 27, 2017  11:18 AM

## 2017-05-01 PROBLEM — C64.2 MALIGNANT NEOPLASM OF LEFT KIDNEY (H): Status: ACTIVE | Noted: 2017-05-01

## 2017-05-02 ENCOUNTER — TRANSFERRED RECORDS (OUTPATIENT)
Dept: HEALTH INFORMATION MANAGEMENT | Facility: CLINIC | Age: 67
End: 2017-05-02

## 2020-03-10 ENCOUNTER — HEALTH MAINTENANCE LETTER (OUTPATIENT)
Age: 70
End: 2020-03-10

## 2020-12-27 ENCOUNTER — HEALTH MAINTENANCE LETTER (OUTPATIENT)
Age: 70
End: 2020-12-27

## 2021-04-24 ENCOUNTER — HEALTH MAINTENANCE LETTER (OUTPATIENT)
Age: 71
End: 2021-04-24

## 2021-10-09 ENCOUNTER — HEALTH MAINTENANCE LETTER (OUTPATIENT)
Age: 71
End: 2021-10-09

## 2022-05-16 ENCOUNTER — HEALTH MAINTENANCE LETTER (OUTPATIENT)
Age: 72
End: 2022-05-16

## 2022-09-11 ENCOUNTER — HEALTH MAINTENANCE LETTER (OUTPATIENT)
Age: 72
End: 2022-09-11

## 2023-06-03 ENCOUNTER — HEALTH MAINTENANCE LETTER (OUTPATIENT)
Age: 73
End: 2023-06-03

## (undated) DEVICE — PREP CHLORAPREP 26ML TINTED ORANGE  260815

## (undated) DEVICE — SOL WATER IRRIG 1000ML BOTTLE 2F7114

## (undated) DEVICE — BAG BILE 19OZ LATEX 0015850

## (undated) DEVICE — VESSEL LOOPS YELLOW MAXI 31145694

## (undated) DEVICE — DRAPE IOBAN INCISE 23X17" 6650EZ

## (undated) DEVICE — SOL NACL 0.9% IRRIG 1000ML BOTTLE 2F7124

## (undated) DEVICE — SU VICRYL 2 TP-1 54" UND J880T

## (undated) DEVICE — GUIDEWIRE SENSOR DUAL FLEX STR 0.035"X150CM M0066703080

## (undated) DEVICE — VESSEL LOOPS RED MINI 31145710

## (undated) DEVICE — PACK CYSTO UMMC CUSTOM

## (undated) DEVICE — NDL COUNTER 20CT 31142493

## (undated) DEVICE — DRAPE SPLIT SHEET 77X108 REINFORCED 29436

## (undated) DEVICE — SU VICRYL 0 CT-1 36" J346H

## (undated) DEVICE — SU PDS II 1 CTX 36" Z371T

## (undated) DEVICE — SU SILK 3-0 TIE 12X30" A304H

## (undated) DEVICE — DRAPE U SPLIT 74X120" 29440

## (undated) DEVICE — STRAP UNIVERSAL POSITIONING 2-PIECE 4X47X76" 91-287

## (undated) DEVICE — DRAIN JACKSON PRATT RESERVOIR 100ML SU130-1305

## (undated) DEVICE — TAPE DURAPORE 3" SILK 1538-3

## (undated) DEVICE — WIRE GUIDE 0.035"X145CM BENTSON TSFB-35-145-BH

## (undated) DEVICE — Device

## (undated) DEVICE — PAD CHUX UNDERPAD 23X24" 7136

## (undated) DEVICE — CLIP ENDO HEMO-LOC PURPLE LG 544240

## (undated) DEVICE — GOWN REINFORCED XXLG 9071

## (undated) DEVICE — GLOVE PROTEXIS W/NEU-THERA 8.0  2D73TE80

## (undated) DEVICE — WIPES FOLEY CARE SURESTEP PROVON DFC100

## (undated) DEVICE — SU SILK 2-0 TIE 12X30" A305H

## (undated) DEVICE — LINEN TOWEL PACK X30 5481

## (undated) DEVICE — TAPE MICROPORE 2"X1.5YD 1530S-2

## (undated) DEVICE — SPONGE KITTNER 30-101

## (undated) DEVICE — CLIP HORIZON SM RED WIDE SLOT 001201

## (undated) DEVICE — SU VICRYL 2-0 SH 27" UND J417H

## (undated) DEVICE — DRSG TELFA 3X8" 1238

## (undated) DEVICE — BLADE CLIPPER SGL USE 9680

## (undated) DEVICE — SU SILK 0 TIE 6X30" A306H

## (undated) DEVICE — VESSEL LOOPS BLUE SUPERMAXI 011022PBX

## (undated) DEVICE — GLOVE PROTEXIS W/NEU-THERA 8.5  2D73TE85

## (undated) DEVICE — GOWN XLG DISP 9545

## (undated) DEVICE — SU VICRYL 2-0 TIE 12X18" J905T

## (undated) DEVICE — LINEN TOWEL PACK X6 WHITE 5487

## (undated) DEVICE — SOL NACL 0.9% IRRIG 3000ML BAG 2B7477

## (undated) DEVICE — SUCTION MANIFOLD DORNOCH ULTRA CART UL-CL500

## (undated) DEVICE — CLIP HORIZON MED BLUE 002200

## (undated) DEVICE — ENDO SEAL BX PORT BPS-A

## (undated) DEVICE — SU DERMABOND ADVANCED .7ML DNX12

## (undated) DEVICE — CATH TRAY FOLEY SURESTEP 16FR W/URNE MTR STLK LATEX A303316A

## (undated) DEVICE — LINEN TOWEL PACK X5 5464

## (undated) DEVICE — SU ETHILON 2-0 FS 18" 664H

## (undated) DEVICE — SU PDS II 1 TP-1 54" Z879G

## (undated) DEVICE — SPONGE LAP 18X18" X8435

## (undated) DEVICE — CLIP HORIZON LG ORANGE 004200

## (undated) DEVICE — DRAIN BLAKE 19FR W/TROCAR SIL

## (undated) DEVICE — DRAPE C-ARM W/STRAPS 42X72" 07-CA104

## (undated) DEVICE — PACK AB HYST II

## (undated) DEVICE — SU VICRYL 4-0 PS-2 18" UND J496H

## (undated) DEVICE — CATH URETERAL OPEN END 05FR 70CM 020015

## (undated) DEVICE — SU PROLENE 5-0 RB-1DA 36"  8556H

## (undated) DEVICE — SU PROLENE 4-0 RB-1DA 36" 8557H

## (undated) RX ORDER — DEXAMETHASONE SODIUM PHOSPHATE 4 MG/ML
INJECTION, SOLUTION INTRA-ARTICULAR; INTRALESIONAL; INTRAMUSCULAR; INTRAVENOUS; SOFT TISSUE
Status: DISPENSED
Start: 2017-04-05

## (undated) RX ORDER — CEFAZOLIN SODIUM 2 G/100ML
INJECTION, SOLUTION INTRAVENOUS
Status: DISPENSED
Start: 2017-04-27

## (undated) RX ORDER — ACETAMINOPHEN 325 MG/1
TABLET ORAL
Status: DISPENSED
Start: 2017-04-05

## (undated) RX ORDER — CEFAZOLIN SODIUM 2 G/100ML
INJECTION, SOLUTION INTRAVENOUS
Status: DISPENSED
Start: 2017-04-05

## (undated) RX ORDER — ONDANSETRON 2 MG/ML
INJECTION INTRAMUSCULAR; INTRAVENOUS
Status: DISPENSED
Start: 2017-04-05

## (undated) RX ORDER — FENTANYL CITRATE 50 UG/ML
INJECTION, SOLUTION INTRAMUSCULAR; INTRAVENOUS
Status: DISPENSED
Start: 2017-04-05

## (undated) RX ORDER — HYDROMORPHONE HYDROCHLORIDE 1 MG/ML
INJECTION, SOLUTION INTRAMUSCULAR; INTRAVENOUS; SUBCUTANEOUS
Status: DISPENSED
Start: 2017-04-05

## (undated) RX ORDER — SCOLOPAMINE TRANSDERMAL SYSTEM 1 MG/1
PATCH, EXTENDED RELEASE TRANSDERMAL
Status: DISPENSED
Start: 2017-04-05

## (undated) RX ORDER — HEPARIN SODIUM 5000 [USP'U]/.5ML
INJECTION, SOLUTION INTRAVENOUS; SUBCUTANEOUS
Status: DISPENSED
Start: 2017-04-05

## (undated) RX ORDER — SODIUM CHLORIDE 9 MG/ML
INJECTION, SOLUTION INTRAVENOUS
Status: DISPENSED
Start: 2017-04-05